# Patient Record
Sex: FEMALE | Race: WHITE | NOT HISPANIC OR LATINO | Employment: OTHER
[De-identification: names, ages, dates, MRNs, and addresses within clinical notes are randomized per-mention and may not be internally consistent; named-entity substitution may affect disease eponyms.]

---

## 2017-05-26 ENCOUNTER — HEALTH MAINTENANCE LETTER (OUTPATIENT)
Age: 47
End: 2017-05-26

## 2018-06-27 ENCOUNTER — OFFICE VISIT (OUTPATIENT)
Dept: FAMILY MEDICINE | Facility: CLINIC | Age: 48
End: 2018-06-27
Payer: COMMERCIAL

## 2018-06-27 VITALS
HEIGHT: 63 IN | WEIGHT: 154 LBS | HEART RATE: 68 BPM | TEMPERATURE: 98.6 F | DIASTOLIC BLOOD PRESSURE: 102 MMHG | BODY MASS INDEX: 27.29 KG/M2 | SYSTOLIC BLOOD PRESSURE: 158 MMHG

## 2018-06-27 DIAGNOSIS — Z12.31 VISIT FOR SCREENING MAMMOGRAM: ICD-10-CM

## 2018-06-27 DIAGNOSIS — Z00.00 ENCOUNTER FOR ROUTINE ADULT HEALTH EXAMINATION WITHOUT ABNORMAL FINDINGS: Primary | ICD-10-CM

## 2018-06-27 DIAGNOSIS — Z12.4 SCREENING FOR MALIGNANT NEOPLASM OF CERVIX: ICD-10-CM

## 2018-06-27 DIAGNOSIS — Q61.3 POLYCYSTIC KIDNEY: ICD-10-CM

## 2018-06-27 DIAGNOSIS — E03.9 ACQUIRED HYPOTHYROIDISM: ICD-10-CM

## 2018-06-27 DIAGNOSIS — N18.30 CKD (CHRONIC KIDNEY DISEASE) STAGE 3, GFR 30-59 ML/MIN (H): ICD-10-CM

## 2018-06-27 DIAGNOSIS — M54.30 SCIATICA, UNSPECIFIED LATERALITY: ICD-10-CM

## 2018-06-27 DIAGNOSIS — I10 HYPERTENSION GOAL BP (BLOOD PRESSURE) < 130/80: ICD-10-CM

## 2018-06-27 DIAGNOSIS — Z72.0 TOBACCO ABUSE: ICD-10-CM

## 2018-06-27 LAB
ALBUMIN UR-MCNC: 30 MG/DL
APPEARANCE UR: CLEAR
BACTERIA #/AREA URNS HPF: ABNORMAL /HPF
BILIRUB UR QL STRIP: NEGATIVE
COLOR UR AUTO: YELLOW
GLUCOSE UR STRIP-MCNC: NEGATIVE MG/DL
HGB BLD-MCNC: 14.4 G/DL (ref 11.7–15.7)
HGB UR QL STRIP: ABNORMAL
KETONES UR STRIP-MCNC: NEGATIVE MG/DL
LEUKOCYTE ESTERASE UR QL STRIP: NEGATIVE
NITRATE UR QL: NEGATIVE
NON-SQ EPI CELLS #/AREA URNS LPF: ABNORMAL /LPF
PH UR STRIP: 6.5 PH (ref 5–7)
RBC #/AREA URNS AUTO: ABNORMAL /HPF
SOURCE: ABNORMAL
SP GR UR STRIP: 1.01 (ref 1–1.03)
UROBILINOGEN UR STRIP-ACNC: 0.2 EU/DL (ref 0.2–1)
WBC #/AREA URNS AUTO: ABNORMAL /HPF

## 2018-06-27 PROCEDURE — 99396 PREV VISIT EST AGE 40-64: CPT | Performed by: PHYSICIAN ASSISTANT

## 2018-06-27 PROCEDURE — 85018 HEMOGLOBIN: CPT | Performed by: PHYSICIAN ASSISTANT

## 2018-06-27 PROCEDURE — 80053 COMPREHEN METABOLIC PANEL: CPT | Performed by: PHYSICIAN ASSISTANT

## 2018-06-27 PROCEDURE — 36415 COLL VENOUS BLD VENIPUNCTURE: CPT | Performed by: PHYSICIAN ASSISTANT

## 2018-06-27 PROCEDURE — 84443 ASSAY THYROID STIM HORMONE: CPT | Performed by: PHYSICIAN ASSISTANT

## 2018-06-27 PROCEDURE — 87624 HPV HI-RISK TYP POOLED RSLT: CPT | Performed by: PHYSICIAN ASSISTANT

## 2018-06-27 PROCEDURE — G0145 SCR C/V CYTO,THINLAYER,RESCR: HCPCS | Performed by: PHYSICIAN ASSISTANT

## 2018-06-27 PROCEDURE — 81001 URINALYSIS AUTO W/SCOPE: CPT | Performed by: PHYSICIAN ASSISTANT

## 2018-06-27 PROCEDURE — 99213 OFFICE O/P EST LOW 20 MIN: CPT | Mod: 25 | Performed by: PHYSICIAN ASSISTANT

## 2018-06-27 PROCEDURE — 82043 UR ALBUMIN QUANTITATIVE: CPT | Performed by: PHYSICIAN ASSISTANT

## 2018-06-27 RX ORDER — LISINOPRIL 10 MG/1
10 TABLET ORAL DAILY
Qty: 90 TABLET | Refills: 3 | Status: SHIPPED | OUTPATIENT
Start: 2018-06-27 | End: 2019-08-15

## 2018-06-27 RX ORDER — CYCLOBENZAPRINE HCL 10 MG
10 TABLET ORAL
Qty: 30 TABLET | Refills: 0 | Status: SHIPPED | OUTPATIENT
Start: 2018-06-27 | End: 2018-08-02

## 2018-06-27 NOTE — PATIENT INSTRUCTIONS
Iwona or her team will be in touch with you with results.  Return for fasting lab and BP check in 2 weeks.  Call [facility] to schedule appointment for imaging [phone].    It was so nice to see you!!!    Iwona Gao PA-C    Preventive Health Recommendations  Female Ages 40 to 49    Yearly exam:     See your health care provider every year in order to  1. Review health changes.   2. Discuss preventive care.    3. Review your medicines if your doctor prescribed any.      Get a Pap test every three years (unless you have an abnormal result and your provider advises testing more often).      If you get Pap tests with HPV test, you only need to test every 5 years, unless you have an abnormal result. You do not need a Pap test if your uterus was removed (hysterectomy) and you have not had cancer.      You should be tested each year for STDs (sexually transmitted diseases), if you're at risk.     Ask your doctor if you should have a mammogram.      Have a colonoscopy (test for colon cancer) if someone in your family has had colon cancer or polyps before age 50.       Have a cholesterol test every 5 years.       Have a diabetes test (fasting glucose) after age 45. If you are at risk for diabetes, you should have this test every 3 years.    Shots: Get a flu shot each year. Get a tetanus shot every 10 years.     Nutrition:     Eat at least 5 servings of fruits and vegetables each day.    Eat whole-grain bread, whole-wheat pasta and brown rice instead of white grains and rice.    Get adequate Calcium and Vitamin D.      Lifestyle    Exercise at least 150 minutes a week (an average of 30 minutes a day, 5 days a week). This will help you control your weight and prevent disease.    Limit alcohol to one drink per day.    No smoking.     Wear sunscreen to prevent skin cancer.    See your dentist every six months for an exam and cleaning.

## 2018-06-27 NOTE — PROGRESS NOTES
SUBJECTIVE:   CC: Shrei Joyce is an 48 year old woman who presents for preventive health visit.     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  Had quit smoking and drinking but has returned to this.  Cycles have been really regular.  No meds x 2 years as was without insurance.  Has had increase in kidney pain x couple of months     Hypertension Follow-up      Outpatient blood pressures are not being checked.    Low Salt Diet: not monitoring salt    Chronic Kidney Disease Follow-up      Current NSAID use?  No    Has polycystic kidneys and has had increase in pain for the past couple of months. Denies any dysuria, blood or abdominal pain. No fever or chills.      Today's PHQ-2 Score:   PHQ-2 ( 1999 Pfizer) 6/27/2018   Q1: Little interest or pleasure in doing things 0   Q2: Feeling down, depressed or hopeless 0   PHQ-2 Score 0   Q1: Little interest or pleasure in doing things Not at all   Q2: Feeling down, depressed or hopeless Not at all   PHQ-2 Score 0       Abuse: Current or Past(Physical, Sexual or Emotional)- No  Do you feel safe in your environment - Yes    Social History   Substance Use Topics     Smoking status: Current Every Day Smoker     Packs/day: 0.50     Years: 10.00     Types: Cigarettes     Smokeless tobacco: Never Used     Alcohol use No     Alcohol Use 6/27/2018   If you drink alcohol do you typically have greater than 3 drinks per day OR greater than 7 drinks per week? No   No  "flowsheet data found.    Reviewed orders with patient.  Reviewed health maintenance and updated orders accordingly - Yes    Mammo discussed, not appropriate for or declined by this patient.    Pertinent mammograms are reviewed under the imaging tab.  History of abnormal Pap smear: NO - age 30- 65 PAP every 3 years recommended  PAP / HPV 6/27/2018 3/21/2014 9/15/2010   PAP NIL NIL NIL     Reviewed and updated as needed this visit by clinical staff  Tobacco  Allergies  Meds  Med Hx  Surg Hx  Fam Hx  Soc Hx        Reviewed and updated as needed this visit by Provider            Review of Systems.  Constitutional, HEENT, cardiovascular, pulmonary, GI, , musculoskeletal, neuro, skin, endocrine and psych systems are negative, except as otherwise noted.     OBJECTIVE:   BP (!) 158/102 (Cuff Size: Adult Regular)  Pulse 68  Temp 98.6  F (37  C) (Oral)  Ht 5' 3\" (1.6 m)  Wt 154 lb (69.9 kg)  LMP 06/06/2018 (Exact Date)  BMI 27.28 kg/m2  Physical Exam  GENERAL: healthy, alert and no distress  EYES: Eyes grossly normal to inspection, PERRL and conjunctivae and sclerae normal  HENT: ear canals and TM's normal, nose and mouth without ulcers or lesions  NECK: no adenopathy, no asymmetry, masses, or scars and thyroid normal to palpation  RESP: lungs clear to auscultation - no rales, rhonchi or wheezes  BREAST: normal without masses, tenderness or nipple discharge and no palpable axillary masses or adenopathy  CV: regular rate and rhythm, normal S1 S2, no S3 or S4, no murmur, click or rub, no peripheral edema and peripheral pulses strong  ABDOMEN: soft, nontender, no hepatosplenomegaly, no masses and bowel sounds normal   (female): normal female external genitalia, normal urethral meatus, vaginal mucosa pink, moist, well rugated, and normal cervix/adnexa/uterus without masses or discharge  MS: no gross musculoskeletal defects noted, no edema  SKIN: no suspicious lesions or rashes  NEURO: Normal strength and tone, " "mentation intact and speech normal  PSYCH: mentation appears normal, affect normal/bright    Diagnostic Test Results:  pending    ASSESSMENT/PLAN:   1. Encounter for routine adult health examination without abnormal findings  Follow up pending above results. Encouraged healthy diet and regular exercise, monthy SBE, and yearly exams.    2. Screening for malignant neoplasm of cervix  - Pap imaged thin layer screen with HPV - recommended age 30 - 65 years (select HPV order below)  - HPV High Risk Types DNA Cervical    3. Hypertension goal BP (blood pressure) < 130/80  Not well controlled as has been out of medication x 2 years.  Is smoking but she is not ready to quit at this time.  Restart below medication  F/u in 2 weeks for BP check and labs.  - Lipid panel reflex to direct LDL Fasting; Future  - lisinopril (PRINIVIL/ZESTRIL) 10 MG tablet; Take 1 tablet (10 mg) by mouth daily  Dispense: 90 tablet; Refill: 3  - Comprehensive metabolic panel  - Basic metabolic panel; Future    4. CKD (chronic kidney disease) stage 3, GFR 30-59 ml/min  - Hemoglobin  - Albumin Random Urine Quantitative with Creat Ratio    5. Polycystic kidney  - UA with Microscopic reflex to Culture    6. Acquired hypothyroidism  - TSH WITH FREE T4 REFLEX    7. Sciatica, unspecified laterality  Refills of below medications for stable chronic conditions, rare use.  - cyclobenzaprine (FLEXERIL) 10 MG tablet; Take 1 tablet (10 mg) by mouth every evening as needed  Dispense: 30 tablet; Refill: 0    8. Tobacco abuse  Encouraged smoking cessation.    9. Visit for screening mammogram  - MA Screening Digital Bilateral; Future    COUNSELING:  Reviewed preventive health counseling, as reflected in patient instructions    BP Readings from Last 1 Encounters:   06/27/18 (!) 158/102     Estimated body mass index is 27.28 kg/(m^2) as calculated from the following:    Height as of this encounter: 5' 3\" (1.6 m).    Weight as of this encounter: 154 lb (69.9 " kg).      Weight management plan: Discussed healthy diet and exercise guidelines and patient will follow up in 3 months in clinic to re-evaluate.     reports that she has been smoking Cigarettes.  She has a 5.00 pack-year smoking history. She has never used smokeless tobacco.  Tobacco Cessation Action Plan: Information offered: Patient not interested at this time    Counseling Resources:  ATP IV Guidelines  Pooled Cohorts Equation Calculator  Breast Cancer Risk Calculator  FRAX Risk Assessment  ICSI Preventive Guidelines  Dietary Guidelines for Americans, 2010  USDA's MyPlate  ASA Prophylaxis  Lung CA Screening    Iwona Gao PA-C  [facility]  Answers for HPI/ROS submitted by the patient on 6/27/2018   PHQ-2 Score: 0

## 2018-06-27 NOTE — MR AVS SNAPSHOT
After Visit Summary   6/27/2018    Sheri Joyce    MRN: [id number]           Patient Information     Date Of Birth          1970        Visit Information        Provider Department      6/27/2018 10:20 AM Iwona Gao PA-C [facility]        Today's Diagnoses     Encounter for routine adult health examination without abnormal findings    -  1    Screening for malignant neoplasm of cervix        Hypertension goal BP (blood pressure) < 130/80        Sciatica, unspecified laterality        Visit for screening mammogram        Polycystic kidney        Acquired hypothyroidism        CKD (chronic kidney disease) stage 3, GFR 30-59 ml/min        Tobacco abuse          Care Instructions    Iwona or her team will be in touch with you with results.  Return for fasting lab and BP check in 2 weeks.  Call [facility] to schedule appointment for imaging [phone].    It was so nice to see you!!!    Iwona Gao PA-C    Preventive Health Recommendations  Female Ages 40 to 49    Yearly exam:     See your health care provider every year in order to  1. Review health changes.   2. Discuss preventive care.    3. Review your medicines if your doctor prescribed any.      Get a Pap test every three years (unless you have an abnormal result and your provider advises testing more often).      If you get Pap tests with HPV test, you only need to test every 5 years, unless you have an abnormal result. You do not need a Pap test if your uterus was removed (hysterectomy) and you have not had cancer.      You should be tested each year for STDs (sexually transmitted diseases), if you're at risk.     Ask your doctor if you should have a mammogram.      Have a colonoscopy (test for colon cancer) if someone in your family has had colon cancer or polyps before age 50.       Have a cholesterol test every 5 years.       Have a diabetes test (fasting glucose) after age 45. If  you are at risk for diabetes, you should have this test every 3 years.    Shots: Get a flu shot each year. Get a tetanus shot every 10 years.     Nutrition:     Eat at least 5 servings of fruits and vegetables each day.    Eat whole-grain bread, whole-wheat pasta and brown rice instead of white grains and rice.    Get adequate Calcium and Vitamin D.      Lifestyle    Exercise at least 150 minutes a week (an average of 30 minutes a day, 5 days a week). This will help you control your weight and prevent disease.    Limit alcohol to one drink per day.    No smoking.     Wear sunscreen to prevent skin cancer.    See your dentist every six months for an exam and cleaning.          Follow-ups after your visit        Future tests that were ordered for you today     Open Future Orders        Priority Expected Expires Ordered    Basic metabolic panel Routine 7/11/2018 7/27/2018 6/27/2018    Lipid panel reflex to direct LDL Fasting Routine 7/27/2018 6/27/2019 6/27/2018    MA Screening Digital Bilateral Routine  6/27/2019 6/27/2018            Who to contact     If you have questions or need follow up information about today's clinic visit or your schedule please contact [facility] directly at [phone].  Normal or non-critical lab and imaging results will be communicated to you by MyChart, letter or phone within 4 business days after the clinic has received the results. If you do not hear from us within 7 days, please contact the clinic through MyChart or phone. If you have a critical or abnormal lab result, we will notify you by phone as soon as possible.  Submit refill requests through [employer] or call your pharmacy and they will forward the refill request to us. Please allow 3 business days for your refill to be completed.          Additional Information About Your Visit        Care EveryWhere ID     This is your Care EveryWhere ID. This could be used by other organizations to access your [place]  "medical records  [id number]        Your Vitals Were     Pulse Temperature Height Last Period BMI (Body Mass Index)       68 98.6  F (37  C) (Oral) 5' 3\" (1.6 m) 06/06/2018 (Exact Date) 27.28 kg/m2        Blood Pressure from Last 3 Encounters:   06/27/18 (!) 158/102   02/12/16 138/86   10/23/15 142/80    Weight from Last 3 Encounters:   06/27/18 154 lb (69.9 kg)   02/12/16 145 lb 4 oz (65.9 kg)   10/23/15 152 lb (68.9 kg)              We Performed the Following     Albumin Random Urine Quantitative with Creat Ratio     Comprehensive metabolic panel     Hemoglobin     HPV High Risk Types DNA Cervical     Pap imaged thin layer screen with HPV - recommended age 30 - 65 years (select HPV order below)     TSH WITH FREE T4 REFLEX     UA with Microscopic reflex to Culture          Where to get your medicines      These medications were sent to [facility] [address] [place] [address] [address] AT [facility] & [address] , [facility] [address]     Phone:  [phone]     cyclobenzaprine 10 MG tablet    lisinopril 10 MG tablet          Primary Care Provider Office Phone # Fax #    Iwona Gao PA-C [phone]       [facility] [address]        Equal Access to Services     ROXANE GALO AH: Hadii fina baker hadasho Soomaali, waaxda luqadaha, qaybta kaalmada nirajyada, joseph mello. So [facility] [phone].    ATENCIÓN: Si habla español, tiene a vicente disposición servicios gratuitos de asistencia lingüística. Fredi al [phone].    We comply with applicable federal civil rights laws and [place] laws. We do not discriminate on the basis of race, color, national origin, age, disability, sex, sexual orientation, or gender identity.            Thank you!     Thank you for choosing [facility]  for your care. Our goal is always to provide you with excellent care. Hearing back from our patients is one way we can " continue to improve our services. Please take a few minutes to complete the written survey that you may receive in the mail after your visit with us. Thank you!             Your Updated Medication List - Protect others around you: Learn how to safely use, store and throw away your medicines at www.disposemymeds.org.          This list is accurate as of 6/27/18 11:04 AM.  Always use your most recent med list.                   Brand Name Dispense Instructions for use Diagnosis    cyclobenzaprine 10 MG tablet    FLEXERIL    30 tablet    Take 1 tablet (10 mg) by mouth every evening as needed    Sciatica, unspecified laterality       lisinopril 10 MG tablet    PRINIVIL/ZESTRIL    90 tablet    Take 1 tablet (10 mg) by mouth daily    Hypertension goal BP (blood pressure) < 130/80       LORazepam 0.5 MG tablet    ATIVAN    20 tablet    Take 1 tablet (0.5 mg) by mouth every 8 hours as needed for anxiety    Adjustment disorder with anxious mood       * varenicline 0.5 MG X 11 & 1 MG X 42 tablet    CHANTIX STARTING MONTH AP    53 tablet    Take 0.5 mg tab daily for 3 days, then 0.5 mg tab twice daily for 4 days, then 1 mg twice daily.    Tobacco use disorder       * varenicline 1 MG tablet    CHANTIX    56 tablet    Take 1 tablet (1 mg) by mouth 2 times daily    Tobacco use disorder       * Notice:  This list has 2 medication(s) that are the same as other medications prescribed for you. Read the directions carefully, and ask your doctor or other care provider to review them with you.

## 2018-06-28 LAB
ALBUMIN SERPL-MCNC: 3.7 G/DL (ref 3.4–5)
ALP SERPL-CCNC: 51 U/L (ref 40–150)
ALT SERPL W P-5'-P-CCNC: 16 U/L (ref 0–50)
ANION GAP SERPL CALCULATED.3IONS-SCNC: 9 MMOL/L (ref 3–14)
AST SERPL W P-5'-P-CCNC: 16 U/L (ref 0–45)
BILIRUB SERPL-MCNC: 0.6 MG/DL (ref 0.2–1.3)
BUN SERPL-MCNC: 14 MG/DL (ref 7–30)
CALCIUM SERPL-MCNC: 8.6 MG/DL (ref 8.5–10.1)
CHLORIDE SERPL-SCNC: 108 MMOL/L (ref 94–109)
CO2 SERPL-SCNC: 22 MMOL/L (ref 20–32)
CREAT SERPL-MCNC: 1.41 MG/DL (ref 0.52–1.04)
CREAT UR-MCNC: 18 MG/DL
GFR SERPL CREATININE-BSD FRML MDRD: 40 ML/MIN/1.7M2
GLUCOSE SERPL-MCNC: 67 MG/DL (ref 70–99)
MICROALBUMIN UR-MCNC: 147 MG/L
MICROALBUMIN/CREAT UR: 825.84 MG/G CR (ref 0–25)
POTASSIUM SERPL-SCNC: 3.6 MMOL/L (ref 3.4–5.3)
PROT SERPL-MCNC: 7.5 G/DL (ref 6.8–8.8)
SODIUM SERPL-SCNC: 139 MMOL/L (ref 133–144)
TSH SERPL DL<=0.005 MIU/L-ACNC: 1.35 MU/L (ref 0.4–4)

## 2018-06-29 LAB
COPATH REPORT: NORMAL
PAP: NORMAL

## 2018-07-03 LAB
FINAL DIAGNOSIS: NORMAL
HPV HR 12 DNA CVX QL NAA+PROBE: NEGATIVE
HPV16 DNA SPEC QL NAA+PROBE: NEGATIVE
HPV18 DNA SPEC QL NAA+PROBE: NEGATIVE
SPECIMEN DESCRIPTION: NORMAL
SPECIMEN SOURCE CVX/VAG CYTO: NORMAL

## 2018-07-30 DIAGNOSIS — M54.30 SCIATICA, UNSPECIFIED LATERALITY: ICD-10-CM

## 2018-07-30 NOTE — TELEPHONE ENCOUNTER
cyclobenzaprine (FLEXERIL) 10 MG tablet      Last Written Prescription Date:  6/27/2018  Last Fill Quantity: 30 tablet,   # refills: 0  Last Office Visit: 6/27/2018  YURI Ayers    Future Office visit:       Routing refill request to provider for review/approval because:  Drug not on the [facility], P or [facility] refill protocol or controlled substance

## 2018-07-31 NOTE — TELEPHONE ENCOUNTER
6/27 OV note says: Sciatica, unspecified laterality. Refills of below medications for stable chronic conditions, rare use.  Left a VM since that would be almost daily use.   Jailene Greenberg RN

## 2018-08-02 ENCOUNTER — MYC REFILL (OUTPATIENT)
Dept: FAMILY MEDICINE | Facility: CLINIC | Age: 48
End: 2018-08-02

## 2018-08-02 DIAGNOSIS — M54.30 SCIATICA, UNSPECIFIED LATERALITY: ICD-10-CM

## 2018-08-03 NOTE — TELEPHONE ENCOUNTER
Routing refill request to provider for review/approval because:  Drug not on the [facility] refill protocol     Souleymane Bhakta RN

## 2018-08-03 NOTE — TELEPHONE ENCOUNTER
Patient/family was instructed to return call to [facility] RN directly on the RN Call back line at [phone].     Souleymane Bhakta RN

## 2018-08-06 RX ORDER — CYCLOBENZAPRINE HCL 10 MG
10 TABLET ORAL
Qty: 30 TABLET | Refills: 3 | Status: SHIPPED | OUTPATIENT
Start: 2018-08-06 | End: 2019-08-15

## 2018-08-07 RX ORDER — CYCLOBENZAPRINE HCL 10 MG
TABLET ORAL
Qty: 30 TABLET | Refills: 0 | OUTPATIENT
Start: 2018-08-07

## 2018-10-23 ENCOUNTER — OFFICE VISIT (OUTPATIENT)
Dept: PEDIATRICS | Facility: CLINIC | Age: 48
End: 2018-10-23
Payer: COMMERCIAL

## 2018-10-23 VITALS
OXYGEN SATURATION: 98 % | HEIGHT: 63 IN | WEIGHT: 147.8 LBS | BODY MASS INDEX: 26.19 KG/M2 | HEART RATE: 88 BPM | DIASTOLIC BLOOD PRESSURE: 88 MMHG | TEMPERATURE: 99.1 F | SYSTOLIC BLOOD PRESSURE: 132 MMHG

## 2018-10-23 DIAGNOSIS — Z72.0 TOBACCO ABUSE: ICD-10-CM

## 2018-10-23 DIAGNOSIS — R35.0 URINE FREQUENCY: ICD-10-CM

## 2018-10-23 DIAGNOSIS — Q61.3 POLYCYSTIC KIDNEY: ICD-10-CM

## 2018-10-23 DIAGNOSIS — R07.0 THROAT PAIN: Primary | ICD-10-CM

## 2018-10-23 DIAGNOSIS — I10 HYPERTENSION GOAL BP (BLOOD PRESSURE) < 130/80: ICD-10-CM

## 2018-10-23 LAB
ALBUMIN SERPL-MCNC: 3.8 G/DL (ref 3.4–5)
ALBUMIN UR-MCNC: 10 MG/DL
ALP SERPL-CCNC: 52 U/L (ref 40–150)
ALT SERPL W P-5'-P-CCNC: 17 U/L (ref 0–50)
ANION GAP SERPL CALCULATED.3IONS-SCNC: 6 MMOL/L (ref 3–14)
APPEARANCE UR: CLEAR
AST SERPL W P-5'-P-CCNC: 19 U/L (ref 0–45)
BACTERIA #/AREA URNS HPF: ABNORMAL /HPF
BILIRUB SERPL-MCNC: 0.3 MG/DL (ref 0.2–1.3)
BILIRUB UR QL STRIP: NEGATIVE
BUN SERPL-MCNC: 19 MG/DL (ref 7–30)
CALCIUM SERPL-MCNC: 8.7 MG/DL (ref 8.5–10.1)
CHLORIDE SERPL-SCNC: 105 MMOL/L (ref 94–109)
CO2 SERPL-SCNC: 24 MMOL/L (ref 20–32)
COLOR UR AUTO: ABNORMAL
CREAT SERPL-MCNC: 1.67 MG/DL (ref 0.52–1.04)
DEPRECATED S PYO AG THROAT QL EIA: NORMAL
GFR SERPL CREATININE-BSD FRML MDRD: 33 ML/MIN/1.7M2
GLUCOSE SERPL-MCNC: 87 MG/DL (ref 70–99)
GLUCOSE UR STRIP-MCNC: NEGATIVE MG/DL
HGB UR QL STRIP: NEGATIVE
KETONES UR STRIP-MCNC: NEGATIVE MG/DL
LEUKOCYTE ESTERASE UR QL STRIP: NEGATIVE
NITRATE UR QL: NEGATIVE
NON-SQ EPI CELLS #/AREA URNS LPF: ABNORMAL /LPF
PH UR STRIP: 5.5 PH (ref 5–7)
POTASSIUM SERPL-SCNC: 4.1 MMOL/L (ref 3.4–5.3)
PROT SERPL-MCNC: 8.3 G/DL (ref 6.8–8.8)
RBC #/AREA URNS AUTO: ABNORMAL /HPF
SODIUM SERPL-SCNC: 135 MMOL/L (ref 133–144)
SOURCE: ABNORMAL
SP GR UR STRIP: 1.01 (ref 1–1.03)
SPECIMEN SOURCE: NORMAL
UROBILINOGEN UR STRIP-MCNC: NORMAL MG/DL (ref 0–2)
WBC #/AREA URNS AUTO: ABNORMAL /HPF

## 2018-10-23 PROCEDURE — 81001 URINALYSIS AUTO W/SCOPE: CPT | Performed by: INTERNAL MEDICINE

## 2018-10-23 PROCEDURE — 87081 CULTURE SCREEN ONLY: CPT | Performed by: INTERNAL MEDICINE

## 2018-10-23 PROCEDURE — 80053 COMPREHEN METABOLIC PANEL: CPT | Performed by: INTERNAL MEDICINE

## 2018-10-23 PROCEDURE — 87880 STREP A ASSAY W/OPTIC: CPT | Performed by: INTERNAL MEDICINE

## 2018-10-23 PROCEDURE — 99214 OFFICE O/P EST MOD 30 MIN: CPT | Performed by: INTERNAL MEDICINE

## 2018-10-23 PROCEDURE — 36415 COLL VENOUS BLD VENIPUNCTURE: CPT | Performed by: INTERNAL MEDICINE

## 2018-10-23 NOTE — MR AVS SNAPSHOT
After Visit Summary   10/23/2018    Sheri Joyce    MRN: [id number]           Patient Information     Date Of Birth          1970        Visit Information        Provider Department      10/23/2018 9:50 AM Prabhjot Montoya MD [facility]        Today's Diagnoses     Throat pain    -  1    Polycystic kidney        Hypertension goal BP (blood pressure) < 130/80        Tobacco abuse           Follow-ups after your visit        Follow-up notes from your care team     Return if symptoms worsen or fail to improve.      Who to contact     If you have questions or need follow up information about today's clinic visit or your schedule please contact [facility] directly at [phone].  Normal or non-critical lab and imaging results will be communicated to you by [employer], letter or phone within 4 business days after the clinic has received the results. If you do not hear from us within 7 days, please contact the clinic through Trufflshart or phone. If you have a critical or abnormal lab result, we will notify you by phone as soon as possible.  Submit refill requests through [employer] or call your pharmacy and they will forward the refill request to us. Please allow 3 business days for your refill to be completed.          Additional Information About Your Visit        MyChart Information     [employer] gives you secure access to your electronic health record. If you see a primary care provider, you can also send messages to your care team and make appointments. If you have questions, please call your primary care clinic.  If you do not have a primary care provider, please call [phone] and they will assist you.      [employer] is an electronic gateway that provides easy, online access to your medical records. With [employer], you can request a clinic appointment, read your test results, renew a prescription or communicate with your care team.     To access your existing  "account, please contact your [facility] or call [phone] for assistance.        Care EveryWhere ID     This is your Care EveryWhere ID. This could be used by other organizations to access your [place] medical records  [id number]        Your Vitals Were     Pulse Temperature Height Pulse Oximetry BMI (Body Mass Index)       88 99.1  F (37.3  C) (Temporal) 5' 3\" (1.6 m) 98% 26.18 kg/m2        Blood Pressure from Last 3 Encounters:   10/23/18 132/88   06/27/18 (!) 158/102   02/12/16 138/86    Weight from Last 3 Encounters:   10/23/18 147 lb 12.8 oz (67 kg)   06/27/18 154 lb (69.9 kg)   02/12/16 145 lb 4 oz (65.9 kg)              We Performed the Following     Beta strep group A culture     Comprehensive metabolic panel     Strep, Rapid Screen        Primary Care Provider Office Phone # Fax #    Iwona Daniela Gao PA-C [phone]       [address] [place] [id number]        Equal Access to Services     ANGELA GALO : Hadii aad ku hadasho Soomaali, waaxda luqadaha, qaybta kaalmada steph, joseph casey . So [facility] [phone].    ATENCIÓN: Si habla español, tiene a vicente disposición servicios gratuitos de asistencia lingüística. Fredi al [phone].    We comply with applicable federal civil rights laws and [place] laws. We do not discriminate on the basis of race, color, national origin, age, disability, sex, sexual orientation, or gender identity.            Thank you!     Thank you for choosing [facility]  for your care. Our goal is always to provide you with excellent care. Hearing back from our patients is one way we can continue to improve our services. Please take a few minutes to complete the written survey that you may receive in the mail after your visit with us. Thank you!             Your Updated Medication List - Protect others around you: Learn how to safely use, store and throw away your " medicines at www.disposemymeds.org.          This list is accurate as of 10/23/18 10:53 AM.  Always use your most recent med list.                   Brand Name Dispense Instructions for use Diagnosis    cyclobenzaprine 10 MG tablet    FLEXERIL    30 tablet    Take 1 tablet (10 mg) by mouth every evening as needed    Sciatica, unspecified laterality       lisinopril 10 MG tablet    PRINIVIL/ZESTRIL    90 tablet    Take 1 tablet (10 mg) by mouth daily    Hypertension goal BP (blood pressure) < 130/80

## 2018-10-23 NOTE — PROGRESS NOTES
SUBJECTIVE:   Sheri Joyce is a 48 year old female who presents to clinic today for the following health issues:      Acute Illness   Acute illness concerns: Fevers, chills and back pain  Onset: 10/22/18    Fever: YES- temps not taken    Chills/Sweats: YES- chills    Headache (location?): no     Sinus Pressure:no    Conjunctivitis:  No/itchy, allergies?    Ear Pain: no    Rhinorrhea: no     Congestion: no     Sore Throat: YES     Cough: YES-non-productive    Wheeze: no     Decreased Appetite: YES    Nausea: No    Vomiting: no     Diarrhea:  no     Dysuria/Freq.: no     Fatigue/Achiness: YES- both    Sick/Strep Exposure: YES- Son had strep last week     Therapies Tried and outcome: Nothing    HPI 48-year-old comes in complaining of a sore throat slight cough and a low-grade temperature the past few days.  Her son has strep throat infection and she wants to be checked out for strep.  She is a chronic smoker but denies having shortness of breath.  She also wants to have lab particularly liver and kidney test.  She has polycystic kidney disease and in June her creatinine was higher than average.  She plans to follow-up with her primary provider.  She also has hypertension and taking lisinopril 10 mg a day.  She gets side effects from lisinopril particularly cough.  Indicates she has been tried on numerous other antihypertensive medication but they have not worked for her.  She denies head congestion postnasal drip.      Problem list and histories reviewed & adjusted, as indicated.  Additional history: as documented    Patient Active Problem List   Diagnosis     Acquired hypothyroidism     Polycystic kidney     Family history of malignant neoplasm of breast     Contraception     CARDIOVASCULAR SCREENING; LDL GOAL LESS THAN 100     Frequent UTI     Hypertension goal BP (blood pressure) < 130/80     Tobacco abuse     CKD (chronic kidney disease) stage 3, GFR 30-59 ml/min (H)     Past Surgical History:   Procedure  "Laterality Date     LAPAROSCOPIC DECORTICATION CYST RENAL  2006     MYRINGOTOMY, INSERT TUBE(S), ADENOIDECTOMY, COMBINED       PE TUBES  age 13     SURGICAL HISTORY OF -   11/05    kidney surgery for cyst removal       Social History   Substance Use Topics     Smoking status: Current Every Day Smoker     Packs/day: 0.50     Years: 10.00     Types: Cigarettes     Smokeless tobacco: Never Used     Alcohol use No     Family History   Problem Relation Age of Onset     Cancer Mother      lung     Diabetes No family hx of      C.A.D. No family hx of      Hypertension No family hx of      Cerebrovascular Disease No family hx of      Breast Cancer No family hx of      Cancer - colorectal No family hx of      Prostate Cancer No family hx of          Current Outpatient Prescriptions   Medication Sig Dispense Refill     cyclobenzaprine (FLEXERIL) 10 MG tablet Take 1 tablet (10 mg) by mouth every evening as needed 30 tablet 3     lisinopril (PRINIVIL/ZESTRIL) 10 MG tablet Take 1 tablet (10 mg) by mouth daily 90 tablet 3     Allergies   Allergen Reactions     Bactrim [Sulfamethoxazole W/Trimethoprim] Itching     Pcn [Penicillin G]        Reviewed and updated as needed this visit by clinical staff  Tobacco  Allergies  Meds  Med Hx  Surg Hx  Fam Hx  Soc Hx      Reviewed and updated as needed this visit by Provider         ROS:  Constitutional, HEENT, cardiovascular, pulmonary, GI, , musculoskeletal, neuro, skin, endocrine and psych systems are negative, except as otherwise noted.    OBJECTIVE:     /88 (BP Location: Right arm, Patient Position: Sitting, Cuff Size: Adult Regular)  Pulse 88  Temp 99.1  F (37.3  C) (Temporal)  Ht 5' 3\" (1.6 m)  Wt 147 lb 12.8 oz (67 kg)  SpO2 98%  BMI 26.18 kg/m2  Body mass index is 26.18 kg/(m^2).  GENERAL: healthy, alert and no distress  HENT: ear canals and TM's normal, nose and mouth without ulcers or lesions  NECK: no adenopathy, no asymmetry, masses, or scars and thyroid " normal to palpation  RESP: lungs clear to auscultation - no rales, rhonchi or wheezes  CV: regular rate and rhythm, normal S1 S2, no S3 or S4, no murmur, click or rub, no peripheral edema and peripheral pulses strong    Diagnostic Test Results:  Results for orders placed or performed in visit on 10/23/18 (from the past 24 hour(s))   Strep, Rapid Screen   Result Value Ref Range    Specimen Description Throat     Rapid Strep A Screen       NEGATIVE: No Group A streptococcal antigen detected by immunoassay, await culture report.       ASSESSMENT/PLAN:     1.  Viral upper respiratory infection.  Symptomatic treatment recommended.  2.  Polycystic kidney disease with renal insufficiency.  Per her request I will check CMP and get back to her with the results.  She will follow-up with her PCP.  3.  Essential hypertension on lisinopril 10 mg a day.  She has side effect of cough.  I suggest she discuss with her PCP and consider trying ARB if they have not been tried in the past.  4.  Chronic tobacco use.  Patient has tried Chantix in the past without success.    Prabhjot Montoya MD  [facility]

## 2018-10-23 NOTE — LETTER
October 24, 2018      Sheri Cohen Larsf  [address]  [place] [facility] [address]        Dear ,    We are writing to inform you of your test results.    The urine analysis is normal. No evidence of infection.  The kidney function has gotten worst with creatinine rising to 1.67 from previously measured 1.4. Please follow-up with your primary provider.    Resulted Orders   Strep, Rapid Screen   Result Value Ref Range    Specimen Description Throat     Rapid Strep A Screen       NEGATIVE: No Group A streptococcal antigen detected by immunoassay, await culture report.   Comprehensive metabolic panel   Result Value Ref Range    Sodium 135 133 - 144 mmol/L    Potassium 4.1 3.4 - 5.3 mmol/L    Chloride 105 94 - 109 mmol/L    Carbon Dioxide 24 20 - 32 mmol/L    Anion Gap 6 3 - 14 mmol/L    Glucose 87 70 - 99 mg/dL      Comment:      Non Fasting    Urea Nitrogen 19 7 - 30 mg/dL    Creatinine 1.67 (H) 0.52 - 1.04 mg/dL    GFR Estimate 33 (L) >60 mL/min/1.7m2      Comment:      Non  GFR Calc    GFR Estimate If Black 39 (L) >60 mL/min/1.7m2      Comment:       GFR Calc    Calcium 8.7 8.5 - 10.1 mg/dL    Bilirubin Total 0.3 0.2 - 1.3 mg/dL    Albumin 3.8 3.4 - 5.0 g/dL    Protein Total 8.3 6.8 - 8.8 g/dL    Alkaline Phosphatase 52 40 - 150 U/L    ALT 17 0 - 50 U/L    AST 19 0 - 45 U/L   UA with Microscopic reflex to Culture ([place]; [facility])   Result Value Ref Range    Color Urine Light Yellow     Appearance Urine Clear     Glucose Urine Negative NEG^Negative mg/dL    Bilirubin Urine Negative NEG^Negative    Ketones Urine Negative NEG^Negative mg/dL    Specific Gravity Urine 1.006 1.003 - 1.035    Blood Urine Negative NEG^Negative    pH Urine 5.5 5.0 - 7.0 pH    Protein Albumin Urine 10 (A) NEG^Negative mg/dL    Urobilinogen mg/dL Normal 0.0 - 2.0 mg/dL    Nitrite Urine Negative NEG^Negative    Leukocyte Esterase Urine Negative NEG^Negative    Source Midstream  Urine     WBC Urine 0 - 5 OTO5^0 - 5 /HPF    RBC Urine O - 2 OTO2^O - 2 /HPF    Bacteria Urine Few (A) NEG^Negative /HPF    Squamous Epithelial /LPF Urine Few FEW^Few /LPF       If you have any questions or concerns, please call the clinic at the number listed above.       Sincerely,        Prabhjot Montoya MD

## 2018-10-24 ENCOUNTER — MYC MEDICAL ADVICE (OUTPATIENT)
Dept: PEDIATRICS | Facility: CLINIC | Age: 48
End: 2018-10-24

## 2018-10-24 LAB
BACTERIA SPEC CULT: NORMAL
SPECIMEN SOURCE: NORMAL

## 2018-10-24 NOTE — TELEPHONE ENCOUNTER
[employer] message sent to patient.    Bonny Bustillo RN,   [facility], located [facility]

## 2019-08-15 ENCOUNTER — OFFICE VISIT (OUTPATIENT)
Dept: PEDIATRICS | Facility: CLINIC | Age: 49
End: 2019-08-15
Payer: COMMERCIAL

## 2019-08-15 ENCOUNTER — ANCILLARY PROCEDURE (OUTPATIENT)
Dept: MAMMOGRAPHY | Facility: CLINIC | Age: 49
End: 2019-08-15
Attending: NURSE PRACTITIONER
Payer: COMMERCIAL

## 2019-08-15 VITALS
HEIGHT: 63 IN | DIASTOLIC BLOOD PRESSURE: 80 MMHG | SYSTOLIC BLOOD PRESSURE: 141 MMHG | HEART RATE: 70 BPM | TEMPERATURE: 97.9 F | WEIGHT: 156.8 LBS | BODY MASS INDEX: 27.78 KG/M2 | OXYGEN SATURATION: 98 %

## 2019-08-15 DIAGNOSIS — Z00.00 ROUTINE GENERAL MEDICAL EXAMINATION AT A HEALTH CARE FACILITY: ICD-10-CM

## 2019-08-15 DIAGNOSIS — Z12.39 ENCOUNTER FOR SCREENING BREAST EXAMINATION: ICD-10-CM

## 2019-08-15 DIAGNOSIS — Z12.11 SCREEN FOR COLON CANCER: ICD-10-CM

## 2019-08-15 DIAGNOSIS — F17.210 CIGARETTE NICOTINE DEPENDENCE WITHOUT COMPLICATION: ICD-10-CM

## 2019-08-15 DIAGNOSIS — Z13.29 SCREENING FOR THYROID DISORDER: ICD-10-CM

## 2019-08-15 DIAGNOSIS — Z13.1 SCREENING FOR DIABETES MELLITUS (DM): ICD-10-CM

## 2019-08-15 DIAGNOSIS — Z13.6 CARDIOVASCULAR SCREENING; LDL GOAL LESS THAN 130: ICD-10-CM

## 2019-08-15 DIAGNOSIS — Z00.00 ROUTINE GENERAL MEDICAL EXAMINATION AT A HEALTH CARE FACILITY: Primary | ICD-10-CM

## 2019-08-15 DIAGNOSIS — M54.30 SCIATICA, UNSPECIFIED LATERALITY: ICD-10-CM

## 2019-08-15 DIAGNOSIS — Q61.3 POLYCYSTIC KIDNEY, UNSPECIFIED TYPE: ICD-10-CM

## 2019-08-15 DIAGNOSIS — N18.30 CKD (CHRONIC KIDNEY DISEASE) STAGE 3, GFR 30-59 ML/MIN (H): ICD-10-CM

## 2019-08-15 DIAGNOSIS — I10 HYPERTENSION GOAL BP (BLOOD PRESSURE) < 130/80: ICD-10-CM

## 2019-08-15 DIAGNOSIS — Z13.0 SCREENING FOR DISORDER OF BLOOD AND BLOOD-FORMING ORGANS: ICD-10-CM

## 2019-08-15 LAB
ALBUMIN SERPL-MCNC: 3.5 G/DL (ref 3.4–5)
ALP SERPL-CCNC: 57 U/L (ref 40–150)
ALT SERPL W P-5'-P-CCNC: 16 U/L (ref 0–50)
ANION GAP SERPL CALCULATED.3IONS-SCNC: 6 MMOL/L (ref 3–14)
AST SERPL W P-5'-P-CCNC: 12 U/L (ref 0–45)
BILIRUB SERPL-MCNC: 0.2 MG/DL (ref 0.2–1.3)
BUN SERPL-MCNC: 26 MG/DL (ref 7–30)
CALCIUM SERPL-MCNC: 8.5 MG/DL (ref 8.5–10.1)
CHLORIDE SERPL-SCNC: 110 MMOL/L (ref 94–109)
CHOLEST SERPL-MCNC: 186 MG/DL
CO2 SERPL-SCNC: 24 MMOL/L (ref 20–32)
CREAT SERPL-MCNC: 1.7 MG/DL (ref 0.52–1.04)
CREAT UR-MCNC: 59 MG/DL
ERYTHROCYTE [DISTWIDTH] IN BLOOD BY AUTOMATED COUNT: 13.1 % (ref 10–15)
GFR SERPL CREATININE-BSD FRML MDRD: 35 ML/MIN/{1.73_M2}
GLUCOSE SERPL-MCNC: 98 MG/DL (ref 70–99)
HCT VFR BLD AUTO: 45.6 % (ref 35–47)
HDLC SERPL-MCNC: 66 MG/DL
HGB BLD-MCNC: 14.9 G/DL (ref 11.7–15.7)
LDLC SERPL CALC-MCNC: 101 MG/DL
MCH RBC QN AUTO: 30.4 PG (ref 26.5–33)
MCHC RBC AUTO-ENTMCNC: 32.7 G/DL (ref 31.5–36.5)
MCV RBC AUTO: 93 FL (ref 78–100)
MICROALBUMIN UR-MCNC: 760 MG/L
MICROALBUMIN/CREAT UR: 1281.62 MG/G CR (ref 0–25)
NONHDLC SERPL-MCNC: 120 MG/DL
PLATELET # BLD AUTO: 224 10E9/L (ref 150–450)
POTASSIUM SERPL-SCNC: 4.4 MMOL/L (ref 3.4–5.3)
PROT SERPL-MCNC: 7.7 G/DL (ref 6.8–8.8)
RBC # BLD AUTO: 4.9 10E12/L (ref 3.8–5.2)
SODIUM SERPL-SCNC: 140 MMOL/L (ref 133–144)
TRIGL SERPL-MCNC: 97 MG/DL
TSH SERPL DL<=0.005 MIU/L-ACNC: 2.61 MU/L (ref 0.4–4)
WBC # BLD AUTO: 9.5 10E9/L (ref 4–11)

## 2019-08-15 PROCEDURE — 36415 COLL VENOUS BLD VENIPUNCTURE: CPT | Performed by: NURSE PRACTITIONER

## 2019-08-15 PROCEDURE — 82043 UR ALBUMIN QUANTITATIVE: CPT | Performed by: NURSE PRACTITIONER

## 2019-08-15 PROCEDURE — 99213 OFFICE O/P EST LOW 20 MIN: CPT | Mod: 25 | Performed by: NURSE PRACTITIONER

## 2019-08-15 PROCEDURE — 80061 LIPID PANEL: CPT | Performed by: NURSE PRACTITIONER

## 2019-08-15 PROCEDURE — 85027 COMPLETE CBC AUTOMATED: CPT | Performed by: NURSE PRACTITIONER

## 2019-08-15 PROCEDURE — 84443 ASSAY THYROID STIM HORMONE: CPT | Performed by: NURSE PRACTITIONER

## 2019-08-15 PROCEDURE — 99396 PREV VISIT EST AGE 40-64: CPT | Performed by: NURSE PRACTITIONER

## 2019-08-15 PROCEDURE — 77067 SCR MAMMO BI INCL CAD: CPT | Performed by: RADIOLOGY

## 2019-08-15 PROCEDURE — 80053 COMPREHEN METABOLIC PANEL: CPT | Performed by: NURSE PRACTITIONER

## 2019-08-15 RX ORDER — LISINOPRIL 10 MG/1
10 TABLET ORAL DAILY
Qty: 90 TABLET | Refills: 3 | Status: SHIPPED | OUTPATIENT
Start: 2019-08-15 | End: 2020-04-23

## 2019-08-15 RX ORDER — CYCLOBENZAPRINE HCL 10 MG
10 TABLET ORAL
Qty: 30 TABLET | Refills: 3 | Status: SHIPPED | OUTPATIENT
Start: 2019-08-15 | End: 2024-02-27

## 2019-08-15 RX ORDER — LISINOPRIL 10 MG/1
10 TABLET ORAL DAILY
Qty: 90 TABLET | Refills: 3 | Status: CANCELLED | OUTPATIENT
Start: 2019-08-15

## 2019-08-15 RX ORDER — VARENICLINE TARTRATE 1 MG/1
1 TABLET, FILM COATED ORAL 2 TIMES DAILY
Qty: 60 TABLET | Refills: 2 | Status: SHIPPED | OUTPATIENT
Start: 2019-09-15 | End: 2022-10-12

## 2019-08-15 ASSESSMENT — MIFFLIN-ST. JEOR: SCORE: 1304.33

## 2019-08-15 NOTE — NURSING NOTE
"Chief Complaint   Patient presents with     Physical     AFE-fasting today       Initial BP (!) 141/80 (BP Location: Right arm, Patient Position: Sitting, Cuff Size: Adult Regular)   Pulse 70   Temp 97.9  F (36.6  C) (Temporal)   Ht 1.607 m (5' 3.25\")   Wt 71.1 kg (156 lb 12.8 oz)   LMP 08/09/2019   SpO2 98%   Breastfeeding? No   BMI 27.56 kg/m   Estimated body mass index is 27.56 kg/m  as calculated from the following:    Height as of this encounter: 1.607 m (5' 3.25\").    Weight as of this encounter: 71.1 kg (156 lb 12.8 oz).  Medication Reconciliation: complete      JOSE Sullivan      "

## 2019-08-15 NOTE — PROGRESS NOTES
SUBJECTIVE:   CC: Sheri Joyce is an 49 year old woman who presents for preventive health visit.     Healthy Habits:    Do you get at least three servings of calcium containing foods daily (dairy, green leafy vegetables, etc.)? yes    Amount of exercise or daily activities, outside of work: walking daily    Problems taking medications regularly Yes did not have insurance for 1.5 years and unable to take medication    Medication side effects: No    Have you had an eye exam in the past two years? yes    Do you see a dentist twice per year? yes    Do you have sleep apnea, excessive snoring or daytime drowsiness?no    Hypertension Follow-up      Do you check your blood pressure regularly outside of the clinic? No     Are you following a low salt diet? Yes    Are your blood pressures ever more than 140 on the top number (systolic) OR more   than 90 on the bottom number (diastolic), for example 140/90? Yes-being out of medication    Today's PHQ-2 Score:   PHQ-2 ( 1999 [employer]) 8/15/2019 6/27/2018   Q1: Little interest or pleasure in doing things 0 0   Q2: Feeling down, depressed or hopeless 0 0   PHQ-2 Score 0 0   Q1: Little interest or pleasure in doing things - Not at all   Q2: Feeling down, depressed or hopeless - Not at all   PHQ-2 Score - 0       Abuse: Current or Past(Physical, Sexual or Emotional)- No  Do you feel safe in your environment? Yes    Social History     Tobacco Use     Smoking status: Current Every Day Smoker     Packs/day: 0.50     Years: 10.00     Pack years: 5.00     Types: Cigarettes     Smokeless tobacco: Never Used   Substance Use Topics     Alcohol use: No     If you drink alcohol do you typically have >3 drinks per day or >7 drinks per week? Yes - AUDIT SCORE:     No flowsheet data found.                     Reviewed orders with patient.  Reviewed health maintenance and updated orders accordingly - Yes  Labs reviewed in EPIC  BP Readings from Last 3 Encounters:   08/15/19 (!) 141/80    10/23/18 132/88   06/27/18 (!) 158/102    Wt Readings from Last 3 Encounters:   08/15/19 71.1 kg (156 lb 12.8 oz)   10/23/18 67 kg (147 lb 12.8 oz)   06/27/18 69.9 kg (154 lb)                  Patient Active Problem List   Diagnosis     Acquired hypothyroidism     Polycystic kidney     Family history of malignant neoplasm of breast     Contraception     CARDIOVASCULAR SCREENING; LDL GOAL LESS THAN 100     Frequent UTI     Hypertension goal BP (blood pressure) < 130/80     Tobacco abuse     CKD (chronic kidney disease) stage 3, GFR 30-59 ml/min (H)     Past Surgical History:   Procedure Laterality Date     LAPAROSCOPIC DECORTICATION CYST RENAL  2006     MYRINGOTOMY, INSERT TUBE(S), ADENOIDECTOMY, COMBINED       PE TUBES  age 13     SURGICAL HISTORY OF -   11/05    kidney surgery for cyst removal       Social History     Tobacco Use     Smoking status: Current Every Day Smoker     Packs/day: 0.50     Years: 10.00     Pack years: 5.00     Types: Cigarettes     Smokeless tobacco: Never Used   Substance Use Topics     Alcohol use: No     Family History   Problem Relation Age of Onset     Cancer Mother         lung     Diabetes No family hx of      C.A.D. No family hx of      Hypertension No family hx of      Cerebrovascular Disease No family hx of      Breast Cancer No family hx of      Cancer - colorectal No family hx of      Prostate Cancer No family hx of          Current Outpatient Medications   Medication Sig Dispense Refill     cyclobenzaprine (FLEXERIL) 10 MG tablet Take 1 tablet (10 mg) by mouth every evening as needed 30 tablet 3     lisinopril (PRINIVIL/ZESTRIL) 10 MG tablet Take 1 tablet (10 mg) by mouth daily 90 tablet 3     Allergies   Allergen Reactions     Bactrim [Sulfamethoxazole W/Trimethoprim] Itching     Pcn [Penicillin G]        Mammogram Screening: Patient over age 50, mutual decision to screen reflected in health maintenance.    Pertinent mammograms are reviewed under the imaging tab.  History of  abnormal Pap smear: NO - age 30- 65 PAP every 3 years recommended  PAP / HPV Latest Ref Rng & Units 6/27/2018 3/21/2014 9/15/2010   PAP - NIL NIL NIL   HPV 16 DNA NEG:Negative Negative - -   HPV 18 DNA NEG:Negative Negative - -   OTHER HR HPV NEG:Negative Negative - -     Reviewed and updated as needed this visit by clinical staff  Tobacco  Allergies  Meds  Med Hx  Surg Hx  Fam Hx  Soc Hx        Reviewed and updated as needed this visit by Provider        Past Medical History:   Diagnosis Date     Chronic kidney disease      Contraception 3/19/2009     Hypertension      Liver disease      Polycystic kidney, unspecified type      Thyroid disease      Varicosities       Past Surgical History:   Procedure Laterality Date     LAPAROSCOPIC DECORTICATION CYST RENAL  2006     MYRINGOTOMY, INSERT TUBE(S), ADENOIDECTOMY, COMBINED       PE TUBES  age 13     SURGICAL HISTORY OF -   11/05    kidney surgery for cyst removal       ROS:  CONSTITUTIONAL:NEGATIVE for fever, chills, change in weight  INTEGUMENTARY/SKIN: NEGATIVE for worrisome rashes, moles or lesions  EYES: NEGATIVE for vision changes or irritation  ENT: NEGATIVE for ear, mouth and throat problems  RESP:NEGATIVE for significant cough or SOB  BREAST: POSITIVE for itchiness on her right breast  and NEGATIVE for erythema, galactorrhea, skin changes , swelling and warmth  CV: NEGATIVE for chest pain, palpitations or peripheral edema Has kidney disease but has not seen nephrology in several years. Has been off her BP medications for about a year   GI: NEGATIVE for nausea, abdominal pain, heartburn, or change in bowel habits   female: normal menses, no unusual urinary symptoms and no unusual vaginal symptoms  MUSCULOSKELETAL:NEGATIVE for significant arthralgias or myalgia  NEURO: NEGATIVE for weakness, dizziness or paresthesias  ENDOCRINE: NEGATIVE for temperature intolerance, skin/hair changes  HEME/ALLERGY/IMMUNE: POSITIVE  for allergies and NEGATIVE for bleeding  "disorder  PSYCHIATRIC: POSITIVE forHx anxiety and not on any medication.  and NEGATIVE forthoughts of hurting someone else and thoughts of self harm   States is self medicating due to recent loss of friend and life partner. No thoughts of self harm. About a couple drinks at night. States is drinking beer like crazy lately       OBJECTIVE:   BP (!) 141/80 (BP Location: Right arm, Patient Position: Sitting, Cuff Size: Adult Regular)   Pulse 70   Temp 97.9  F (36.6  C) (Temporal)   Ht 1.607 m (5' 3.25\")   Wt 71.1 kg (156 lb 12.8 oz)   LMP 08/09/2019   SpO2 98%   Breastfeeding? No   BMI 27.56 kg/m     Wt Readings from Last 4 Encounters:   08/15/19 71.1 kg (156 lb 12.8 oz)   10/23/18 67 kg (147 lb 12.8 oz)   06/27/18 69.9 kg (154 lb)   02/12/16 65.9 kg (145 lb 4 oz)       EXAM:  GENERAL: healthy, alert and no distress  EYES: Eyes grossly normal to inspection and conjunctivae and sclerae normal  HENT: ear canals and TM's normal, nose and mouth without ulcers or lesions  NECK: no adenopathy, no asymmetry, masses, or scars and thyroid normal to palpation  RESP: lungs clear to auscultation - no rales, rhonchi or wheezes  BREAST: normal without masses, tenderness or nipple discharge and no palpable axillary masses or adenopathy  CV: regular rates and rhythm, no murmur, click or rub, peripheral pulses strong and no peripheral edema  ABDOMEN: mild tenderness in the entire abdomen area, no guarding or rigidity, no herniae noted, no masses noted   (female): normal female external genitalia, normal urethral meatus, vaginal mucosa pink, moist, well rugated, and normal cervix/adnexa/uterus without masses or discharge  MS: no gross musculoskeletal defects noted, no edema  SKIN: no suspicious lesions or rashes  NEURO: Normal strength and tone, mentation intact and speech normal  PSYCH: mentation appears normal, affect normal/bright  LYMPH: no cervical, supraclavicular, axillary, or inguinal adenopathy    Diagnostic Test " Results:  Results for orders placed or performed in visit on 08/15/19   Lipid panel reflex to direct LDL Fasting   Result Value Ref Range    Cholesterol 186 <200 mg/dL    Triglycerides 97 <150 mg/dL    HDL Cholesterol 66 >49 mg/dL    LDL Cholesterol Calculated 101 (H) <100 mg/dL    Non HDL Cholesterol 120 <130 mg/dL   TSH with free T4 reflex   Result Value Ref Range    TSH 2.61 0.40 - 4.00 mU/L   Albumin Random Urine Quantitative with Creat Ratio   Result Value Ref Range    Creatinine Urine 59 mg/dL    Albumin Urine mg/L 760 mg/L    Albumin Urine mg/g Cr 1,281.62 (H) 0 - 25 mg/g Cr   Comprehensive metabolic panel   Result Value Ref Range    Sodium 140 133 - 144 mmol/L    Potassium 4.4 3.4 - 5.3 mmol/L    Chloride 110 (H) 94 - 109 mmol/L    Carbon Dioxide 24 20 - 32 mmol/L    Anion Gap 6 3 - 14 mmol/L    Glucose 98 70 - 99 mg/dL    Urea Nitrogen 26 7 - 30 mg/dL    Creatinine 1.70 (H) 0.52 - 1.04 mg/dL    GFR Estimate 35 (L) >60 mL/min/[1.73_m2]    GFR Estimate If Black 40 (L) >60 mL/min/[1.73_m2]    Calcium 8.5 8.5 - 10.1 mg/dL    Bilirubin Total 0.2 0.2 - 1.3 mg/dL    Albumin 3.5 3.4 - 5.0 g/dL    Protein Total 7.7 6.8 - 8.8 g/dL    Alkaline Phosphatase 57 40 - 150 U/L    ALT 16 0 - 50 U/L    AST 12 0 - 45 U/L   CBC with platelets   Result Value Ref Range    WBC 9.5 4.0 - 11.0 10e9/L    RBC Count 4.90 3.8 - 5.2 10e12/L    Hemoglobin 14.9 11.7 - 15.7 g/dL    Hematocrit 45.6 35.0 - 47.0 %    MCV 93 78 - 100 fl    MCH 30.4 26.5 - 33.0 pg    MCHC 32.7 31.5 - 36.5 g/dL    RDW 13.1 10.0 - 15.0 %    Platelet Count 224 150 - 450 10e9/L       ASSESSMENT/PLAN:   Sheri was seen today for physical.    Diagnoses and all orders for this visit:    Routine general medical examination at a health care facility  -     Lipid panel reflex to direct LDL Fasting  -     JUST IN CASE  -     TSH with free T4 reflex  -     Albumin Random Urine Quantitative with Creat Ratio  -     Comprehensive metabolic panel  -     *MA Screening Digital  Bilateral; Future  -     CBC with platelets    Hypertension goal BP (blood pressure) < 130/80  -     Albumin Random Urine Quantitative with Creat Ratio  -     Comprehensive metabolic panel  -     lisinopril (PRINIVIL/ZESTRIL) 10 MG tablet; Take 1 tablet (10 mg) by mouth daily  HTN Plan:  1)  Medication: begin: lisinopril   2)  Dietary sodium restriction  3)  Regular aerobic exercise  Need to make appointment with nephrology   Patient Education: Reviewed risks of hypertension and principles of   treatment.      Sciatica, unspecified laterality  -     cyclobenzaprine (FLEXERIL) 10 MG tablet; Take 1 tablet (10 mg) by mouth every evening as needed  Continue current medication regimen unchanged.    CARDIOVASCULAR SCREENING; LDL GOAL LESS THAN 130  -     Lipid panel reflex to direct LDL Fasting    Screening for diabetes mellitus (DM)  -     JUST IN CASE  -     Comprehensive metabolic panel    Screening for thyroid disorder  -     TSH with free T4 reflex    Encounter for screening breast examination  -     *MA Screening Digital Bilateral; Future    Screen for colon cancer  -     GASTROENTEROLOGY ADULT REF PROCEDURE ONLY    Polycystic kidney, unspecified type  -     CBC with platelets    Screening for disorder of blood and blood-forming organs  -     CBC with platelets    CKD (chronic kidney disease) stage 3, GFR 30-59 ml/min (H)  -     NEPHROLOGY ADULT REFERRAL    Cigarette nicotine dependence without complication  -     varenicline (CHANTIX AP) 0.5 MG X 11 & 1 MG X 42 tablet; Take 0.5 mg tab daily for 3 days, THEN 0.5 mg tab twice daily for 4 days, THEN 1 mg twice daily.  -     varenicline (CHANTIX) 1 MG tablet; Take 1 tablet (1 mg) by mouth 2 times daily    PLAN:   Patient needs to follow up in if no improvement,or sooner if worsening of symptoms or other symptoms develop.  FURTHER TESTING:       - mammogram  CONSULTATION/REFERRAL to nephrology and colonoscopy   Will follow up and/or notify patient of  results via My  "Chart to determine further need for followup  Follow up office visit in one year for annual health maintenance exam, sooner PRN.      COUNSELING:   Reviewed preventive health counseling, as reflected in patient instructions  Special attention given to:        Regular exercise       Healthy diet/nutrition       Osteoporosis Prevention/Bone Health       Consider Hep C screening for patients born between 1945 and 1965       HIV screeninx in teen years, 1x in adult years, and at intervals if high risk       The 10-year ASCVD risk score (Omero SOLORZANO Jr., et al., 2013) is: 4.2%    Values used to calculate the score:      Age: 49 years      Sex: Female      Is Non- : No      Diabetic: No      Tobacco smoker: Yes      Systolic Blood Pressure: 141 mmHg      Is BP treated: Yes      HDL Cholesterol: 66 mg/dL      Total Cholesterol: 186 mg/dL    Estimated body mass index is 27.56 kg/m  as calculated from the following:    Height as of this encounter: 1.607 m (5' 3.25\").    Weight as of this encounter: 71.1 kg (156 lb 12.8 oz).    Weight management plan: Discussed healthy diet and exercise guidelines     reports that she has been smoking cigarettes.  She has a 5.00 pack-year smoking history. She has never used smokeless tobacco.  Tobacco Cessation Action Plan: Pharmacotherapies : Chantix    Counseling Resources:  ATP IV Guidelines  Pooled Cohorts Equation Calculator  Breast Cancer Risk Calculator  FRAX Risk Assessment  ICSI Preventive Guidelines  Dietary Guidelines for Americans, 2010  USDA's MyPlate  ASA Prophylaxis  Lung CA Screening    Tianna Vieyra, RYDER CNP  [facility]  "

## 2019-08-15 NOTE — PATIENT INSTRUCTIONS
PLAN:   1.   Symptomatic therapy suggested: restart lisinopril.  2.  Orders Placed This Encounter   Medications     cyclobenzaprine (FLEXERIL) 10 MG tablet     Sig: Take 1 tablet (10 mg) by mouth every evening as needed     Dispense:  30 tablet     Refill:  3     varenicline (CHANTIX AP) 0.5 MG X 11 & 1 MG X 42 tablet     Sig: Take 0.5 mg tab daily for 3 days, THEN 0.5 mg tab twice daily for 4 days, THEN 1 mg twice daily.     Dispense:  53 tablet     Refill:  0     varenicline (CHANTIX) 1 MG tablet     Sig: Take 1 tablet (1 mg) by mouth 2 times daily     Dispense:  60 tablet     Refill:  2     lisinopril (PRINIVIL/ZESTRIL) 10 MG tablet     Sig: Take 1 tablet (10 mg) by mouth daily     Dispense:  90 tablet     Refill:  3     Orders Placed This Encounter   Procedures     *MA Screening Digital Bilateral     Lipid panel reflex to direct LDL Fasting     JUST IN CASE     TSH with free T4 reflex     Albumin Random Urine Quantitative with Creat Ratio     Comprehensive metabolic panel     CBC with platelets     GASTROENTEROLOGY ADULT REF PROCEDURE ONLY     NEPHROLOGY ADULT REFERRAL       3. Patient needs to follow up in if no improvement,or sooner if worsening of symptoms or other symptoms develop.  FURTHER TESTING:       - mammogram  CONSULTATION/REFERRAL to nephrology and colonoscopy   Will follow up and/or notify patient of  results via My Chart to determine further need for followup  Follow up office visit in one year for annual health maintenance exam, sooner PRN.    Preventive Health Recommendations  Female Ages 50 - 64    Yearly exam: See your health care provider every year in order to  o Review health changes.   o Discuss preventive care.    o Review your medicines if your doctor has prescribed any.      Get a Pap test every three years (unless you have an abnormal result and your provider advises testing more often).    If you get Pap tests with HPV test, you only need to test every 5 years, unless you have an  abnormal result.     You do not need a Pap test if your uterus was removed (hysterectomy) and you have not had cancer.    You should be tested each year for STDs (sexually transmitted diseases) if you're at risk.     Have a mammogram every 1 to 2 years.    Have a colonoscopy at age 50, or have a yearly FIT test (stool test). These exams screen for colon cancer.      Have a cholesterol test every 5 years, or more often if advised.    Have a diabetes test (fasting glucose) every three years. If you are at risk for diabetes, you should have this test more often.     If you are at risk for osteoporosis (brittle bone disease), think about having a bone density scan (DEXA).    Shots: Get a flu shot each year. Get a tetanus shot every 10 years.    Nutrition:     Eat at least 5 servings of fruits and vegetables each day.    Eat whole-grain bread, whole-wheat pasta and brown rice instead of white grains and rice.    Get adequate Calcium and Vitamin D.     Lifestyle    Exercise at least 150 minutes a week (30 minutes a day, 5 days a week). This will help you control your weight and prevent disease.    Limit alcohol to one drink per day.    No smoking.     Wear sunscreen to prevent skin cancer.     See your dentist every six months for an exam and cleaning.    See your eye doctor every 1 to 2 years.

## 2019-08-16 NOTE — RESULT ENCOUNTER NOTE
Afia Joyce,    Attached are your test results.  -Normal red blood cell (hgb) levels, normal white blood cell count and normal platelet levels.  -Cholesterol levels (LDL,HDL, Triglycerides) are normal.  ADVISE: rechecking in 1 year.   -Liver and gallbladder tests (ALT,AST, Alk phos,bilirubin) are normal.  -Kidney function (GFR) is decreased.  ADVISE: need to make follow up with nephrology and start back on lisinopril   -Sodium is normal.  -Potassium is normal.  -Calcium is normal.  -Glucose is normal.  -TSH (thyroid stimulating hormone) level is normal which indicates normal thyroid function.  -Microalbumin (urine protein) level is elevated. This is suggestive of early damage to your kidneys from high blood pressure.  ADVISE: avoiding anti-inflamatory agents such as ibuprofen (Advil, Motrin) or naproxen (Aleve) as much as possible, keeping your blood pressure in a normal range, and starting treatment with an ACE inhibitor medication called lisinopril    Please contact us if you have any questions.    Tianna Vieyra, CNP

## 2019-09-28 ENCOUNTER — HEALTH MAINTENANCE LETTER (OUTPATIENT)
Age: 49
End: 2019-09-28

## 2019-10-21 ENCOUNTER — PRE VISIT (OUTPATIENT)
Dept: NEPHROLOGY | Facility: CLINIC | Age: 49
End: 2019-10-21

## 2019-10-21 DIAGNOSIS — Q61.3 POLYCYSTIC KIDNEY: ICD-10-CM

## 2019-10-21 DIAGNOSIS — N18.30 CKD (CHRONIC KIDNEY DISEASE) STAGE 3, GFR 30-59 ML/MIN (H): Primary | ICD-10-CM

## 2019-10-21 NOTE — TELEPHONE ENCOUNTER
Left message for patient to return call to complete Pre-visit. Patient needs to be notified of lab work scheduled prior and would also like to get repeat renal US done if patient agreeable.     Nephrology Note: Nursing Pre Visit Encounter    PRE VISIT INFORMATION:                                                    Sheri Joyce is scheduled for future visit at [facility] specialty clinic: [facility]    Patient is scheduled to see Dr. Davis on 10/23/19  Reason for visit: CKD (chronic kidney disease) stage 3, GFR 30-59 ml/min (H) [N18.3]   Hx of transplant: NO  Currently on dialysis: NO  Referring provider Tessa Vieyra  Has patient seen previous specialist? Yes.  Name of provider Rosendo Bradshaw MD, Clinic/Facility [facility].       REVIEW:                                                      Medical Records: Available in chart.  Patient was previously seen at a [facility] or [facility] facility.          Medication reconciliation complete:   Are you taking any NSAID medications?   Renal Imaging?    Allergies Reviewed?       PLAN/FOLLOW-UP NEEDED:                                                        Patient Reminders Given:  Please, make sure you bring an updated list of your medications and/or labeled medication bottles.  Do not fast for labs, unless instructed.   If you are having a procedure, please, present 15 minutes early.  If you need to cancel or reschedule, please call [phone] or [phone].    Valorie Louis RN

## 2019-10-31 ENCOUNTER — ANCILLARY PROCEDURE (OUTPATIENT)
Dept: CT IMAGING | Facility: CLINIC | Age: 49
End: 2019-10-31
Attending: NURSE PRACTITIONER
Payer: COMMERCIAL

## 2019-10-31 ENCOUNTER — OFFICE VISIT (OUTPATIENT)
Dept: PEDIATRICS | Facility: CLINIC | Age: 49
End: 2019-10-31
Payer: COMMERCIAL

## 2019-10-31 VITALS
TEMPERATURE: 98.1 F | BODY MASS INDEX: 26.68 KG/M2 | OXYGEN SATURATION: 100 % | DIASTOLIC BLOOD PRESSURE: 90 MMHG | HEART RATE: 73 BPM | WEIGHT: 151.8 LBS | SYSTOLIC BLOOD PRESSURE: 150 MMHG

## 2019-10-31 DIAGNOSIS — Q61.3 POLYCYSTIC KIDNEY: ICD-10-CM

## 2019-10-31 DIAGNOSIS — N18.30 CKD (CHRONIC KIDNEY DISEASE) STAGE 3, GFR 30-59 ML/MIN (H): ICD-10-CM

## 2019-10-31 DIAGNOSIS — R10.84 ABDOMINAL PAIN, GENERALIZED: Primary | ICD-10-CM

## 2019-10-31 DIAGNOSIS — R10.84 ABDOMINAL PAIN, GENERALIZED: ICD-10-CM

## 2019-10-31 LAB
ALBUMIN SERPL-MCNC: 3.7 G/DL (ref 3.4–5)
ALBUMIN UR-MCNC: 10 MG/DL
ALP SERPL-CCNC: 49 U/L (ref 40–150)
ALT SERPL W P-5'-P-CCNC: 13 U/L (ref 0–50)
ANION GAP SERPL CALCULATED.3IONS-SCNC: 2 MMOL/L (ref 3–14)
APPEARANCE UR: CLEAR
AST SERPL W P-5'-P-CCNC: 10 U/L (ref 0–45)
BILIRUB SERPL-MCNC: 0.2 MG/DL (ref 0.2–1.3)
BILIRUB UR QL STRIP: NEGATIVE
BUN SERPL-MCNC: 28 MG/DL (ref 7–30)
CALCIUM SERPL-MCNC: 8.7 MG/DL (ref 8.5–10.1)
CHLORIDE SERPL-SCNC: 111 MMOL/L (ref 94–109)
CO2 SERPL-SCNC: 25 MMOL/L (ref 20–32)
COLOR UR AUTO: ABNORMAL
CREAT SERPL-MCNC: 1.83 MG/DL (ref 0.52–1.04)
CREAT UR-MCNC: 43 MG/DL
ERYTHROCYTE [DISTWIDTH] IN BLOOD BY AUTOMATED COUNT: 13.2 % (ref 10–15)
GFR SERPL CREATININE-BSD FRML MDRD: 32 ML/MIN/{1.73_M2}
GLUCOSE SERPL-MCNC: 89 MG/DL (ref 70–99)
GLUCOSE UR STRIP-MCNC: NEGATIVE MG/DL
HCT VFR BLD AUTO: 44.6 % (ref 35–47)
HGB BLD-MCNC: 14.7 G/DL (ref 11.7–15.7)
HGB UR QL STRIP: NEGATIVE
KETONES UR STRIP-MCNC: NEGATIVE MG/DL
LEUKOCYTE ESTERASE UR QL STRIP: NEGATIVE
MCH RBC QN AUTO: 30.9 PG (ref 26.5–33)
MCHC RBC AUTO-ENTMCNC: 33 G/DL (ref 31.5–36.5)
MCV RBC AUTO: 94 FL (ref 78–100)
MICROALBUMIN UR-MCNC: 98 MG/L
MICROALBUMIN/CREAT UR: 228.97 MG/G CR (ref 0–25)
MUCOUS THREADS #/AREA URNS LPF: PRESENT /LPF
NITRATE UR QL: NEGATIVE
PH UR STRIP: 6 PH (ref 5–7)
PLATELET # BLD AUTO: 207 10E9/L (ref 150–450)
POTASSIUM SERPL-SCNC: 4.5 MMOL/L (ref 3.4–5.3)
PROT SERPL-MCNC: 8 G/DL (ref 6.8–8.8)
PROT UR-MCNC: 0.23 G/L
PROT/CREAT 24H UR: 0.54 G/G CR (ref 0–0.2)
PTH-INTACT SERPL-MCNC: 72 PG/ML (ref 18–80)
RBC # BLD AUTO: 4.76 10E12/L (ref 3.8–5.2)
RBC #/AREA URNS AUTO: ABNORMAL /HPF
SODIUM SERPL-SCNC: 138 MMOL/L (ref 133–144)
SOURCE: ABNORMAL
SP GR UR STRIP: 1.01 (ref 1–1.03)
UROBILINOGEN UR STRIP-MCNC: NORMAL MG/DL (ref 0–2)
WBC # BLD AUTO: 9.2 10E9/L (ref 4–11)
WBC #/AREA URNS AUTO: ABNORMAL /HPF

## 2019-10-31 PROCEDURE — 36415 COLL VENOUS BLD VENIPUNCTURE: CPT | Performed by: NURSE PRACTITIONER

## 2019-10-31 PROCEDURE — 83970 ASSAY OF PARATHORMONE: CPT | Performed by: INTERNAL MEDICINE

## 2019-10-31 PROCEDURE — 84156 ASSAY OF PROTEIN URINE: CPT | Performed by: INTERNAL MEDICINE

## 2019-10-31 PROCEDURE — 80053 COMPREHEN METABOLIC PANEL: CPT | Performed by: NURSE PRACTITIONER

## 2019-10-31 PROCEDURE — 99214 OFFICE O/P EST MOD 30 MIN: CPT | Performed by: NURSE PRACTITIONER

## 2019-10-31 PROCEDURE — 82306 VITAMIN D 25 HYDROXY: CPT | Performed by: INTERNAL MEDICINE

## 2019-10-31 PROCEDURE — 74176 CT ABD & PELVIS W/O CONTRAST: CPT | Performed by: RADIOLOGY

## 2019-10-31 PROCEDURE — 81001 URINALYSIS AUTO W/SCOPE: CPT | Performed by: NURSE PRACTITIONER

## 2019-10-31 PROCEDURE — 82043 UR ALBUMIN QUANTITATIVE: CPT | Performed by: INTERNAL MEDICINE

## 2019-10-31 PROCEDURE — 85027 COMPLETE CBC AUTOMATED: CPT | Performed by: NURSE PRACTITIONER

## 2019-10-31 NOTE — NURSING NOTE
"Chief Complaint   Patient presents with     Cyst     kidney cyst burst yesterday. having a lot of pain and will need work note       Initial BP (!) 150/90 (BP Location: Right arm, Patient Position: Sitting, Cuff Size: Adult Regular)   Pulse 73   Temp 98.1  F (36.7  C) (Temporal)   Wt 68.9 kg (151 lb 12.8 oz)   SpO2 100%   Breastfeeding? No   BMI 26.68 kg/m   Estimated body mass index is 26.68 kg/m  as calculated from the following:    Height as of 8/15/19: 1.607 m (5' 3.25\").    Weight as of this encounter: 68.9 kg (151 lb 12.8 oz).  Medication Reconciliation: complete      JOSE Sullivan      "

## 2019-10-31 NOTE — PATIENT INSTRUCTIONS
PLAN:   1.   Symptomatic therapy suggested: rest, increase fluids and call prn if symptoms persist or worsen.  2.  Orders Placed This Encounter   Procedures     CT Abdomen Pelvis w/o Contrast     CBC with platelets     Comprehensive metabolic panel     UA with Microscopic reflex to Culture     3. Patient needs to follow up in if no improvement,or sooner if worsening of symptoms or other symptoms develop.  FURTHER TESTING:       - CT abdomen and pelvis   Will follow up and/or notify patient of  results via My Chart to determine further need for followup      
week(s)

## 2019-10-31 NOTE — PROGRESS NOTES
Subjective     Sheri Joyce is a 49 year old female who presents to clinic today for the following health issues:    HPI     Kidney cyst burst yesterday. Having a lot of pain in the abdominal and some soreness in the back,will need work note.missed work yesterday and off today. Currently rates her pain 8/10. Denies any fevers or urinary symptoms. Not using anything for pain.    Has abdominal pain which she states is similar to previous episodes of cyst rupture   Started yesterday. Missed work yesterday as was sent home due to the pain   Urine is clear.       Patient Active Problem List   Diagnosis     Acquired hypothyroidism     Polycystic kidney     Family history of malignant neoplasm of breast     Contraception     CARDIOVASCULAR SCREENING; LDL GOAL LESS THAN 100     Frequent UTI     Hypertension goal BP (blood pressure) < 130/80     Tobacco abuse     CKD (chronic kidney disease) stage 3, GFR 30-59 ml/min (H)     Polycystic kidney, unspecified type     Chronic kidney disease     Past Surgical History:   Procedure Laterality Date     LAPAROSCOPIC DECORTICATION CYST RENAL  2006     MYRINGOTOMY, INSERT TUBE(S), ADENOIDECTOMY, COMBINED       PE TUBES  age 13     SURGICAL HISTORY OF -   11/05    kidney surgery for cyst removal       Social History     Tobacco Use     Smoking status: Current Every Day Smoker     Packs/day: 0.50     Years: 10.00     Pack years: 5.00     Types: Cigarettes     Smokeless tobacco: Never Used   Substance Use Topics     Alcohol use: No     Family History   Problem Relation Age of Onset     Cancer Mother         lung     Diabetes No family hx of      C.A.D. No family hx of      Hypertension No family hx of      Cerebrovascular Disease No family hx of      Breast Cancer No family hx of      Cancer - colorectal No family hx of      Prostate Cancer No family hx of          Current Outpatient Medications   Medication Sig Dispense Refill     cyclobenzaprine (FLEXERIL) 10 MG tablet Take 1  tablet (10 mg) by mouth every evening as needed 30 tablet 3     lisinopril (PRINIVIL/ZESTRIL) 10 MG tablet Take 1 tablet (10 mg) by mouth daily 90 tablet 3     varenicline (CHANTIX AP) 0.5 MG X 11 & 1 MG X 42 tablet Take 0.5 mg tab daily for 3 days, THEN 0.5 mg tab twice daily for 4 days, THEN 1 mg twice daily. 53 tablet 0     varenicline (CHANTIX) 1 MG tablet Take 1 tablet (1 mg) by mouth 2 times daily 60 tablet 2     Allergies   Allergen Reactions     Bactrim [Sulfamethoxazole W/Trimethoprim] Itching     Pcn [Penicillin G]      BP Readings from Last 3 Encounters:   10/31/19 (!) 150/90   08/15/19 (!) 141/80   10/23/18 132/88    Wt Readings from Last 3 Encounters:   10/31/19 68.9 kg (151 lb 12.8 oz)   08/15/19 71.1 kg (156 lb 12.8 oz)   10/23/18 67 kg (147 lb 12.8 oz)            Reviewed and updated as needed this visit by Provider         Review of Systems   ROS COMP: CONSTITUTIONAL:POSITIVE  for chills all the time and NEGATIVE  for anorexia  ENT/MOUTH: NEGATIVE for ear, mouth and throat problems  RESP:NEGATIVE for significant cough or SOB  CV: NEGATIVE for chest pain, palpitations or peripheral edema  Blood pressure Has been elevated she states due to the pain   GI: POSITIVE for abdominal pain generalized and NEGATIVE for jaundice, melena, vomiting and weight loss  : negative for, dysuria and hematuria  MUSCULOSKELETAL: NEGATIVE for significant arthralgias or myalgia  ENDOCRINE: NEGATIVE for temperature intolerance, skin/hair changes  HEME/ALLERGY/IMMUNE: NEGATIVE for bleeding problems      Objective    BP (!) 150/90 (BP Location: Right arm, Patient Position: Sitting, Cuff Size: Adult Regular)   Pulse 73   Temp 98.1  F (36.7  C) (Temporal)   Wt 68.9 kg (151 lb 12.8 oz)   SpO2 100%   Breastfeeding? No   BMI 26.68 kg/m    Body mass index is 26.68 kg/m .   Wt Readings from Last 4 Encounters:   10/31/19 68.9 kg (151 lb 12.8 oz)   08/15/19 71.1 kg (156 lb 12.8 oz)   10/23/18 67 kg (147 lb 12.8 oz)   06/27/18  69.9 kg (154 lb)       Physical Exam   GENERAL APPEARANCE: alert, active and no distress  RESP: lungs clear to auscultation - no rales, rhonchi or wheezes  CV: regular rates and rhythm  ABDOMEN: mild and moderate tenderness in the entire abdomen area, no rebound tenderness, no guarding or rigidity, Bilateral  CVA tenderness, no herniae noted, no masses noted  MS: extremities normal- no gross deformities noted  SKIN: no suspicious lesions or rashes  NEURO: Normal strength and tone, mentation intact and speech normal  PSYCH: mentation appears normal and affect normal/bright    Diagnostic Test Results:  Results for orders placed or performed in visit on 10/31/19   Protein  random urine with Creat Ratio     Status: Abnormal   Result Value Ref Range    Protein Random Urine 0.23 g/L    Protein Total Urine g/gr Creatinine 0.54 (H) 0 - 0.2 g/g Cr   Vitamin D Deficiency     Status: None   Result Value Ref Range    Vitamin D Deficiency screening 21 20 - 75 ug/L   Parathyroid Hormone Intact     Status: None   Result Value Ref Range    Parathyroid Hormone Intact 72 18 - 80 pg/mL   Albumin Random Urine Quantitative with Creat Ratio     Status: Abnormal   Result Value Ref Range    Creatinine Urine 43 mg/dL    Albumin Urine mg/L 98 mg/L    Albumin Urine mg/g Cr 228.97 (H) 0 - 25 mg/g Cr   Results for orders placed or performed in visit on 10/31/19   CBC with platelets     Status: None   Result Value Ref Range    WBC 9.2 4.0 - 11.0 10e9/L    RBC Count 4.76 3.8 - 5.2 10e12/L    Hemoglobin 14.7 11.7 - 15.7 g/dL    Hematocrit 44.6 35.0 - 47.0 %    MCV 94 78 - 100 fl    MCH 30.9 26.5 - 33.0 pg    MCHC 33.0 31.5 - 36.5 g/dL    RDW 13.2 10.0 - 15.0 %    Platelet Count 207 150 - 450 10e9/L   Comprehensive metabolic panel     Status: Abnormal   Result Value Ref Range    Sodium 138 133 - 144 mmol/L    Potassium 4.5 3.4 - 5.3 mmol/L    Chloride 111 (H) 94 - 109 mmol/L    Carbon Dioxide 25 20 - 32 mmol/L    Anion Gap 2 (L) 3 - 14 mmol/L     Glucose 89 70 - 99 mg/dL    Urea Nitrogen 28 7 - 30 mg/dL    Creatinine 1.83 (H) 0.52 - 1.04 mg/dL    GFR Estimate 32 (L) >60 mL/min/[1.73_m2]    GFR Estimate If Black 37 (L) >60 mL/min/[1.73_m2]    Calcium 8.7 8.5 - 10.1 mg/dL    Bilirubin Total 0.2 0.2 - 1.3 mg/dL    Albumin 3.7 3.4 - 5.0 g/dL    Protein Total 8.0 6.8 - 8.8 g/dL    Alkaline Phosphatase 49 40 - 150 U/L    ALT 13 0 - 50 U/L    AST 10 0 - 45 U/L   UA with Microscopic reflex to Culture     Status: Abnormal   Result Value Ref Range    Color Urine Light Yellow     Appearance Urine Clear     Glucose Urine Negative NEG^Negative mg/dL    Bilirubin Urine Negative NEG^Negative    Ketones Urine Negative NEG^Negative mg/dL    Specific Gravity Urine 1.008 1.003 - 1.035    Blood Urine Negative NEG^Negative    pH Urine 6.0 5.0 - 7.0 pH    Protein Albumin Urine 10 (A) NEG^Negative mg/dL    Urobilinogen mg/dL Normal 0.0 - 2.0 mg/dL    Nitrite Urine Negative NEG^Negative    Leukocyte Esterase Urine Negative NEG^Negative    Source Midstream Urine     WBC Urine 0 - 5 OTO5^0 - 5 /HPF    RBC Urine O - 2 OTO2^O - 2 /HPF    Mucous Urine Present (A) NEG^Negative /LPF     Recent Results (from the past 744 hour(s))   CT Abdomen Pelvis w/o Contrast    Narrative    EXAMINATION: CT ABDOMEN PELVIS W/O CONTRAST, 10/31/2019 4:12 PM    TECHNIQUE:  Helical CT images from the lung bases through the  symphysis pubis were obtained without IV contrast.     COMPARISON: CT 10/5/2011    HISTORY: Polycystic kidney; Abdominal pain, generalized    FINDINGS:    Abdomen and pelvis: Innumerable hypoattenuating cysts of varying  shapes and sizes are seen diffusely throughout the hepatic parenchyma.  Additionally, there is near complete replacement of the renal  parenchyma bilaterally with innumerable renal cysts of varying sizes,  consistent with patient's diagnosis of polycystic kidney disease. A  few of the cysts demonstrate peripheral calcification. No  hydronephrosis, nephrolithiasis or  hydroureter. The urinary bladder is  distended without focal wall thickening.    The gallbladder is decompressed. No intra or extrahepatic biliary  ductal dilation. The spleen, pancreas and adrenal glands are within  normal limits.    The large and small bowel are normal in caliber. No bowel wall  thickening or inflammation. No free fluid or air in the abdomen or  pelvis. No enlarged lymph nodes in the abdomen or pelvis. Vaginal  tampon.    The abdominal aorta and iliac vessels are normal in caliber. No  abdominal aortic aneurysm.    Lung bases: No pulmonary consolidation. No suspicious mass. No pleural  or pericardial effusions. The heart is normal in size.    Bones and soft tissues: No acute or suspicious appearing osseous or  soft tissue lesions. Mild degenerative changes of the spine most  pronounced at L5-S1 with opposing endplate sclerosis.      Impression    IMPRESSION:   1. Sequela of polycystic kidney disease with innumerable cysts  replacing the hepatic and renal parenchyma.  2. No acute findings explain patient's abdominal pain.     I have personally reviewed the examination and initial interpretation  and I agree with the findings.    ANTONIA HEATH MD             Assessment & Emily Wade was seen today for cyst.    Diagnoses and all orders for this visit:    Abdominal pain, generalized  -     CBC with platelets  -     Comprehensive metabolic panel  -     CT Abdomen Pelvis w/o Contrast; Future  -     UA with Microscopic reflex to Culture  Will follow up and/or notify patient of  results via My Chart to determine further need for followup    CKD (chronic kidney disease) stage 3, GFR 30-59 ml/min (H)  FOLLOW UP WITH SPECIALIST :Nephrology    Polycystic kidney  -     CBC with platelets  -     Comprehensive metabolic panel  -     CT Abdomen Pelvis w/o Contrast; Future  -     UA with Microscopic reflex to Culture  Will follow up and/or notify patient of  results via My Chart to determine further need  for followup  FOLLOW UP WITH SPECIALIST :Nephrology      See Patient Instructions  Patient Instructions     PLAN:   1.   Symptomatic therapy suggested: rest, increase fluids and call prn if symptoms persist or worsen.  2.  Orders Placed This Encounter   Procedures     CT Abdomen Pelvis w/o Contrast     CBC with platelets     Comprehensive metabolic panel     UA with Microscopic reflex to Culture     3. Patient needs to follow up in if no improvement,or sooner if worsening of symptoms or other symptoms develop.  FURTHER TESTING:       - CT abdomen and pelvis   Will follow up and/or notify patient of  results via My Chart to determine further need for followup      Tobacco Cessation:   reports that she has been smoking cigarettes. She has a 5.00 pack-year smoking history. She has never used smokeless tobacco.  Tobacco Cessation Action Plan: Information offered: Patient not interested at this time        No follow-ups on file.    RYDER Green CNP  [facility]

## 2019-10-31 NOTE — LETTER
October 31, 2019      RE: Sheri Joyce  [address]  [address]  [facility] [address]       To whom it may concern:    Sheri Joyce was seen in our clinic today. She  may return to work with the following: No working or lifting restrictions on or about 11/4/19.    Sincerely,      Tianna Vieyra RN, CNP

## 2019-11-01 LAB — DEPRECATED CALCIDIOL+CALCIFEROL SERPL-MC: 21 UG/L (ref 20–75)

## 2019-11-01 NOTE — RESULT ENCOUNTER NOTE
Afia Joyce,    Attached are your test results.  Nothing new on CT but numerous cysts   Please keep appointment with Nephrologist as planned   Patient needs to follow up in if no improvement,or sooner if worsening of symptoms or other symptoms develop.   Please contact us if you have any questions.    Tianna Vieyra, CNP

## 2019-11-02 NOTE — RESULT ENCOUNTER NOTE
Dr Maurice Chappell Is out of the office and we are reviewing your results for you.  Please follow up if further concerns or questions     Attached are your test results.  -Normal red blood cell (hgb) levels, normal white blood cell count and normal platelet levels.  -Liver and gallbladder tests (ALT,AST, Alk phos,bilirubin) are normal.  -Kidney function (GFR) is decreased.  ADVISE: however appears stable   -Sodium is normal.  -Potassium is normal.  -Calcium is normal.  -Glucose is normal.  -Urine is normal.   Please contact us if you have any questions.    Tianna Vieyra, CNP

## 2020-01-08 ENCOUNTER — OFFICE VISIT (OUTPATIENT)
Dept: NEPHROLOGY | Facility: CLINIC | Age: 50
End: 2020-01-08
Attending: NURSE PRACTITIONER
Payer: COMMERCIAL

## 2020-01-08 VITALS
WEIGHT: 148 LBS | BODY MASS INDEX: 26.01 KG/M2 | DIASTOLIC BLOOD PRESSURE: 90 MMHG | OXYGEN SATURATION: 100 % | TEMPERATURE: 97.6 F | HEART RATE: 54 BPM | SYSTOLIC BLOOD PRESSURE: 154 MMHG

## 2020-01-08 DIAGNOSIS — Q61.2 ADPKD (AUTOSOMAL DOMINANT POLYCYSTIC KIDNEY DISEASE): ICD-10-CM

## 2020-01-08 DIAGNOSIS — I10 HYPERTENSION GOAL BP (BLOOD PRESSURE) < 130/80: ICD-10-CM

## 2020-01-08 DIAGNOSIS — N18.30 CKD (CHRONIC KIDNEY DISEASE) STAGE 3, GFR 30-59 ML/MIN (H): Primary | ICD-10-CM

## 2020-01-08 DIAGNOSIS — N18.30 CKD (CHRONIC KIDNEY DISEASE) STAGE 3, GFR 30-59 ML/MIN (H): ICD-10-CM

## 2020-01-08 LAB
ANION GAP SERPL CALCULATED.3IONS-SCNC: 2 MMOL/L (ref 3–14)
BUN SERPL-MCNC: 20 MG/DL (ref 7–30)
CALCIUM SERPL-MCNC: 9.2 MG/DL (ref 8.5–10.1)
CHLORIDE SERPL-SCNC: 108 MMOL/L (ref 94–109)
CO2 SERPL-SCNC: 27 MMOL/L (ref 20–32)
CREAT SERPL-MCNC: 1.69 MG/DL (ref 0.52–1.04)
GFR SERPL CREATININE-BSD FRML MDRD: 35 ML/MIN/{1.73_M2}
GLUCOSE SERPL-MCNC: 106 MG/DL (ref 70–99)
POTASSIUM SERPL-SCNC: 4.1 MMOL/L (ref 3.4–5.3)
SODIUM SERPL-SCNC: 137 MMOL/L (ref 133–144)

## 2020-01-08 PROCEDURE — 99205 OFFICE O/P NEW HI 60 MIN: CPT | Performed by: INTERNAL MEDICINE

## 2020-01-08 PROCEDURE — 36415 COLL VENOUS BLD VENIPUNCTURE: CPT | Performed by: INTERNAL MEDICINE

## 2020-01-08 PROCEDURE — 80048 BASIC METABOLIC PNL TOTAL CA: CPT | Performed by: INTERNAL MEDICINE

## 2020-01-08 ASSESSMENT — PAIN SCALES - GENERAL: PAINLEVEL: MODERATE PAIN (5)

## 2020-01-08 NOTE — LETTER
January 8, 2020      Re: Sheri Joyce  [address]  [address]  [facility] [address]              To whom it may concern,    Ms. Sheri Joyce was seen in my clinic today for progressing kidney disease. After assessment, she is approved to return to work without restrictions at this time. Please feel free to contact my clinic with any questions.      Sincerely,      Jayne Davis MD  Division of Renal Diseases and Hypertension  [facility]

## 2020-01-08 NOTE — PATIENT INSTRUCTIONS
--Increase the dose of Lisinopril to 20 mg daily from 10 mg daily for 1 week.  --In the second week, increase the dose further to 40 mg daily  --Recheck labs in 2 weeks

## 2020-02-03 NOTE — PROGRESS NOTES
January 8, 2020    I was asked to see this patient by Tianna Vieyra CNP    CC: ADPKD     HPI: Sheri Joyce is a 49 year old female who presents for evaluation of ADPKD  She first found out about her kidney disease 25 years ago when she had an US done in the ER after presenting for abdominal pain. Her first pregnancy was before this and uneventful however her second pregnancy was complicated with hypertension and she was told to be on bed rest. She was asked to be on antihypertensives however she did not have insurance and had not been taking any antihypertensives. She started taking Lisinopril 6 months ago. She does not check her blood pressures at home. She has had cyst rupture causing abdominal pain last October. She reports that her daughter also has cysts in her kidneys. She denies any family history of kidney diseases particularly polycystic kidney disease.   Patient denies any LE edema, exertional dyspnea/orthopnea/PND, dizziness, lightheadedness, nausea, vomiting or diarrhea.     - History of Hematuria: no  - Swelling: no  - Hx of UTIs: no  - Hx of stones: no  - Rashes/Joint pain: no  - Family hx of kidney disease: no  - NSAID use: no    Assessment/Plan:     # ADPKD/CKD stage 3: has a baseline creatinine of 1.7-1.8 mg/dl. UA with protein, no hematuria. Urine P/Cr ratio of 0.54 g/g. She has had complications such as recurrent UTI's in the past, and cyst rupture more recently. She has had deroofing procedure done in 2006. She has had MRI brain in 2005 which was negative for aneurysms.   --She has had slow progression of her kidney disease overtime however her recent increase in creatinine is due to restarting Lisinopril.   --Will further increase her Lisinopril from 10 mg to 20 mg daily for 1 week   --then further increase it to 40 mg daily.   --will recheck labs in 2 weeks  --will measure her kidneys to see if she is a good candidate for Tolvaptan.     # HTN: BP above the goal of 140/90 mm of Hg. She  "appears euvolemic on exam. Increase lisinopril as mentioned above.     She does not have other metabolic complications of CKD    Jayne Davis MD     Allergies   Allergen Reactions     Bactrim [Sulfamethoxazole W/Trimethoprim] Itching     Pcn [Penicillin G]        cyclobenzaprine (FLEXERIL) 10 MG tablet, Take 1 tablet (10 mg) by mouth every evening as needed  lisinopril (PRINIVIL/ZESTRIL) 10 MG tablet, Take 1 tablet (10 mg) by mouth daily  varenicline (CHANTIX AP) 0.5 MG X 11 & 1 MG X 42 tablet, Take 0.5 mg tab daily for 3 days, THEN 0.5 mg tab twice daily for 4 days, THEN 1 mg twice daily. (Patient not taking: Reported on 1/8/2020)  varenicline (CHANTIX) 1 MG tablet, Take 1 tablet (1 mg) by mouth 2 times daily (Patient not taking: Reported on 1/8/2020)    No current facility-administered medications on file prior to visit.       Past Medical History:   Diagnosis Date     Chronic kidney disease      Contraception 3/19/2009     Hypertension      Liver disease      Polycystic kidney, unspecified type      Thyroid disease      Varicosities        Past Surgical History:   Procedure Laterality Date     LAPAROSCOPIC DECORTICATION CYST RENAL  2006     MYRINGOTOMY, INSERT TUBE(S), ADENOIDECTOMY, COMBINED       PE TUBES  age 13     SURGICAL HISTORY OF -   11/05    kidney surgery for cyst removal       Social History     Tobacco Use     Smoking status: Current Every Day Smoker     Packs/day: 0.50     Years: 10.00     Pack years: 5.00     Types: Cigarettes     Smokeless tobacco: Never Used   Substance Use Topics     Alcohol use: No     Drug use: No     Comment: sober x \"years\"       Family History   Problem Relation Age of Onset     Cancer Mother         lung     Diabetes No family hx of      C.A.D. No family hx of      Hypertension No family hx of      Cerebrovascular Disease No family hx of      Breast Cancer No family hx of      Cancer - colorectal No family hx of      Prostate Cancer No family hx of        ROS: A 12 " system review of systems was negative other than noted here or above.     Exam:  BP (!) 154/90 (BP Location: Right arm, Patient Position: Sitting)   Pulse 54   Temp 97.6  F (36.4  C) (Oral)   Wt 67.1 kg (148 lb)   SpO2 100%   BMI 26.01 kg/m      GENERAL APPEARANCE: alert and no distress  EYES: PERRL, no scleral icterus  HENT: mouth without ulcers or lesions  NECK: supple, no adenopathy  RESP: lungs clear to auscultation   CV: regular rhythm, normal rate, no rub  Extremities: no clubbing, cyanosis, or edema  SKIN: no rash  NEURO: mentation intact and speech normal  PSYCH: affect normal/bright    Results:    Orders Only on 01/08/2020   Component Date Value Ref Range Status     Sodium 01/08/2020 137  133 - 144 mmol/L Final     Potassium 01/08/2020 4.1  3.4 - 5.3 mmol/L Final     Chloride 01/08/2020 108  94 - 109 mmol/L Final     Carbon Dioxide 01/08/2020 27  20 - 32 mmol/L Final     Anion Gap 01/08/2020 2* 3 - 14 mmol/L Final     Glucose 01/08/2020 106* 70 - 99 mg/dL Final     Urea Nitrogen 01/08/2020 20  7 - 30 mg/dL Final     Creatinine 01/08/2020 1.69* 0.52 - 1.04 mg/dL Final     GFR Estimate 01/08/2020 35* >60 mL/min/[1.73_m2] Final    Comment: Non  GFR Calc  Starting 12/18/2018, serum creatinine based estimated GFR (eGFR) will be   calculated using the Chronic Kidney Disease Epidemiology Collaboration   (CKD-EPI) equation.       GFR Estimate If Black 01/08/2020 40* >60 mL/min/[1.73_m2] Final    Comment:  GFR Calc  Starting 12/18/2018, serum creatinine based estimated GFR (eGFR) will be   calculated using the Chronic Kidney Disease Epidemiology Collaboration   (CKD-EPI) equation.       Calcium 01/08/2020 9.2  8.5 - 10.1 mg/dL Final

## 2020-04-09 ENCOUNTER — TELEPHONE (OUTPATIENT)
Dept: OTOLARYNGOLOGY | Facility: CLINIC | Age: 50
End: 2020-04-09

## 2020-04-14 NOTE — TELEPHONE ENCOUNTER
2nd attempt to contact pt.  No answer.  Message left on voice mail that it s important you call back before your appointment scheduled with Dr. Davis  [phone].      (clinic appt needs to be change to Video Visit.)    Amara Ivory LPN

## 2020-04-23 ENCOUNTER — TELEPHONE (OUTPATIENT)
Dept: PEDIATRICS | Facility: CLINIC | Age: 50
End: 2020-04-23

## 2020-04-23 DIAGNOSIS — I10 HYPERTENSION GOAL BP (BLOOD PRESSURE) < 130/80: ICD-10-CM

## 2020-04-23 RX ORDER — LISINOPRIL 40 MG/1
40 TABLET ORAL DAILY
Qty: 90 TABLET | Refills: 1 | Status: SHIPPED | OUTPATIENT
Start: 2020-04-23 | End: 2023-01-23

## 2020-04-23 NOTE — TELEPHONE ENCOUNTER
Per [facility] pharmacy patient is requesting a new script for:    Lisinopril 40 mg tablets?    Per patient chart, patient is currently on lisinopril 10 mg???    Shweta Vaca [facility]    Message routed to Tessa Vieyra

## 2020-04-24 NOTE — TELEPHONE ENCOUNTER
This dose was changed by Dr Davis   It looks like he has been trying to increase it to help her BP and her kidneys   Will send in the new dosage for her

## 2020-04-24 NOTE — TELEPHONE ENCOUNTER
Attempted to call pharm x2, disconnected both times. Will attempt at a later time. Marci Jeronimo LPN

## 2020-08-04 ENCOUNTER — OFFICE VISIT (OUTPATIENT)
Dept: PEDIATRICS | Facility: CLINIC | Age: 50
End: 2020-08-04
Payer: COMMERCIAL

## 2020-08-04 VITALS
OXYGEN SATURATION: 97 % | HEIGHT: 63 IN | HEART RATE: 64 BPM | SYSTOLIC BLOOD PRESSURE: 150 MMHG | DIASTOLIC BLOOD PRESSURE: 85 MMHG | WEIGHT: 165.7 LBS | TEMPERATURE: 97.9 F | BODY MASS INDEX: 29.36 KG/M2

## 2020-08-04 DIAGNOSIS — R39.9 UTI SYMPTOMS: Primary | ICD-10-CM

## 2020-08-04 DIAGNOSIS — Z12.31 ENCOUNTER FOR SCREENING MAMMOGRAM FOR BREAST CANCER: ICD-10-CM

## 2020-08-04 LAB
ALBUMIN UR-MCNC: 30 MG/DL
APPEARANCE UR: CLEAR
BILIRUB UR QL STRIP: NEGATIVE
COLOR UR AUTO: ABNORMAL
GLUCOSE UR STRIP-MCNC: NEGATIVE MG/DL
HGB UR QL STRIP: NEGATIVE
KETONES UR STRIP-MCNC: NEGATIVE MG/DL
LEUKOCYTE ESTERASE UR QL STRIP: NEGATIVE
NITRATE UR QL: NEGATIVE
NON-SQ EPI CELLS #/AREA URNS LPF: ABNORMAL /LPF
PH UR STRIP: 6.5 PH (ref 5–7)
RBC #/AREA URNS AUTO: ABNORMAL /HPF
SOURCE: ABNORMAL
SP GR UR STRIP: 1 (ref 1–1.03)
UROBILINOGEN UR STRIP-MCNC: NORMAL MG/DL (ref 0–2)
WBC #/AREA URNS AUTO: ABNORMAL /HPF

## 2020-08-04 PROCEDURE — 87086 URINE CULTURE/COLONY COUNT: CPT | Performed by: NURSE PRACTITIONER

## 2020-08-04 PROCEDURE — 81001 URINALYSIS AUTO W/SCOPE: CPT | Performed by: NURSE PRACTITIONER

## 2020-08-04 PROCEDURE — 99214 OFFICE O/P EST MOD 30 MIN: CPT | Performed by: NURSE PRACTITIONER

## 2020-08-04 ASSESSMENT — MIFFLIN-ST. JEOR: SCORE: 1335.74

## 2020-08-04 NOTE — PROGRESS NOTES
Subjective     Sheri Joyce is a 50 year old female who presents to clinic today for the following health issues:    HPI   Patient reports having flank pain which began about 2 weeks ago. She has a history of polycystic kidney disease and regularly has some level of pain but the pain was increased at the time and was associated with nausea (she states this can happen when she has cysts rupture). The patient notes that she also gets recurrent UTI's with her polycystic kidney disease. She took tylenol which was helpful for the pain. She also developed dysuria and has had intermittent chills. The pain and nausea resolved about 5 days ago but the dysuria and chills have persisted. She would like to know if she has a UTI. She is drinking lots of fluids to stay hydrated. She reports no risk of STI exposure.     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 leave due to her polycystic kidneys   Will miss once a month at most and will be out 2 days at time   Lifetime illness     URINARY TRACT SYMPTOMS  Onset: 2 weeks    Description:   Painful urination (Dysuria): YES- bladder and kidney pain           Frequency: YES  Blood in urine (Hematuria): no   Delay in urine (Hesitency): YES- on and off    Intensity: mild, moderate    Progression of Symptoms:  waxing and waning    Accompanying Signs & Symptoms:  Fever/chills: YES- chills  Flank pain YES  Nausea and vomiting: YES- nausea  Any vaginal symptoms: none  Abdominal/Pelvic Pain: YES    History:   History of frequent UTI's: YES  History of kidney stones: no   Sexually Active: YES  Possibility of pregnancy: No    Precipitating factors:   none    Therapies Tried and outcome: Cranberry juice prn (contraindicated in Coumadin patients), tylenol  and Increase fluid intake      Patient Active Problem List   Diagnosis     Acquired hypothyroidism     Polycystic kidney     Family history of malignant neoplasm of breast     Contraception     CARDIOVASCULAR SCREENING; LDL GOAL LESS  THAN 100     Frequent UTI     Hypertension goal BP (blood pressure) < 130/80     Tobacco abuse     CKD (chronic kidney disease) stage 3, GFR 30-59 ml/min (H)     Polycystic kidney, unspecified type     Chronic kidney disease     Past Surgical History:   Procedure Laterality Date     LAPAROSCOPIC DECORTICATION CYST RENAL  2006     MYRINGOTOMY, INSERT TUBE(S), ADENOIDECTOMY, COMBINED       PE TUBES  age 13     SURGICAL HISTORY OF -   11/05    kidney surgery for cyst removal       Social History     Tobacco Use     Smoking status: Current Every Day Smoker     Packs/day: 0.50     Years: 10.00     Pack years: 5.00     Types: Cigarettes     Smokeless tobacco: Never Used   Substance Use Topics     Alcohol use: No     Family History   Problem Relation Age of Onset     Cancer Mother         lung     Diabetes No family hx of      C.A.D. No family hx of      Hypertension No family hx of      Cerebrovascular Disease No family hx of      Breast Cancer No family hx of      Cancer - colorectal No family hx of      Prostate Cancer No family hx of          Current Outpatient Medications   Medication Sig Dispense Refill     cyclobenzaprine (FLEXERIL) 10 MG tablet Take 1 tablet (10 mg) by mouth every evening as needed 30 tablet 3     lisinopril (ZESTRIL) 40 MG tablet Take 1 tablet (40 mg) by mouth daily 90 tablet 1     varenicline (CHANTIX) 1 MG tablet Take 1 tablet (1 mg) by mouth 2 times daily 60 tablet 2     Allergies   Allergen Reactions     Bactrim [Sulfamethoxazole W/Trimethoprim] Itching     Pcn [Penicillin G]      BP Readings from Last 3 Encounters:   08/04/20 (!) 150/85   01/08/20 (!) 154/90   10/31/19 (!) 150/90    Wt Readings from Last 3 Encounters:   08/04/20 75.2 kg (165 lb 11.2 oz)   01/08/20 67.1 kg (148 lb)   10/31/19 68.9 kg (151 lb 12.8 oz)           Reviewed and updated as needed this visit by Provider  Problems         Review of Systems   CONSTITUTIONAL:POSITIVE  for chills and NEGATIVE  for fever and weight  "loss  INTEGUMENTARY/SKIN: NEGATIVE for and rash   ENT/MOUTH: NEGATIVE for sore throat  RESP:NEGATIVE for significant cough or SOB  CV: NEGATIVE for chest pain, palpitations  GI: NEGATIVE for constipation, diarrhea, poor appetite and weight loss  : POSITIVE for irregular periods; dysuria, hesitancy NEGATIVE for dyspaurenia  MUSCULOSKELETAL: POSITIVE  for extremity/joint aches with activity in ankles; on feet a lot and NEGATIVE for myalgia  ENDOCRINE: NEGATIVE for temperature intolerance, skin/hair changes      Objective    BP (!) 150/85 (BP Location: Right arm, Patient Position: Sitting, Cuff Size: Adult Regular)   Pulse 64   Temp 97.9  F (36.6  C) (Temporal)   Ht 1.6 m (5' 3\")   Wt 75.2 kg (165 lb 11.2 oz)   LMP  (LMP Unknown)   SpO2 97%   Breastfeeding No   BMI 29.35 kg/m    Body mass index is 29.35 kg/m .  Physical Exam   GENERAL APPEARANCE: healthy, alert and no distress  RESP: lungs clear to auscultation - no rales, rhonchi or wheezes  CV: regular rates and rhythm, normal S1 S2, no S3 or S4 and no murmur, click or rub; no peripheral edema  ABDOMEN: bowel sounds normal; CVA tenderness bilaterally; mild tenderness to deep palpation of right and left upper quadrants  PSYCH: mentation appears normal and affect normal/bright      Diagnostic Test Results:  Labs reviewed in Epic  Results for orders placed or performed in visit on 08/04/20 (from the past 24 hour(s))   UA with Microscopic reflex to Culture    Specimen: Midstream Urine   Result Value Ref Range    Color Urine Light Yellow     Appearance Urine Clear     Glucose Urine Negative NEG^Negative mg/dL    Bilirubin Urine Negative NEG^Negative    Ketones Urine Negative NEG^Negative mg/dL    Specific Gravity Urine 1.005 1.003 - 1.035    Blood Urine Negative NEG^Negative    pH Urine 6.5 5.0 - 7.0 pH    Protein Albumin Urine 30 (A) NEG^Negative mg/dL    Urobilinogen mg/dL Normal 0.0 - 2.0 mg/dL    Nitrite Urine Negative NEG^Negative    Leukocyte Esterase " Urine Negative NEG^Negative    Source Midstream Urine     WBC Urine 0 - 5 OTO5^0 - 5 /HPF    RBC Urine O - 2 OTO2^O - 2 /HPF    Squamous Epithelial /LPF Urine Moderate (A) FEW^Few /LPF           Assessment & Plan     Sheri was seen today for uti.    Diagnoses and all orders for this visit:    UTI symptoms  -     UA with Microscopic reflex to Culture; Future  -     UA with Microscopic reflex to Culture  -     Urine Culture Aerobic Bacterial    Encounter for screening mammogram for breast cancer  -     MA Screening Digital Bilateral; Future      See Patient Instructions  Patient Instructions     PLAN:   1.   Symptomatic therapy suggested: increase fluids and call prn if symptoms persist or worsen.  2.  Orders Placed This Encounter   Procedures     MA Screening Digital Bilateral     UA with Microscopic reflex to Culture     3. Patient needs to follow up in if no improvement,or sooner if worsening of symptoms or other symptoms develop.  FOLLOW UP WITH SPECIALIST :Nephrology  Mammogram   I will place order. Please call [phone] to schedule.  Will follow up and/or notify patient of  results via My Chart to determine further need for followup  Schedule physical exam     No follow-ups on file.    Tianna Vieyra, APRN CNP  [facility]    Amber Scheierl, SELIN Student

## 2020-08-04 NOTE — NURSING NOTE
"Chief Complaint   Patient presents with     UTI     UTI symptoms x 2 weeks       Initial BP (!) 150/85 (BP Location: Right arm, Patient Position: Sitting, Cuff Size: Adult Regular)   Pulse 64   Temp 97.9  F (36.6  C) (Temporal)   Ht 1.6 m (5' 3\")   Wt 75.2 kg (165 lb 11.2 oz)   LMP  (LMP Unknown)   SpO2 97%   Breastfeeding No   BMI 29.35 kg/m   Estimated body mass index is 29.35 kg/m  as calculated from the following:    Height as of this encounter: 1.6 m (5' 3\").    Weight as of this encounter: 75.2 kg (165 lb 11.2 oz).  Medication Reconciliation: complete      JOSE Sullivan      "

## 2020-08-04 NOTE — PATIENT INSTRUCTIONS
PLAN:   1.   Symptomatic therapy suggested: increase fluids and call prn if symptoms persist or worsen.  2.  Orders Placed This Encounter   Procedures     MA Screening Digital Bilateral     UA with Microscopic reflex to Culture     3. Patient needs to follow up in if no improvement,or sooner if worsening of symptoms or other symptoms develop.  FOLLOW UP WITH SPECIALIST :Nephrology  Mammogram   I will place order. Please call [phone] to schedule.  Will follow up and/or notify patient of  results via My Chart to determine further need for followup  Schedule physical exam

## 2020-08-05 LAB
BACTERIA SPEC CULT: NORMAL
SPECIMEN SOURCE: NORMAL

## 2020-08-05 NOTE — RESULT ENCOUNTER NOTE
Afia Joyce,    Attached are your test results.  -Urine culture show  normal bacteria that are in the genital area.  There is no need for antibiotics at this point.  If new, worsening or persistent symptoms occur, then you should call or return for a recheck.   Please contact us if you have any questions.    Tianna Vieyra, CNP

## 2020-09-14 ENCOUNTER — TELEPHONE (OUTPATIENT)
Dept: PEDIATRICS | Facility: CLINIC | Age: 50
End: 2020-09-14

## 2020-09-14 DIAGNOSIS — Z20.822 EXPOSURE TO COVID-19 VIRUS: Primary | ICD-10-CM

## 2020-09-14 NOTE — TELEPHONE ENCOUNTER
[facility] Call Center    Phone Message    May a detailed message be left on voicemail: yes     Reason for Call: The patient stated she was exposed to Covid via her daughter.  She stated she has a cough due to allergies.  Her employer is requiring a note from her Provider to return to work and they will not accept a Covid test alone.  The patient is scheduled for a video visit tomorrow with Tessa.  Please advise.  Thank you.     Action Taken: Message routed to:  Primary Care p [phone]    Travel Screening: Not Applicable

## 2020-09-14 NOTE — TELEPHONE ENCOUNTER
Patient contacted, she is scheduled to be swabbed tomorrow at 2:45 PM.  Advised to cancel virtual visit with provider for tomorrow, but to schedule once resulted to determine return to work plan.  Patient's daughter was tested on Thurs, notified of positive result on Saturday.  Patient is quarantining at home, remaining in her room and away from others as much as possible.  Advised even if test is negative it may be recommended to remain home for 2 weeks from exposure.      Alicia Billingsley RN

## 2020-09-14 NOTE — TELEPHONE ENCOUNTER
Ordered covid swab and will do visit tomorrow but we can may be get her swab done in the meantime

## 2020-09-15 DIAGNOSIS — Z20.822 EXPOSURE TO COVID-19 VIRUS: ICD-10-CM

## 2020-09-15 PROCEDURE — U0003 INFECTIOUS AGENT DETECTION BY NUCLEIC ACID (DNA OR RNA); SEVERE ACUTE RESPIRATORY SYNDROME CORONAVIRUS 2 (SARS-COV-2) (CORONAVIRUS DISEASE [COVID-19]), AMPLIFIED PROBE TECHNIQUE, MAKING USE OF HIGH THROUGHPUT TECHNOLOGIES AS DESCRIBED BY CMS-2020-01-R: HCPCS | Performed by: NURSE PRACTITIONER

## 2020-09-16 LAB
SARS-COV-2 RNA SPEC QL NAA+PROBE: NOT DETECTED
SPECIMEN SOURCE: NORMAL

## 2020-09-17 NOTE — RESULT ENCOUNTER NOTE
Afia Joyce,    Attached are your test results.  Covid Swab is negative    Please contact us if you have any questions.    Tianna Vieyra, CNP

## 2021-01-10 ENCOUNTER — HEALTH MAINTENANCE LETTER (OUTPATIENT)
Age: 51
End: 2021-01-10

## 2021-03-13 ENCOUNTER — HEALTH MAINTENANCE LETTER (OUTPATIENT)
Age: 51
End: 2021-03-13

## 2021-03-15 ENCOUNTER — TELEPHONE (OUTPATIENT)
Dept: FAMILY MEDICINE | Facility: CLINIC | Age: 51
End: 2021-03-15

## 2021-03-15 NOTE — TELEPHONE ENCOUNTER
Patient Quality Outreach      Summary:    Patient has the following on her problem list/HM:     Hypertension   Last three blood pressure readings:  BP Readings from Last 3 Encounters:   08/04/20 (!) 150/85   01/08/20 (!) 154/90   10/31/19 (!) 150/90     Blood pressure: Failed    HTN Guidelines:  ? 139/89     Patient is due/failing the following:   BP check    Type of outreach:    Sent [employer] message.    Questions for provider review:    None                                                                                                                                     Aby Cabrera

## 2021-10-23 ENCOUNTER — HEALTH MAINTENANCE LETTER (OUTPATIENT)
Age: 51
End: 2021-10-23

## 2022-02-12 ENCOUNTER — HEALTH MAINTENANCE LETTER (OUTPATIENT)
Age: 52
End: 2022-02-12

## 2022-04-09 ENCOUNTER — HEALTH MAINTENANCE LETTER (OUTPATIENT)
Age: 52
End: 2022-04-09

## 2022-07-11 ENCOUNTER — E-VISIT (OUTPATIENT)
Dept: FAMILY MEDICINE | Facility: CLINIC | Age: 52
End: 2022-07-11
Payer: COMMERCIAL

## 2022-07-11 DIAGNOSIS — Q61.3 POLYCYSTIC KIDNEY: Primary | ICD-10-CM

## 2022-07-11 PROCEDURE — 99421 OL DIG E/M SVC 5-10 MIN: CPT | Performed by: NURSE PRACTITIONER

## 2022-07-11 NOTE — LETTER
[address] [address]  Phone: [phone]    July 11, 2022        Sheri Joyce  [address]  [place] [address]          To whom it may concern:    RE: Sheri Joyce    Patient was seen and treated today at our clinic. Was of work related to her Polycystic Kidney disease .  Patient may return to work July 14  with the following:  No restrictions    Please contact me for questions or concerns.      Sincerely,        RYDER Green CNP

## 2022-07-11 NOTE — LETTER
[address] [address]  Phone: [phone]    July 11, 2022        Sheri Joyce  [address]  [place] [address]          To whom it may concern:    RE: Sheri Joyce    Patient was seen and treated today at our clinic.  Patient may return to work  with the following:  No restrictions    Please contact me for questions or concerns.      Sincerely,        RYDER Green CNP

## 2022-08-19 ENCOUNTER — DOCUMENTATION ONLY (OUTPATIENT)
Dept: LAB | Facility: CLINIC | Age: 52
End: 2022-08-19

## 2022-08-19 ENCOUNTER — LAB (OUTPATIENT)
Dept: LAB | Facility: CLINIC | Age: 52
End: 2022-08-19

## 2022-08-19 DIAGNOSIS — N18.30 CKD (CHRONIC KIDNEY DISEASE) STAGE 3, GFR 30-59 ML/MIN (H): Primary | ICD-10-CM

## 2022-08-19 LAB
HOLD SPECIMEN: NORMAL

## 2022-08-19 NOTE — PROGRESS NOTES
Sheri Joyce has an upcoming lab appointment:    Future Appointments   Date Time Provider Department Center   2022  1:00 PM Jayne Davis MD [facility]     Patient came in today, all of her orders  as of 2020. Please place new orders for the following  tests: UA, Parathyroid, Renal Panel and CBC. Extra tubes and urine were collected at today's visit.     There is no order available. Please review and place either future orders or HMPO (Review of Health Maintenance Protocol Orders), as appropriate.    Health Maintenance Due   Topic     ANNUAL REVIEW OF HM ORDERS      HEPATITIS C SCREENING      BMP      LIPID      TSH W/FREE T4 REFLEX      MICROALBUMIN      HEMOGLOBIN      Saadia Stanton

## 2022-08-22 NOTE — TELEPHONE ENCOUNTER
Left voicemail for patient offering a telephone or video visit for next week's 4/15 appointment with Dr. Davis. Advised to try and get labs done prior to appointment so results can be reviewed on 4/15 with Dr. Davis. Provided phone number to call us back so we can help facilitate the appointment change.    Brook Mays LPN     Writer replied. Dr. Wren received your message and states, some will experience this with taking naltrexone at night, trial AM dosing, and follow up for persistent symptoms.

## 2022-08-24 ENCOUNTER — VIRTUAL VISIT (OUTPATIENT)
Dept: NEPHROLOGY | Facility: CLINIC | Age: 52
End: 2022-08-24
Payer: COMMERCIAL

## 2022-08-24 VITALS — HEIGHT: 64 IN | BODY MASS INDEX: 28.44 KG/M2

## 2022-08-24 DIAGNOSIS — Q61.3 POLYCYSTIC KIDNEY: Primary | ICD-10-CM

## 2022-08-24 PROCEDURE — 99214 OFFICE O/P EST MOD 30 MIN: CPT | Mod: 95 | Performed by: INTERNAL MEDICINE

## 2022-08-24 ASSESSMENT — PAIN SCALES - GENERAL: PAINLEVEL: MILD PAIN (3)

## 2022-08-24 NOTE — PROGRESS NOTES
"How would you like to obtain your AVS? MyChart  If the video visit is dropped, the invitation should be resent by: Text to cell phone: [phone]  Will anyone else be joining your video visit? No   No BP taken        Video-Visit Details    Video Start Time: 1.02 pm    Type of service:  Video Visit    Video End Time: 1.12 pm    Originating Location (pt. Location): Home    Distant Location (provider location):  [facility]     Platform used for Video Visit: [facility]           Nephrology Clinic follow up  August 24, 2022      Sheri Joyce MRN:[id number] YOB: 1970  Date of Admission:(Not on file)  Primary care provider: Tianna Vieyra  Requesting physician: No att. providers found      REASON FOR CONSULT: ADPKD    HISTORY OF PRESENT ILLNESS:      PAST MEDICAL HISTORY:  Reviewed with patient on 08/24/2022   As per HPI    MEDICATIONS:  Reviewed with the patient in detail    ALLERGIES:    Reviewed with the patient in detail    REVIEW OF SYSTEMS:  A comprehensive of systems was negative except as noted above.    SOCIAL HISTORY:   Reviewed with patient, no smoking and no alcohol use     FAMILY MEDICAL HISTORY:   Reviewed, no family history of need for dialysis, transplant or CKD    PHYSICAL EXAM:   Vital signs:Ht 1.626 m (5' 4\")   BMI 28.44 kg/m          Sheri is a 52 year old who is being evaluated via a billable video visit.        CC: ADPKD     HPI: Sheri Joyce is a 49 year old female who presents for evaluation of ADPKD  She first found out about her kidney disease 25 years ago when she had an US done in the ER after presenting for abdominal pain. Her first pregnancy was before this and uneventful however her second pregnancy was complicated with hypertension and she was told to be on bed rest. She was asked to be on antihypertensives however she did not have insurance and had not been taking any antihypertensives. She started taking Lisinopril 6 months ago. " She does not check her blood pressures at home. She has had cyst rupture causing abdominal pain last October.   She reports that her daughter also has cysts in her kidneys. She denies any family history of kidney diseases particularly polycystic kidney disease.   Patient denies any LE edema, exertional dyspnea/orthopnea/PND, dizziness, lightheadedness, nausea, vomiting or diarrhea.     - History of Hematuria: no  - Swelling: no  - Hx of UTIs: no  - Hx of stones: no  - Rashes/Joint pain: no  - Family hx of kidney disease: no  - NSAID use: no      Interval history: No new symptoms that she has noticed. She is doing well overall and tries to drink a lot of water to keep herself hydrated. She did go to the lab and leave a sample however her labs have not been processed unfortunately.     Assessment/Plan:     # ADPKD  # CKD stage 3  # Hypertension    She has a baseline creatinine of 1.7-1.8 mg/dl. UA with protein, no hematuria. Urine P/Cr ratio of 0.54 g/g. She has had complications such as recurrent UTI's in the past, and cyst rupture. She has had deroofing procedure done in 2006. She has had MRI brain in 2005 which was negative for aneurysms.   She has had slow progression of her kidney disease over years and had a rise in creatinine at ine point with addition of Lisinopril. I eventually increased her lisinopril to 40 mg daily which she is tolerating well.   She does not have any recently documented blood pressures.   The last time, we had discussed tolvaptan but she was lost to follow up during the pandemic   She does not have other metabolic complications of CKD    I will order another CT scan with measurements to evaluate her kidney volume so that her candidacy for tolvaptan can be assessed. Also, will get labs done soon.     Jayne Davis MD

## 2022-08-26 ENCOUNTER — TELEPHONE (OUTPATIENT)
Dept: NEPHROLOGY | Facility: CLINIC | Age: 52
End: 2022-08-26

## 2022-08-26 NOTE — TELEPHONE ENCOUNTER
Attempted to contact pt.  No answer.  Message left to return call.    Calling to assist pt with scheduling Appt's recommended by Dr. Davis:    ~CT abdomen  ~InClinic Appointment with Dr. Davis on HOLD for pt 9/14/22 at 12:30pm.  ~Lab Appt prior.      (pt had left lab sample for Dr. Davis.  There was no Future or Standing Orders at the time. This LPN spoke with Lab Dept.  Pt will need to be drawn again).    Amara Ivory LPN

## 2022-08-29 NOTE — TELEPHONE ENCOUNTER
2nd attempt to contact pt. No answer. Message left to return call.    See message below to assist pt with scheduling appts recommended by Dr. Davis.    Amara Ivory LPN

## 2022-08-29 NOTE — TELEPHONE ENCOUNTER
Pt returned call.  Pt scheduled the CT of abd for 9/08/22.  Requested to repeat labs prior.  Lab appt scheduled for 9/08/22 at 8:30am.    Pt agreed to [facility]inic appt with Dr. Davis 9/14/22 at  12:30pm.  appt scheduled.  Encouraged to call or MyChart if any further questions or concerns.    Amara Ivory LPN

## 2022-09-08 ENCOUNTER — ANCILLARY PROCEDURE (OUTPATIENT)
Dept: CT IMAGING | Facility: CLINIC | Age: 52
End: 2022-09-08
Attending: INTERNAL MEDICINE
Payer: COMMERCIAL

## 2022-09-08 ENCOUNTER — LAB (OUTPATIENT)
Dept: LAB | Facility: CLINIC | Age: 52
End: 2022-09-08

## 2022-09-08 DIAGNOSIS — N18.30 CKD (CHRONIC KIDNEY DISEASE) STAGE 3, GFR 30-59 ML/MIN (H): ICD-10-CM

## 2022-09-08 DIAGNOSIS — Q61.3 POLYCYSTIC KIDNEY: ICD-10-CM

## 2022-09-08 LAB
ALBUMIN MFR UR ELPH: 44.3 MG/DL
ALBUMIN SERPL-MCNC: 3.6 G/DL (ref 3.4–5)
ANION GAP SERPL CALCULATED.3IONS-SCNC: 6 MMOL/L (ref 3–14)
BUN SERPL-MCNC: 33 MG/DL (ref 7–30)
CALCIUM SERPL-MCNC: 8.9 MG/DL (ref 8.5–10.1)
CHLORIDE BLD-SCNC: 108 MMOL/L (ref 94–109)
CO2 SERPL-SCNC: 22 MMOL/L (ref 20–32)
CREAT SERPL-MCNC: 3.35 MG/DL (ref 0.52–1.04)
CREAT UR-MCNC: 27 MG/DL
GFR SERPL CREATININE-BSD FRML MDRD: 16 ML/MIN/1.73M2
GLUCOSE BLD-MCNC: 108 MG/DL (ref 70–99)
HGB BLD-MCNC: 12.7 G/DL (ref 11.7–15.7)
PHOSPHATE SERPL-MCNC: 4.6 MG/DL (ref 2.5–4.5)
POTASSIUM BLD-SCNC: 4.8 MMOL/L (ref 3.4–5.3)
PROT/CREAT 24H UR: 1.64 MG/MG CR (ref 0–0.2)
PTH-INTACT SERPL-MCNC: 104 PG/ML (ref 15–65)
SODIUM SERPL-SCNC: 136 MMOL/L (ref 133–144)

## 2022-09-08 PROCEDURE — 76377 3D RENDER W/INTRP POSTPROCES: CPT | Mod: GC | Performed by: RADIOLOGY

## 2022-09-08 PROCEDURE — 36415 COLL VENOUS BLD VENIPUNCTURE: CPT

## 2022-09-08 PROCEDURE — 84156 ASSAY OF PROTEIN URINE: CPT

## 2022-09-08 PROCEDURE — 71250 CT THORAX DX C-: CPT | Mod: GC | Performed by: RADIOLOGY

## 2022-09-08 PROCEDURE — 74176 CT ABD & PELVIS W/O CONTRAST: CPT | Mod: GC | Performed by: RADIOLOGY

## 2022-09-08 PROCEDURE — 85018 HEMOGLOBIN: CPT

## 2022-09-08 PROCEDURE — 80069 RENAL FUNCTION PANEL: CPT

## 2022-09-08 PROCEDURE — 83970 ASSAY OF PARATHORMONE: CPT

## 2022-10-04 ENCOUNTER — TELEPHONE (OUTPATIENT)
Dept: NEPHROLOGY | Facility: CLINIC | Age: 52
End: 2022-10-04

## 2022-10-05 ENCOUNTER — MYC MEDICAL ADVICE (OUTPATIENT)
Dept: NEPHROLOGY | Facility: CLINIC | Age: 52
End: 2022-10-05

## 2022-10-05 DIAGNOSIS — N18.4 CKD (CHRONIC KIDNEY DISEASE) STAGE 4, GFR 15-29 ML/MIN (H): Primary | ICD-10-CM

## 2022-10-06 NOTE — TELEPHONE ENCOUNTER
Dr. Davis reviewed Sept labs. Pt had not had labs since 2020 and feels that this could be progression of ckd. Attempted to reach patient by telephone to discuss if any recent illnesses or medications. Left message for patient to discuss. Writer scheduled pt for sooner visit for next week. Patient should repeat lab work at that time. My chart message sent.     LORA Eugene

## 2022-10-09 ENCOUNTER — HEALTH MAINTENANCE LETTER (OUTPATIENT)
Age: 52
End: 2022-10-09

## 2022-10-12 ENCOUNTER — PATIENT OUTREACH (OUTPATIENT)
Dept: NEPHROLOGY | Facility: CLINIC | Age: 52
End: 2022-10-12

## 2022-10-12 ENCOUNTER — OFFICE VISIT (OUTPATIENT)
Dept: NEPHROLOGY | Facility: CLINIC | Age: 52
End: 2022-10-12
Payer: COMMERCIAL

## 2022-10-12 ENCOUNTER — LAB (OUTPATIENT)
Dept: LAB | Facility: CLINIC | Age: 52
End: 2022-10-12
Payer: COMMERCIAL

## 2022-10-12 VITALS
DIASTOLIC BLOOD PRESSURE: 106 MMHG | SYSTOLIC BLOOD PRESSURE: 171 MMHG | WEIGHT: 133.3 LBS | OXYGEN SATURATION: 98 % | HEART RATE: 72 BPM | BODY MASS INDEX: 22.88 KG/M2

## 2022-10-12 DIAGNOSIS — F17.210 CIGARETTE NICOTINE DEPENDENCE WITHOUT COMPLICATION: ICD-10-CM

## 2022-10-12 DIAGNOSIS — I12.9 RENAL HYPERTENSION: Primary | ICD-10-CM

## 2022-10-12 DIAGNOSIS — Q61.3 POLYCYSTIC KIDNEY: ICD-10-CM

## 2022-10-12 DIAGNOSIS — N18.4 CKD (CHRONIC KIDNEY DISEASE) STAGE 4, GFR 15-29 ML/MIN (H): ICD-10-CM

## 2022-10-12 LAB
ALBUMIN MFR UR ELPH: 47.8 MG/DL
ALBUMIN SERPL-MCNC: 3.6 G/DL (ref 3.4–5)
ALBUMIN UR-MCNC: 50 MG/DL
ANION GAP SERPL CALCULATED.3IONS-SCNC: 6 MMOL/L (ref 3–14)
APPEARANCE UR: CLEAR
BILIRUB UR QL STRIP: NEGATIVE
BUN SERPL-MCNC: 35 MG/DL (ref 7–30)
CALCIUM SERPL-MCNC: 8.8 MG/DL (ref 8.5–10.1)
CHLORIDE BLD-SCNC: 112 MMOL/L (ref 94–109)
CO2 SERPL-SCNC: 21 MMOL/L (ref 20–32)
COLOR UR AUTO: COLORLESS
CREAT SERPL-MCNC: 3.91 MG/DL (ref 0.52–1.04)
CREAT UR-MCNC: 22.4 MG/DL
ERYTHROCYTE [DISTWIDTH] IN BLOOD BY AUTOMATED COUNT: 13 % (ref 10–15)
FERRITIN SERPL-MCNC: 129 NG/ML (ref 8–252)
GFR SERPL CREATININE-BSD FRML MDRD: 13 ML/MIN/1.73M2
GLUCOSE BLD-MCNC: 125 MG/DL (ref 70–99)
GLUCOSE UR STRIP-MCNC: 30 MG/DL
HCT VFR BLD AUTO: 36.8 % (ref 35–47)
HGB BLD-MCNC: 12.3 G/DL (ref 11.7–15.7)
HGB UR QL STRIP: ABNORMAL
IRON SATN MFR SERPL: 27 % (ref 15–46)
IRON SERPL-MCNC: 59 UG/DL (ref 35–180)
KETONES UR STRIP-MCNC: NEGATIVE MG/DL
LEUKOCYTE ESTERASE UR QL STRIP: NEGATIVE
MCH RBC QN AUTO: 30.5 PG (ref 26.5–33)
MCHC RBC AUTO-ENTMCNC: 33.4 G/DL (ref 31.5–36.5)
MCV RBC AUTO: 91 FL (ref 78–100)
NITRATE UR QL: NEGATIVE
PH UR STRIP: 5.5 [PH] (ref 5–7)
PHOSPHATE SERPL-MCNC: 3.5 MG/DL (ref 2.5–4.5)
PLATELET # BLD AUTO: 228 10E3/UL (ref 150–450)
POTASSIUM BLD-SCNC: 4 MMOL/L (ref 3.4–5.3)
PROT/CREAT 24H UR: 2.13 MG/MG CR (ref 0–0.2)
PTH-INTACT SERPL-MCNC: 71 PG/ML (ref 15–65)
RBC # BLD AUTO: 4.03 10E6/UL (ref 3.8–5.2)
RBC #/AREA URNS AUTO: ABNORMAL /HPF
SKIP: ABNORMAL
SODIUM SERPL-SCNC: 139 MMOL/L (ref 133–144)
SP GR UR STRIP: 1 (ref 1–1.03)
SQUAMOUS #/AREA URNS AUTO: ABNORMAL /LPF
TIBC SERPL-MCNC: 215 UG/DL (ref 240–430)
UROBILINOGEN UR STRIP-MCNC: NORMAL MG/DL
WBC # BLD AUTO: 7.8 10E3/UL (ref 4–11)
WBC #/AREA URNS AUTO: ABNORMAL /HPF

## 2022-10-12 PROCEDURE — 83540 ASSAY OF IRON: CPT

## 2022-10-12 PROCEDURE — 82306 VITAMIN D 25 HYDROXY: CPT

## 2022-10-12 PROCEDURE — 84156 ASSAY OF PROTEIN URINE: CPT

## 2022-10-12 PROCEDURE — 82728 ASSAY OF FERRITIN: CPT

## 2022-10-12 PROCEDURE — 81001 URINALYSIS AUTO W/SCOPE: CPT

## 2022-10-12 PROCEDURE — 85027 COMPLETE CBC AUTOMATED: CPT

## 2022-10-12 PROCEDURE — 83550 IRON BINDING TEST: CPT

## 2022-10-12 PROCEDURE — 80069 RENAL FUNCTION PANEL: CPT

## 2022-10-12 PROCEDURE — 99214 OFFICE O/P EST MOD 30 MIN: CPT | Performed by: INTERNAL MEDICINE

## 2022-10-12 PROCEDURE — 36415 COLL VENOUS BLD VENIPUNCTURE: CPT

## 2022-10-12 PROCEDURE — 83970 ASSAY OF PARATHORMONE: CPT

## 2022-10-12 RX ORDER — AMLODIPINE BESYLATE 10 MG/1
10 TABLET ORAL DAILY
Qty: 90 TABLET | Refills: 3 | Status: SHIPPED | OUTPATIENT
Start: 2022-10-12 | End: 2023-11-07

## 2022-10-12 RX ORDER — VARENICLINE TARTRATE 1 MG/1
1 TABLET, FILM COATED ORAL 2 TIMES DAILY
Qty: 60 TABLET | Refills: 2 | Status: SHIPPED | OUTPATIENT
Start: 2022-10-12 | End: 2023-04-04

## 2022-10-12 ASSESSMENT — PAIN SCALES - GENERAL: PAINLEVEL: MILD PAIN (3)

## 2022-10-12 NOTE — NURSING NOTE
Huddled with Dr. Davis re: POC.     RN to coordinate the following:    - Tour/Meet &Greet with Jamila Norton PD nurse (Faxed face sheet and note to Jamila Norton attn: Alicia Chowdhury 10/12/22    - PD cath consult- message sent to Dialysis Access nursing team to help coordinate. (Staff message sent to Dialysis Access Coordinators)    - Kidney transplant referral (ordered)    -CKD journey ordered for neph tracking (updated)    - recheck lab in 2 weeks then decide what to do from there      Contacted patient regarding the above information. She asked a letter to support her time away from work. She stated that she will await calls from referrals and return those calls promptly.    Valorie Louis, RN, BSN  Nephrology Care Coordinator  [facility]

## 2022-10-12 NOTE — PROGRESS NOTES
Nephrology Clinic follow up  August 24, 2022      Sheri Joyce MRN:[id number] YOB: 1970  Date of Admission:(Not on file)  Primary care provider: Tianna Vieyra  Requesting physician: No att. providers found      REASON FOR CONSULT: ADPKD    HISTORY OF PRESENT ILLNESS:    HPI: Sheri Joyce is a 49 year old female who presents for evaluation of ADPKD  She first found out about her kidney disease 25 years ago when she had an US done in the ER after presenting for abdominal pain. Her first pregnancy was before this and uneventful however her second pregnancy was complicated with hypertension and she was told to be on bed rest. She was asked to be on antihypertensives however she did not have insurance and had not been taking any antihypertensives. She started taking Lisinopril 6 months ago. She does not check her blood pressures at home. She has had cyst rupture causing abdominal pain last October.   She reports that her daughter also has cysts in her kidneys. She denies any family history of kidney diseases particularly polycystic kidney disease.   Patient denies any LE edema, exertional dyspnea/orthopnea/PND, dizziness, lightheadedness, nausea, vomiting or diarrhea.     PAST MEDICAL HISTORY:  Reviewed with patient on 10/12/2022   As per HPI    MEDICATIONS:  Reviewed with the patient in detail    ALLERGIES:    Reviewed with the patient in detail    REVIEW OF SYSTEMS:  A comprehensive of systems was negative except as noted above.    SOCIAL HISTORY:   Reviewed with patient, no smoking and no alcohol use     FAMILY MEDICAL HISTORY:   Reviewed, no family history of need for dialysis, transplant or CKD    PHYSICAL EXAM:   Vital signs:BP (!) 171/106 (BP Location: Left arm, Patient Position: Sitting, Cuff Size: Adult Regular)   Pulse 72   Wt 60.5 kg (133 lb 4.8 oz)   SpO2 98%   BMI 22.88 kg/m       Gen: Appears well  Neck: No JVD  Lungs: CTA  CVS: S1S2 normal, no murmurs  heard  LE: no edema  Skin: no rashes  CNS: Grossly normal       Interval history: She has been feeling poorly for sometime now. She says she is really tired and that she does not really have an appetite. She denies any LE edema or shortness of breath. She has stopped taking her lisinopril for a long time.     Assessment/Plan:     # ADPKD  # CKD stage 3  # Hypertension    She has a significantly steep decline in her gfr over the last 2 years. Her eGFR is now 13.  UA with protein, no hematuria. Urine P/Cr ratio of 0.54 g/g. She has had complications such as recurrent UTI's in the past, and cyst rupture. She has had deroofing procedure done in 2006. She has had MRI brain in 2005 which was negative for aneurysms.   She was started on Lisinopril a couple of years ago but has stopped taking it for some time. She also has been significantly hypertensive. The last time, we had discussed tolvaptan but she was lost to follow up during the pandemic. She then missed another clinic apt, and we eventually obtained a CT abdomen to assess her tolvaptan candidacy. Her martha adjusted calculated total kidney volume is 1086 ml/m and she falls in class 1C. She would have been a good candidate for Tolvaptan however with her eGFR now being 13, would not have any real benefit. I do not see other reversible factors for her declining gfr.     As mentioned above, her eGFR declined from 36-->13 since 2020. I discussed that she will likely need to go on dialysis while we work her up for transplant. 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 She is interested in exploring PD.     --I will arrange for her to see a surgeon to place a dialysis catheter and arrange for PD education at the [facility]   --will arrange evaluation by transplant team   --will start amlodipine 10 mg daily for your blood pressures  --next blood work will be in 2 weeks and will determine what the future apt plan is       Jayne  Susan KING

## 2022-10-12 NOTE — PATIENT INSTRUCTIONS
--your kidney function is down down to 13 % and you would need blood work every 2 weeks  --I will arrange for you to see a surgeon to place a dialysis catheter and arrange for PD education at the [facility]   --will arrange for you to be evaluated by transplant team   --start taking amlodipine 10 mg daily for your blood pressures  --next blood work will be in 2 weeks and we can keep talking about future appointments

## 2022-10-12 NOTE — NURSING NOTE
Sheri Joyce's goals for this visit include: NONE  She requests these members of her care team be copied on today's visit information: YES    PCP: Tianna Vieyra    Referring Provider:  No referring provider defined for this encounter.    BP (!) 171/106 (BP Location: Left arm, Patient Position: Sitting, Cuff Size: Adult Regular)   Pulse 72   Wt 60.5 kg (133 lb 4.8 oz)   SpO2 98%   BMI 22.88 kg/m      Do you need any medication refills at today's visit? NONE    Felix Carpenter, EMT

## 2022-10-13 ENCOUNTER — REFERRAL (OUTPATIENT)
Dept: TRANSPLANT | Facility: CLINIC | Age: 52
End: 2022-10-13

## 2022-10-13 ENCOUNTER — TELEPHONE (OUTPATIENT)
Dept: SURGERY | Facility: CLINIC | Age: 52
End: 2022-10-13

## 2022-10-13 DIAGNOSIS — Z87.891 HISTORY OF TOBACCO USE: ICD-10-CM

## 2022-10-13 DIAGNOSIS — I10 ESSENTIAL HYPERTENSION: ICD-10-CM

## 2022-10-13 DIAGNOSIS — N18.30 CHRONIC RENAL FAILURE, STAGE 3 (MODERATE), UNSPECIFIED WHETHER STAGE 3A OR 3B CKD (H): ICD-10-CM

## 2022-10-13 DIAGNOSIS — Q61.3 POLYCYSTIC KIDNEY DISEASE: ICD-10-CM

## 2022-10-13 DIAGNOSIS — Z01.818 PRE-TRANSPLANT EVALUATION FOR KIDNEY TRANSPLANT: ICD-10-CM

## 2022-10-13 DIAGNOSIS — Z76.82 ORGAN TRANSPLANT CANDIDATE: ICD-10-CM

## 2022-10-13 LAB — DEPRECATED CALCIDIOL+CALCIFEROL SERPL-MC: 16 UG/L (ref 20–75)

## 2022-10-13 NOTE — LETTER
October 26, 2022      Sheri Joyce  [address]  [place] [address]      Dear Sheri,    Thank you for your interest in the Transplant Center at [facility]. We look forward to being a part of your care team and assisting you through the transplant process.    As we discussed, your transplant coordinator is Regine Roe (Kidney).  You may call your coordinator at any time with questions or concerns.  Your first scheduled call will be on November 1, 2022.  If this needs to change, call [phone].    Please complete the following.    1. Fill out and return the enclosed forms    Authorization for Electronic Communication    Authorization to Discuss Protected Health Information    Authorization for Release of Protected Health Information    2. Sign up for:    Simply [employer]t, access to your electronic medical record (see enclosed pamphlet)    Cass ArttransplantTagArray, a transplant education website    You can use these tools to learn more about your transplant, communicate with your care team, and track your medical details      Sincerely,      Solid Organ Transplant  [facility]    cc: Referring Physician PCP

## 2022-10-13 NOTE — TELEPHONE ENCOUNTER
[facility] Call Center    Phone Message    May a detailed message be left on voicemail: no     Reason for Call: Question regarding specialist protocol  Please follow protocols- only utilize this documentation for questions or concerns that are not clear in the protocol.  Contact clinic directly to clarify question(s) via phone or MiSiedo message.   Was Clinic Available: no  Question regarding protocol:  Please review and inform on how to schedule, dx not listed in the protos. Note states:[place]-CONSULT FOR PD CATHETER PLACEMENT FOR PERITONEAL DIALYSIS. MESSAGE SENT TO DIALYSIS ACCESS TEAM TO HELP SCHEDULE. Pt has not been contacted to schedule.  Is there a referral for the requested specialist/specialty? yes  Name of referring provider: Jayne Davis MD  Location of referring provider:  [facility]      Action Taken: Message routed to:  [facility] ([facility] General Surg    Travel Screening: Not Applicable

## 2022-10-13 NOTE — LETTER
[facility]  [address] [place] [address]                November 1, 2022    Ozzie Wade,     It was a pleasure to speak with you over the phone today. As we discussed, I am sending an email to review the pre transplant information we covered. I will also send this same content to you in a letter via your My Chart for your convenience.     A  from our Office will send you a schedule for your pre kidney transplant evaluation day on November 15, 2022 soon via your My Chart.  Once again this is a virtual evaluation day so you stay at your home for this. A nurse s aide from the clinic will call you on November 15, 2022 between 7:00 am and 8:00 am to confirm a good telephone/video connection. Once confirmed, she will inform the providers you are ready to be seen. You will then proceed with having a video appointment with a transplant surgeon and a transplant nephrologist. You will in addition have a phone call from a  and a nutritionist. These are the 4 standard appointments everyone needs to attend when in pre kidney transplant evaluation.  Upon completion of your appointments I will compile the outcomes and have your results reviewed at the Transplant Team Selection Committee on Wednesday, November 23, 2022.  I will call you within a few days after this meeting to inform you of the outcomes and to assist in making arrangements for completion of your evaluation. I will also send you a summary letter after our telephone conversation.     You will receive an email from Brianna in our Transplant Office a few days prior to your appointments. This email contains a Receipt of Information Consent and patient education materials. Please follow the directions and electronically sign the consent as well as try to read as much of the materials as possible prior to your appointments.      Please complete your pre kidney transplant education on My Transplant Place. This is an online website that  our Transplant Program uses to house a lot of patient education.  To find My Transplant Place you can just google search for it or click on this link : [email or web address].  Your first step is to register as a new user, then pick your settings: adult/kidney/English. Next notice the education is divided into 3 sections: pre transplant/transplant/post transplant. In the pre transplant section please view pre kidney eval parts 1 and 2.  Please complete these videos prior to your appointments as this will give you good background then to speak to the providers.     Additional transplant resources are as follows:   www.UNOS.org  UNOS, or United Network of Organ Sharing, is the national organization in our Country that maintains all of the organ wait lists as well as is responsible for the rules and regulations about organ allocation. I would recommend looking at the Transplant Living section as this area is created just for patients.   www.SRTR.org  SRTR, or the Scientific Registry for Transplant Recipients is a national data base that all Transplant Centers report their success and failure rates to for all organ transplant types performed at their Center. The results are public knowledge so it is interesting to see what goes on in transplant.     It is a good idea to start a notebook when you begin reviewing your transplant education to keep track of things you want to remember and to write down questions that you have. Do have your notebook available at the time of your evaluation so you can get your questions answered then and also to write down things you want to remember from the provider appointments.     Once you have attended your kidney transplant evaluation appointments, you can have live donors register online with our Program to initiate their evaluations at [email or web address]. The donor will receive a detailed email response back with information and next steps specific to their  situation. Donors can also call our Office and ask to speak with a live donor coordinator in the event of questions at [phone].     Please let me know of any questions or concerns!   Regine Roe, RN, BSN  Pre Kidney Pancreas Transplant Coordinator   [facility]  Solid Organ Transplant [address]  [address] [address]  [email or web address]   Office Number: [phone] Direct Number: [phone]   Fax Number: [phone]  Employed by [facility]     CC's: Tianna Vieyra CNP, Dr. Jayne Davis

## 2022-10-18 ENCOUNTER — PATIENT OUTREACH (OUTPATIENT)
Dept: NEPHROLOGY | Facility: CLINIC | Age: 52
End: 2022-10-18

## 2022-10-18 ENCOUNTER — TELEPHONE (OUTPATIENT)
Dept: TRANSPLANT | Facility: CLINIC | Age: 52
End: 2022-10-18

## 2022-10-18 ENCOUNTER — DOCUMENTATION ONLY (OUTPATIENT)
Dept: TRANSPLANT | Facility: CLINIC | Age: 52
End: 2022-10-18

## 2022-10-18 VITALS — WEIGHT: 133 LBS | HEIGHT: 64 IN | BODY MASS INDEX: 22.71 KG/M2

## 2022-10-18 NOTE — TELEPHONE ENCOUNTER
Dialysis Access Referral     Pt referred for Dialysis Access Surgical Consult.    Referring provider: Dr. Davis    Patient is ready for access surgery.    Patient prefers PD.    Patient IS a PD candidate.    Patient was referred for Transplant.     Called patient to schedule with Dr. Hagen . Scheduled 11/8/22.    Vein mapping was not ordered, will not need scheduling.     Neph Tracking has been updated.    Flakita Espinoza RN   Dialysis Access Care Coordinator  Phone: [phone]  Please use P_Dialysis_Access_Nurse pool for Epic InBasket communications to ensure timely response

## 2022-10-18 NOTE — TELEPHONE ENCOUNTER
Patient Call: General  Route to LPN    Reason for call: regards to message left yesterday.     Call back needed? Yes    Return Call Needed  Same as documented in contacts section  When to return call?: Same day: Route High Priority

## 2022-10-18 NOTE — TELEPHONE ENCOUNTER
PCP: Dr Tianna Vieyra MD  Referring Provider: Dr Jayne Davis MD  Referring Diagnosis: Polycystic Kidney Disease    GFR/Date: 13 (10/12/2022)    Is patient under the age of 65? Yes  Is patient diabetic? No  Is patient on insulin? No  Was patient offered a pancreas transplant referral? No    Is patient in a group home/assisted living? No  Does patient have a guardian? No    Referral intake process completed.  Patient is aware that after financial approval is received, medical records will be requested.   Patient confirmed for a callback from transplant coordinator on 11/1/2022. (within 2 weeks)  Tentative evaluation date 11/15/2022  (within 4 weeks) if appointment is virtual, does patient have capabilities of setting this up? Yes    Confirmed coordinator will discuss evaluation process in more detail at the time of their call.   Patient is aware of the need to arrange age appropriate cancer screening, vaccinations, and dental care.  Reminded patient to complete questionnaire, complete medical records release, and review packet prior to evaluation visit .    Assessed patient for special needs (ie-wheelchair, assistance, guardian, and ):  Yes  Patient has low hearing in both ears.  No hearing aids.    Patient instructed to call [phone] with questions.     Patient gave verbal consent during intake call to obtain medical records and documents outside of [facility]:  Yes

## 2022-10-25 ENCOUNTER — LAB (OUTPATIENT)
Dept: LAB | Facility: CLINIC | Age: 52
End: 2022-10-25
Payer: COMMERCIAL

## 2022-10-25 DIAGNOSIS — I12.9 RENAL HYPERTENSION: ICD-10-CM

## 2022-10-25 LAB
ALBUMIN SERPL-MCNC: 4 G/DL (ref 3.4–5)
ANION GAP SERPL CALCULATED.3IONS-SCNC: 5 MMOL/L (ref 3–14)
BUN SERPL-MCNC: 39 MG/DL (ref 7–30)
CALCIUM SERPL-MCNC: 9.3 MG/DL (ref 8.5–10.1)
CHLORIDE BLD-SCNC: 103 MMOL/L (ref 94–109)
CO2 SERPL-SCNC: 25 MMOL/L (ref 20–32)
CREAT SERPL-MCNC: 3.49 MG/DL (ref 0.52–1.04)
GFR SERPL CREATININE-BSD FRML MDRD: 15 ML/MIN/1.73M2
GLUCOSE BLD-MCNC: 102 MG/DL (ref 70–99)
PHOSPHATE SERPL-MCNC: 3.9 MG/DL (ref 2.5–4.5)
POTASSIUM BLD-SCNC: 3.5 MMOL/L (ref 3.4–5.3)
SODIUM SERPL-SCNC: 133 MMOL/L (ref 133–144)

## 2022-10-25 PROCEDURE — 36415 COLL VENOUS BLD VENIPUNCTURE: CPT

## 2022-10-25 PROCEDURE — 80069 RENAL FUNCTION PANEL: CPT

## 2022-11-01 ENCOUNTER — E-VISIT (OUTPATIENT)
Dept: FAMILY MEDICINE | Facility: CLINIC | Age: 52
End: 2022-11-01
Payer: COMMERCIAL

## 2022-11-01 DIAGNOSIS — Z12.31 ENCOUNTER FOR SCREENING MAMMOGRAM FOR BREAST CANCER: Primary | ICD-10-CM

## 2022-11-01 DIAGNOSIS — Z12.11 SCREEN FOR COLON CANCER: ICD-10-CM

## 2022-11-01 PROCEDURE — 99421 OL DIG E/M SVC 5-10 MIN: CPT | Performed by: NURSE PRACTITIONER

## 2022-11-01 NOTE — TELEPHONE ENCOUNTER
Reviewed chart for purpose of pre kidney transplant evaluation planning. Per Dr. Jayne Davis's note 10/12/2022 patient has CKD stage 3 due to ADPKD, was diagnosed with kidney disease about 25 years ago when she had an abdominal US done for presentation of abdominal pain. No mention of brain aneurysm, MR angio of head done 07/06/2005. Chest/ abd/ pelvic CT done 09/08/2022 confirmed diagnosis of polycystic kidney disease and liver cystic disease. Other medical conditions include hypertension. No mention of chronic heart or lung issues. Mammogram 08/15/2019 negative. PAP 06/27/2018 negative. No record of colonoscopy. Current smoker, occasional alcohol use, daily marijuana smoker for improving appetite. BMI about 23. All okay to proceed with pre kidney transplant evaluation.     Called patient today and reached JONATHAN HOLLINS asking her to return my call.     Contacted patient and introduced myself as their Transplant Coordinator, also introduced the role of the Transplant Coordinator in the transplant process.  Explained the purpose of this call including reviewing next steps and answering questions.  Pt confirmed she is interested in proceeding with kidney transplant evaluation. 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 Patient states she is healthy overall except for her kidney disease. Pt aware mammogram is due and will schedule. Pt stating she has never had a colonoscopy, will schedule this. Pt currently smoking, trying to quit. Occasional alcohol use. Smokes marijuana every day as it helps with a lot of things, sleep/ cystic kidney pain, appetite, etc.   Confirmed Referring Provider, Dialysis Center, and Primary Care Physician. Notified patient of the importance of continued communication with referring providers and primary care physicians.    Reviewed components of transplant  evaluation process including necessary appointments, tests, and procedures.    Answered questions for patient regarding evaluation, provided my name and contact information and requested they call with any additional questions.    Determined that patient would like additional information regarding transplant by:     Drop Down choices: Mail, Email, Iván, Phone Call   Encourage Iván   Pt confirmed she will attend her virtual PKE on 11/15/2022 saved date. I explained a  will send her schedule via My Chart. I explained she will receive an email from Brianna a few days prior to her eval which contains a Receipt of Info and patient educational materials - instructed to read/sign consent and read educational materials. Instructed pt on use of My Transplant Place and to view pre kidney eval parts 1 and 2. Instructed on use of www.unos.org and srtr.org. Patient expressed excellent understanding of all and was in good agreement with the plan.     Smart set orders into Epic for pre kidney evaluation.     Pre transplant patient education email/ letter sent.

## 2022-11-02 ENCOUNTER — TELEPHONE (OUTPATIENT)
Dept: FAMILY MEDICINE | Facility: CLINIC | Age: 52
End: 2022-11-02

## 2022-11-02 NOTE — TELEPHONE ENCOUNTER
Call attempted regaurding note below   If patient calls back please get her scheduled for a physical     Aleisha QUESADA Visit Facilitator   [facility]

## 2022-11-02 NOTE — TELEPHONE ENCOUNTER
pt calling because she needs to be seen before 11/15/22 for transplant. please follow up to make apt. shes also okay with anydays and time or location to be seen.

## 2022-11-02 NOTE — TELEPHONE ENCOUNTER
Provider E-Visit time total (minutes): 7    Can we help we help get her scheduled for a physical   OK to use a same day, NP or hospital follow up slot

## 2022-11-03 ENCOUNTER — TELEPHONE (OUTPATIENT)
Dept: GASTROENTEROLOGY | Facility: CLINIC | Age: 52
End: 2022-11-03

## 2022-11-03 NOTE — TELEPHONE ENCOUNTER
Please give appointment to this patient to see me in person for a physical exam.  The appointment has to be either this Friday or next week

## 2022-11-03 NOTE — TELEPHONE ENCOUNTER
I will not be back in office until the 10th   I would get her in with another provider   Possibly Dr Montoya as he is an internist but she needs soonest available

## 2022-11-03 NOTE — TELEPHONE ENCOUNTER
Screening Questions  BLUE  KIND OF PREP RED  LOCATION [review exclusion criteria] GREEN  SEDATION TYPE        Y Are you active on mychart?      Tianna Vieyra, RYDER CNP   Ordering/Referring Provider?        MEDICA What type of coverage do you have?      N Have you had a positive covid test in the last 90 days?     22.8 1. BMI  [BMI 40+ - review exclusion criteria]    Y  2. Are you able to give consent for your medical care? [IF NO,RN REVIEW]        N  3. Are you taking any prescription pain medications on a routine schedule?      NA  3a. EXTENDED PREP What kind of prescription?     N 4. Do you have any chemical dependencies such as alcohol, street drugs, or methadone?    N 5. Do you have any history of post-traumatic stress syndrome, severe anxiety or history of psychosis?      **If yes 3- 5 , please schedule with MAC sedation.**          IF YES TO ANY 6 - 10 - HOSPITAL SETTING ONLY.     N 6.   Do you need assistance transferring?     N 7.   Have you had a heart or lung transplant?    N 8.   Are you currently on dialysis?   N 9.   Do you use daily home oxygen?   N 10. Do you take nitroglycerin?   10a. NA If yes, how often?     11. [FEMALES]  N Are you currently pregnant?    11a. NA If yes, how many weeks? [ Greater than 12 weeks, OR NEEDED]    N 12. Do you have Pulmonary Hypertension? *NEED PAC APPT AT [facility]*     N 13. [review exclusion criteria]  Do you have any implantable devices in your body (pacemaker, defib, LVAD)?    N 14. In the past 6 months, have you had any heart related issues including cardiomyopathy or heart attack?     14a. NA If yes, did it require cardiac stenting if so when?     N 15. Have you had a stroke or Transient ischemic attack (TIA - aka  mini stroke ) within 6 months?      N 16. Do you have mod to severe Obstructive Sleep Apnea?  [Hospital only - Ok at [place]]    N 17. Do you have SEVERE AND UNCONTROLLED asthma? *NEED PAC APPT AT [facility]*     N 18. Are you currently taking any  "blood thinners?     18a. If yes, inform patient to \"follow up w/ ordering provider for bridging instructions.\"    N 19. Do you take the medication Phentermine?    19a. If yes, \"Hold for 7 days before procedure.  Please consult your prescribing provider if you have questions about holding this medication.\"     Y  20. Do you have chronic kidney disease?      N  21. Do you have a diagnosis of diabetes?     N  22. On a regular basis do you go 3-5 days between bowel movements?      23. Preferred LOCAL Pharmacy for Pre Prescription    [ LIST ONLY ONE PHARMACY]       [id number] - [place], [place] - [address] [facility]    - CLOSING REMINDERS -    Informed patient they will need an adult    Cannot take any type of public or medical transportation alone    Conscious Sedation- Needs  for 6 hours after the procedure       MAC/General-Needs  for 24 hours after procedure    Pre-Procedure Covid test to be completed [[facility] PCR Testing Required]    Confirmed Nurse will call to complete assessment             Additional comments:  Will have transplant coordinator schedule appointment.           "

## 2022-11-08 ENCOUNTER — OFFICE VISIT (OUTPATIENT)
Dept: TRANSPLANT | Facility: CLINIC | Age: 52
End: 2022-11-08
Attending: SURGERY
Payer: COMMERCIAL

## 2022-11-08 VITALS
OXYGEN SATURATION: 98 % | DIASTOLIC BLOOD PRESSURE: 87 MMHG | SYSTOLIC BLOOD PRESSURE: 144 MMHG | BODY MASS INDEX: 22.67 KG/M2 | HEART RATE: 74 BPM | WEIGHT: 132.1 LBS

## 2022-11-08 DIAGNOSIS — N18.30 CHRONIC RENAL FAILURE, STAGE 3 (MODERATE), UNSPECIFIED WHETHER STAGE 3A OR 3B CKD (H): Primary | ICD-10-CM

## 2022-11-08 PROCEDURE — 99204 OFFICE O/P NEW MOD 45 MIN: CPT | Performed by: SURGERY

## 2022-11-08 RX ORDER — SENNOSIDES 8.6 MG
1 TABLET ORAL DAILY PRN
Status: ON HOLD | COMMUNITY
End: 2024-03-21

## 2022-11-08 NOTE — LETTER
11/8/2022         RE: Sheri Joyce  [address]  [place] [address]        Dear Colleague,    Thank you for referring your patient, Sheri Joyce, to the [facility]. Please see a copy of my visit note below.    Dialysis Access Service  Consult Note    Referred by Dr. Davis for placement of peritoneal dialysis access.    HPI: Ms. Joyce is being seen today for placement of peritoneal dialysis access due to Chronic renal failure from polycystic kidney disease (PKD).  She is left handed. Ms. Joyce is not dialyzing at (Not currently on dialysis).        Risk factors for complications of PD catheter access are:         Yes No  Hx of abdominal surgery  [x]   []    Comment: unroofing of cyst on kidney       Hx of  hernia repair/hydrocele []         [x]  Comment:  History of previous PD catheter []     [x]  Comment:     Respiratory problems   []     [x]  Comment:   Obesity (BMI >30)  []     [x]  Comment:  Diabetes    []        [x]  Comment:  Anticoagulation:   []         [x]   Agent:                                      Current immunosuppression []     [x]  Comment:         Past Medical History:   Diagnosis Date     Chronic kidney disease      Contraception 03/19/2009     Hypertension      Liver disease      Polycystic kidney, unspecified type      Thyroid disease      Varicosities        Past Surgical History:   Procedure Laterality Date     LAPAROSCOPIC DECORTICATION CYST RENAL  01/01/2006     MYRINGOTOMY, INSERT TUBE(S), ADENOIDECTOMY, COMBINED       PE TUBES  age 13     SURGICAL HISTORY OF -   11/01/2005    kidney surgery for cyst removal       Family History   Problem Relation Age of Onset     Cancer Mother         lung     Diabetes No family hx of      C.A.D. No family hx of      Hypertension No family hx of      Cerebrovascular Disease No family hx of      Breast Cancer No family hx of      Cancer - colorectal No family hx of      Prostate Cancer No family hx of         Social History     Tobacco Use     Smoking status: Former     Packs/day: 0.50     Years: 10.00     Pack years: 5.00     Types: Cigarettes     Quit date: 10/17/2022     Years since quittin.0     Smokeless tobacco: Never   Substance Use Topics     Alcohol use: Yes     Comment: Very occasionally         ROS: 10 point ROS neg other than the symptoms noted above in the HPI.                 Current Outpatient Medications:      amLODIPine (NORVASC) 10 MG tablet, Take 1 tablet (10 mg) by mouth daily, Disp: 90 tablet, Rfl: 3     sennosides (SENOKOT) 8.6 MG tablet, Take 1 tablet by mouth daily as needed for constipation, Disp: , Rfl:      varenicline (CHANTIX) 1 MG tablet, Take 1 tablet (1 mg) by mouth 2 times daily, Disp: 60 tablet, Rfl: 2     cyclobenzaprine (FLEXERIL) 10 MG tablet, Take 1 tablet (10 mg) by mouth every evening as needed (Patient not taking: Reported on 10/12/2022), Disp: 30 tablet, Rfl: 3     lisinopril (ZESTRIL) 40 MG tablet, Take 1 tablet (40 mg) by mouth daily (Patient not taking: Reported on 10/12/2022), Disp: 90 tablet, Rfl: 1                  PHYSICAL EXAM:  Pulse:  [74] 74  BP: (144)/(87) 144/87  SpO2:  [98 %] 98 %  Constitutional: Alert and oriented in no acute distress  HEENT: sclera anicteric, MMM, conjunctiva pink  Chest: HD catheter absent.  CV:  NSR  : No CVA tenderness  Abdomen: old incision ascites absent. No hernias  Beltline location in relation to umbilicus: below  Extremities:  No edema  Skin:  No rashes or jaundice  Neuro: normal gait  Psych: normal mood and affect    Lab on 10/25/2022   Component Date Value Ref Range Status     Sodium 10/25/2022 133  133 - 144 mmol/L Final     Potassium 10/25/2022 3.5  3.4 - 5.3 mmol/L Final     Chloride 10/25/2022 103  94 - 109 mmol/L Final     Carbon Dioxide (CO2) 10/25/2022 25  20 - 32 mmol/L Final     Anion Gap 10/25/2022 5  3 - 14 mmol/L Final     Urea Nitrogen 10/25/2022 39 (H)  7 - 30 mg/dL Final     Creatinine 10/25/2022 3.49 (H)   0.52 - 1.04 mg/dL Final     Calcium 10/25/2022 9.3  8.5 - 10.1 mg/dL Final     Glucose 10/25/2022 102 (H)  70 - 99 mg/dL Final     Albumin 10/25/2022 4.0  3.4 - 5.0 g/dL Final     Phosphorus 10/25/2022 3.9  2.5 - 4.5 mg/dL Final     GFR Estimate 10/25/2022 15 (L)  >60 mL/min/1.73m2 Final    Effective December 21, 2021 eGFRcr in adults is calculated using the 2021 CKD-EPI creatinine equation which includes age and gender (Danny et al., NEJ, DOI: 10.1056/BLWPqu6906739)            Assessment & Plan: Ms. Joyce is a excellent candidate for peritoneal  dialysis access.  I would recommend laparoscopic PD catheter placement with right sided exit, created under General. May want extended catheter.     The surgical risks and benefits were reviewed and questions were answered. We discussed the day of surgery plan, anesthesia, postop care, risk of infection, bleeding, thrombosis, possible need for intraoperative omentectomy or newly detected hernia repair or obliteration of patent processus vaginalis (if male), drain pain or catheter dysfunction, and possible future need for surgical revision or removal. This was contrasted with morbidity and mortality risk of long-term catheter based hemodialysis access. The patient does wish to proceed with surgery for permanent access creation at this time. The patient was counselled to contact our nurse coordinator, Glo Phan RN at [phone] with any questions or concerns.  Thank you for the opportunity to participate in Ms. Joyce's care.        Juanita Hagen MD FACS  Assistant Professor of Surgery  Director, [facility].

## 2022-11-08 NOTE — PROGRESS NOTES
Dialysis Access Service  Consult Note    Referred by Dr. Davis for placement of peritoneal dialysis access.    HPI: Ms. Joyce is being seen today for placement of peritoneal dialysis access due to Chronic renal failure from polycystic kidney disease (PKD).  She is left handed. Ms. Joyce is not dialyzing at (Not currently on dialysis).        Risk factors for complications of PD catheter access are:         Yes No  Hx of abdominal surgery  [x]   []    Comment: unroofing of cyst on kidney       Hx of  hernia repair/hydrocele []         [x]  Comment:  History of previous PD catheter []     [x]  Comment:     Respiratory problems   []     [x]  Comment:   Obesity (BMI >30)  []     [x]  Comment:  Diabetes    []        [x]  Comment:  Anticoagulation:   []         [x]   Agent:                                      Current immunosuppression []     [x]  Comment:         Past Medical History:   Diagnosis Date     Chronic kidney disease      Contraception 2009     Hypertension      Liver disease      Polycystic kidney, unspecified type      Thyroid disease      Varicosities        Past Surgical History:   Procedure Laterality Date     LAPAROSCOPIC DECORTICATION CYST RENAL  2006     MYRINGOTOMY, INSERT TUBE(S), ADENOIDECTOMY, COMBINED       PE TUBES  age 13     SURGICAL HISTORY OF -   2005    kidney surgery for cyst removal       Family History   Problem Relation Age of Onset     Cancer Mother         lung     Diabetes No family hx of      C.A.D. No family hx of      Hypertension No family hx of      Cerebrovascular Disease No family hx of      Breast Cancer No family hx of      Cancer - colorectal No family hx of      Prostate Cancer No family hx of        Social History     Tobacco Use     Smoking status: Former     Packs/day: 0.50     Years: 10.00     Pack years: 5.00     Types: Cigarettes     Quit date: 10/17/2022     Years since quittin.0     Smokeless tobacco: Never   Substance Use Topics     Alcohol  use: Yes     Comment: Very occasionally         ROS: 10 point ROS neg other than the symptoms noted above in the HPI.                 Current Outpatient Medications:      amLODIPine (NORVASC) 10 MG tablet, Take 1 tablet (10 mg) by mouth daily, Disp: 90 tablet, Rfl: 3     sennosides (SENOKOT) 8.6 MG tablet, Take 1 tablet by mouth daily as needed for constipation, Disp: , Rfl:      varenicline (CHANTIX) 1 MG tablet, Take 1 tablet (1 mg) by mouth 2 times daily, Disp: 60 tablet, Rfl: 2     cyclobenzaprine (FLEXERIL) 10 MG tablet, Take 1 tablet (10 mg) by mouth every evening as needed (Patient not taking: Reported on 10/12/2022), Disp: 30 tablet, Rfl: 3     lisinopril (ZESTRIL) 40 MG tablet, Take 1 tablet (40 mg) by mouth daily (Patient not taking: Reported on 10/12/2022), Disp: 90 tablet, Rfl: 1                  PHYSICAL EXAM:  Pulse:  [74] 74  BP: (144)/(87) 144/87  SpO2:  [98 %] 98 %  Constitutional: Alert and oriented in no acute distress  HEENT: sclera anicteric, MMM, conjunctiva pink  Chest: HD catheter absent.  CV:  NSR  : No CVA tenderness  Abdomen: old incision ascites absent. No hernias  Beltline location in relation to umbilicus: below  Extremities:  No edema  Skin:  No rashes or jaundice  Neuro: normal gait  Psych: normal mood and affect    Lab on 10/25/2022   Component Date Value Ref Range Status     Sodium 10/25/2022 133  133 - 144 mmol/L Final     Potassium 10/25/2022 3.5  3.4 - 5.3 mmol/L Final     Chloride 10/25/2022 103  94 - 109 mmol/L Final     Carbon Dioxide (CO2) 10/25/2022 25  20 - 32 mmol/L Final     Anion Gap 10/25/2022 5  3 - 14 mmol/L Final     Urea Nitrogen 10/25/2022 39 (H)  7 - 30 mg/dL Final     Creatinine 10/25/2022 3.49 (H)  0.52 - 1.04 mg/dL Final     Calcium 10/25/2022 9.3  8.5 - 10.1 mg/dL Final     Glucose 10/25/2022 102 (H)  70 - 99 mg/dL Final     Albumin 10/25/2022 4.0  3.4 - 5.0 g/dL Final     Phosphorus 10/25/2022 3.9  2.5 - 4.5 mg/dL Final     GFR Estimate 10/25/2022 15 (L)   >60 mL/min/1.73m2 Final    Effective December 21, 2021 eGFRcr in adults is calculated using the 2021 CKD-EPI creatinine equation which includes age and gender (Danny et al., NEJ, DOI: 10.1056/AOOCey5915541)            Assessment & Plan: Ms. Joyce is a excellent candidate for peritoneal  dialysis access.  I would recommend laparoscopic PD catheter placement with right sided exit, created under General. May want extended catheter.     The surgical risks and benefits were reviewed and questions were answered. We discussed the day of surgery plan, anesthesia, postop care, risk of infection, bleeding, thrombosis, possible need for intraoperative omentectomy or newly detected hernia repair or obliteration of patent processus vaginalis (if male), drain pain or catheter dysfunction, and possible future need for surgical revision or removal. This was contrasted with morbidity and mortality risk of long-term catheter based hemodialysis access. The patient does wish to proceed with surgery for permanent access creation at this time. The patient was counselled to contact our nurse coordinator, Glo Phan RN at [phone] with any questions or concerns.  Thank you for the opportunity to participate in Ms. Joyce's care.        Juanita Hagen MD FACS  Assistant Professor of Surgery  Director, [facility].

## 2022-11-08 NOTE — NURSING NOTE
Patient presents to clinic for PD catheter consult. She is CKD 5 followed by Dr. Davis. Patient has toured a PD unit and considers herself well-informed regarding dialysis. She is not interested in hemodialysis but had helped her friend do home hemodialysis in the past.    Reviewed the basic process of PD. Patient understands the difference between manual and cycler PD. Patient has space for necessary supplies.    Discussed placement of PD catheter and potential exit sites. Discussed timeline of surgery, when the PD catheter can be used, PD training, and follow up appointment.    Reviewed risks and benefits of PD. Discussed potential complications including occlusion, infection, and displacement. Explained the need for a bowel regimen to prevent constipation that could cause displacement of PD catheter.     Plan for PD catheter placement under general anesthesia. Timing of surgery to be determined by nephrology team.    Flakita Espinoza RN  Dialysis Access Care Coordinator - Nephrology  Phone: [phone]  Pool: Dialysis Access Nurse

## 2022-11-14 NOTE — PROGRESS NOTES
Pt evaluated via billable telephone visit d/t COVID-19 restrictions. Time spent: 15 min  Provider location: onsite ([facility] [place])     [facility] Solid Organ Transplant  Outpatient MNT: Kidney Transplant Evaluation    Current BMI: 22.7 (HT 64 in,  lbs/60 kg)- data from 11/8  BMI is within recommendation of <35 for kidney transplant     Time Spent: 15 minutes  Visit Type: Initial   Referring Physician: Ronald  Pt accompanied by: self     History of previous txp: none   Dialysis: no     Nutrition Assessment  Pt reports changing her diet a few weeks ago away from processed foods to a renal diet (low K, low Na, low Phos diet). She has cut out red meat and most protein is chicken breast/pork. She reports feeling better overall as a result of diet changes.     - Appetite: doesn't have an appetite but still eats (baseline, per pt)  - Food allergies/intolerances: none   - Meal prep & grocery shopping: pt does   - Issues chewing or swallowing: no   - N/V/D/C: some constipation- takes stool softener & laxative; doesn't fully help pt's constipation  - Food access concerns: not asked     Vitamins, Supplements, Pertinent Meds: none   Herbal Medicines/Supplements: none     Edema: none     Weight hx: fluctuating b/t 130-160 lbs at baseline     Diet Recall  Breakfast Switched from PB toast/banana-->frozen egg bites w/ veggies; Somali toast w/ frozen fruit/almond milk compote   Lunch Snacks on popcorn, roll ups with flour tortilla and veggie mix   Dinner Fajitas w/ chicken, veggies + homemade salsa    Snacks Popcorn, roasted cauliflower with balsamic vinegar    Beverages Prev drinking a lot of coffee, now only decaf/half caf, ravindra edwards, apple cider, water   Alcohol None   Dining out Prev a lot of Door Dash, but recently has done none x 1 month      Physical Activity  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  Reviewed wnl K/Phos levels. Pt wondering about phosphorus in bread. Will likely contain some (noted under ingredient list), yet would not worry too much about this due to wnl levels and changes she has made thus far.     Reviewed post txp diet guidelines in brief (will review in further detail post txp):  (1) Review of proper food safety measures d/t immunosuppressant therapy post-op and increased risk for food-borne illness    (2) Avoid the following post txp d/t risk for rejection, unknown effects on the organs, and/or potential interactions with immunosuppressants:  - Herbal, Chinese, holistic, chiropractic, natural, alternative medicines and supplements  - Detoxes and cleanses  - Weight loss pills  - Protein powders or other products with extracts or herbs (ie green tea extract)    (3) Med regimen and possible side effects    Patient Understanding: Pt verbalized understanding of education provided.  Expected Engagement: Good  Follow-Up Plans: PRN     Nutrition Goals  No nutrition goals identified at this time     Zaynab Jimenez, RD, LD, CCTD

## 2022-11-15 ENCOUNTER — VIRTUAL VISIT (OUTPATIENT)
Dept: TRANSPLANT | Facility: CLINIC | Age: 52
End: 2022-11-15
Attending: NURSE PRACTITIONER
Payer: COMMERCIAL

## 2022-11-15 DIAGNOSIS — Z87.891 HISTORY OF TOBACCO USE: ICD-10-CM

## 2022-11-15 DIAGNOSIS — Z76.82 ORGAN TRANSPLANT CANDIDATE: ICD-10-CM

## 2022-11-15 DIAGNOSIS — Q61.3 POLYCYSTIC KIDNEY DISEASE: ICD-10-CM

## 2022-11-15 DIAGNOSIS — Z87.891 FORMER TOBACCO USE: ICD-10-CM

## 2022-11-15 DIAGNOSIS — Z01.818 PRE-TRANSPLANT EVALUATION FOR KIDNEY TRANSPLANT: ICD-10-CM

## 2022-11-15 DIAGNOSIS — I10 ESSENTIAL HYPERTENSION: ICD-10-CM

## 2022-11-15 DIAGNOSIS — F41.9 ANXIETY: ICD-10-CM

## 2022-11-15 DIAGNOSIS — N18.5 CHRONIC KIDNEY DISEASE, STAGE 5 (H): Primary | ICD-10-CM

## 2022-11-15 DIAGNOSIS — N18.30 CHRONIC RENAL FAILURE, STAGE 3 (MODERATE), UNSPECIFIED WHETHER STAGE 3A OR 3B CKD (H): ICD-10-CM

## 2022-11-15 PROBLEM — F32.A DEPRESSION: Status: ACTIVE | Noted: 2022-11-15

## 2022-11-15 PROCEDURE — 99207 PR NO CHARGE COORDINATED CARE PS: CPT

## 2022-11-15 PROCEDURE — 99205 OFFICE O/P NEW HI 60 MIN: CPT | Mod: 95

## 2022-11-15 PROCEDURE — 99215 OFFICE O/P EST HI 40 MIN: CPT | Mod: 95

## 2022-11-15 NOTE — LETTER
11/15/2022         RE: Sheri Joyce  [address] [place] [address]        Dear Colleague,    Thank you for referring your patient, Sheri Joyce, to the [facility]. Please see a copy of my visit note below.    Pt evaluated via billable telephone visit d/t COVID-19 restrictions. Time spent: 15 min  Provider location: onsite ([facility] [place])     [facility] Solid Organ Transplant  Outpatient MNT: Kidney Transplant Evaluation    Current BMI: 22.7 (HT 64 in,  lbs/60 kg)- data from 11/8  BMI is within recommendation of <35 for kidney transplant     Time Spent: 15 minutes  Visit Type: Initial   Referring Physician: Ronald  Pt accompanied by: self     History of previous txp: none   Dialysis: no     Nutrition Assessment  Pt reports changing her diet a few weeks ago away from processed foods to a renal diet (low K, low Na, low Phos diet). She has cut out red meat and most protein is chicken breast/pork. She reports feeling better overall as a result of diet changes.     - Appetite: doesn't have an appetite but still eats (baseline, per pt)  - Food allergies/intolerances: none   - Meal prep & grocery shopping: pt does   - Issues chewing or swallowing: no   - N/V/D/C: some constipation- takes stool softener & laxative; doesn't fully help pt's constipation  - Food access concerns: not asked     Vitamins, Supplements, Pertinent Meds: none   Herbal Medicines/Supplements: none     Edema: none     Weight hx: fluctuating b/t 130-160 lbs at baseline     Diet Recall  Breakfast Switched from PB toast/banana-->frozen egg bites w/ veggies; Welsh toast w/ frozen fruit/almond milk compote   Lunch Snacks on popcorn, roll ups with flour tortilla and veggie mix   Dinner Fajitas w/ chicken, veggies + homemade salsa    Snacks Popcorn, roasted cauliflower with balsamic vinegar    Beverages Prev drinking a lot of coffee, now only decaf/half caf, ravindra edwards, apple cider, water   Alcohol None    Dining out Prev a lot of Door Dash, but recently has done none x 1 month      Physical Activity  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  Reviewed wnl K/Phos levels. Pt wondering about phosphorus in bread. Will likely contain some (noted under ingredient list), yet would not worry too much about this due to wnl levels and changes she has made thus far.     Reviewed post txp diet guidelines in brief (will review in further detail post txp):  (1) Review of proper food safety measures d/t immunosuppressant therapy post-op and increased risk for food-borne illness    (2) Avoid the following post txp d/t risk for rejection, unknown effects on the organs, and/or potential interactions with immunosuppressants:  - Herbal, Chinese, holistic, chiropractic, natural, alternative medicines and supplements  - Detoxes and cleanses  - Weight loss pills  - Protein powders or other products with extracts or herbs (ie green tea extract)    (3) Med regimen and possible side effects    Patient Understanding: Pt verbalized understanding of education provided.  Expected Engagement: Good  Follow-Up Plans: PRN     Nutrition Goals  No nutrition goals identified at this time     Zaynab Jimenez, RD, LD, CCTD

## 2022-11-15 NOTE — LETTER
11/15/2022         RE: Sheri Joyce  [address]  [facility] [address]        Dear Colleague,    Thank you for referring your patient, Sheri Joyce, to the [facility] TRANSPLANT [facility]. Please see a copy of my visit note below.    Psychosocial Assessment For Kidney Transplantation  Patient Name/ Age: Sheri Joyce 52 year old   Medical Record #: [id number]  Duration of Interview:     30 min  Process:   Face-to-Face Interview                (counseling < 50%)   Present at Appointment: Sheri- Magalys dominique         : MAGEN Lees  Date:  November 15, 2022        Type of transplant: Kidney     Donor type:      relative and friend   Prior Transplants:    No Status of Transplant:           Current Living Situation    Location:   [address] [place] [address] [facility] [address]  With Whom: lives with their son       Family/ Social Support:    Sheri lives with Bright- 11 year old son in [place], [place]. She has one daughter named Digna (age 27- out of state). She is  but considers her former spouse a a part of her support system. She speaks with her father frequently and is not in much contact with her brothers.     Her caregiver plan is unclear- reports her daughter will be able to fly in to assist, if needed.  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     Income   Salary/wages   Insurance  [employer] through employer     At this time, patient can afford medication costs:  Yes      Reports financial stressors with transplant as she has been out of work due to current illness and new  "diagnosis. Private Insurance       Medical Status    Current Mode of Treatment for ESRD Dialysis- starts soon, has completed PD training    Complications None       Behavioral    Tobacco Use No Chemical Dependency No   Reports that she quit tobacco use last month and used daily for a six month period prior to quitting. Sheri feels that she uses more tobacco when she is in a stressful state. She has no tried to use any tobacco in over 30 days.  Sheri reports she does not use any alcohol- no past concerns with substance abuse. She notes that she uses marijuana 1-2/daily for insomnia. She does not have a medical marijuana card but is interested in starting this discussion with her PCP.   Psychiatric Impairment Yes   Sheri notes some general anxiety/depression due to her new diagnosis. She notes she has not had any concerns with symptoms in the past. She also has noticed differences in her thinking patterns recently with how she reads/explains information. She notes feeling \"overall lost as if my brain is not connecting the words with what I intend to say.\" SW provided discussion and validation around this topic. She notes this is a new change since becoming diagnosed with ESRD>    Reading Ability: Good  Education Level: Some College Recent Legal History No      Coping Style/Strategies: cooking and being with loved ones        Ability to Adhere to Complex Medical Regime: Yes     Adherence History:        Education  _X_ Medicare  _X_ Rehabilitation  _X_ Donor issues  _X_ Community resources  _X_ Post discharge housing  _X_ Financial resources  _X_ Medical insurance options  _X_ Psych adjustment  _X_ Family adjustment  _X_ Health Care Directive Provided Education   Psychosocial Risks of Transplant Reviewed and Discussed:  _X_ Increased stress related to emotional,            family, social, employment or financial           situation  _X_ Effect on work and/or disability benefits  _X_ Effect on future health and life " "          insurance  _X_ Transplant outcome expectations may           not be met  _X_ Mental Health Risks: anxiety,           depression, PTSD, guilt, grief and           chronic fatigue     Notable Items:   None noted.       Final Evaluation/Assessment   Patient seemed to process information well. Appeared well informed, motivated and able to follow post transplant requirements. Behavior was appropriate during interview. Has adequate income and insurance coverage. Adequate social support. No major contraindications noted for transplant.  At this time patient appears to understand the risks and benefits of transplant.      Recommendation  Conditional    1. Finances: there are some general concerns with finances. She has been on an LESIA with intentions of returning to work but worries that this will \"not happen as soon as she is able.\"  provided county resources to obtain in the meanwhile. She may benefit from emergency cash assistance/general assistance. Pending working hours- she may be eligible for SSDI once dialysis begins.     2. Clarify Caregiver plan: Sheri did not have a designated person in mind but notes she will have an appropriate caregiver for transplant.     3. Neuropsych: Sheri highlighted concerns that she has noticed in recent months that are inhibiting her from comprehending information to her greatest ability. She notes that \"it is hard to explain but my brain is not able to process information in the way it has in the past which is making things very difficult right now.\"      Selection Criteria Met:  Plan for support No  Chemical Dependence Yes   Smoking Yes   Mental Health Yes   Adequate Finances No   Signature: Dara Bradshaw [facility]   Title: Clinical        Again, thank you for allowing me to participate in the care of your patient.        Sincerely,        No name on file    "

## 2022-11-15 NOTE — PROGRESS NOTES
Psychosocial Assessment For Kidney Transplantation  Patient Name/ Age: Sheri Joyce 52 year old   Medical Record #: [id number]  Duration of Interview:     30 min  Process:   Face-to-Face Interview                (counseling < 50%)   Present at Appointment: Sheri- Magalys dominique         : MAGEN Lees  Date:  November 15, 2022        Type of transplant: Kidney     Donor type:      relative and friend   Prior Transplants:    No Status of Transplant:           Current Living Situation    Location:   [address] [place] [address]  With Whom: lives with their son       Family/ Social Support:    Sheri lives with Bright- 11 year old son in [place], [place]. She has one daughter named Digna (age 27- out of state). She is  but considers her former spouse a a part of her support system. She speaks with her father frequently and is not in much contact with her brothers.     Her caregiver plan is unclear- reports her daughter will be able to fly in to assist, if needed.  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     Income   Salary/wages   Insurance  [employer] through employer     At this time, patient can afford medication costs:  Yes      Reports financial stressors with transplant as she has been out of work due to current illness and new diagnosis. Private Insurance       Medical Status    Current Mode of Treatment for ESRD Dialysis- starts soon, has completed PD training    Complications None       Behavioral    Tobacco Use No Chemical Dependency No   Reports that she quit tobacco use last  "month and used daily for a six month period prior to quitting. Sheri feels that she uses more tobacco when she is in a stressful state. She has no tried to use any tobacco in over 30 days.  Sehri reports she does not use any alcohol- no past concerns with substance abuse. She notes that she uses marijuana 1-2/daily for insomnia. She does not have a medical marijuana card but is interested in starting this discussion with her PCP.   Psychiatric Impairment Yes   Sheri notes some general anxiety/depression due to her new diagnosis. She notes she has not had any concerns with symptoms in the past. She also has noticed differences in her thinking patterns recently with how she reads/explains information. She notes feeling \"overall lost as if my brain is not connecting the words with what I intend to say.\" SW provided discussion and validation around this topic. She notes this is a new change since becoming diagnosed with ESRD>    Reading Ability: Good  Education Level: Some College Recent Legal History No      Coping Style/Strategies: cooking and being with loved ones        Ability to Adhere to Complex Medical Regime: Yes     Adherence History:        Education  _X_ Medicare  _X_ Rehabilitation  _X_ Donor issues  _X_ Community resources  _X_ Post discharge housing  _X_ Financial resources  _X_ Medical insurance options  _X_ Psych adjustment  _X_ Family adjustment  _X_ Health Care Directive Provided Education   Psychosocial Risks of Transplant Reviewed and Discussed:  _X_ Increased stress related to emotional,            family, social, employment or financial           situation  _X_ Effect on work and/or disability benefits  _X_ Effect on future health and life           insurance  _X_ Transplant outcome expectations may           not be met  _X_ Mental Health Risks: anxiety,           depression, PTSD, guilt, grief and           chronic fatigue     Notable Items:   None noted.       Final Evaluation/Assessment " "  Patient seemed to process information well. Appeared well informed, motivated and able to follow post transplant requirements. Behavior was appropriate during interview. Has adequate income and insurance coverage. Adequate social support. No major contraindications noted for transplant.  At this time patient appears to understand the risks and benefits of transplant.      Recommendation  Conditional    1. Finances: there are some general concerns with finances. She has been on an LESIA with intentions of returning to work but worries that this will \"not happen as soon as she is able.\"  provided county resources to obtain in the meanwhile. She may benefit from emergency cash assistance/general assistance. Pending working hours- she may be eligible for SSDI once dialysis begins.     2. Clarify Caregiver plan: Sheri did not have a designated person in mind but notes she will have an appropriate caregiver for transplant.     3. Neuropsych: Sheri highlighted concerns that she has noticed in recent months that are inhibiting her from comprehending information to her greatest ability. She notes that \"it is hard to explain but my brain is not able to process information in the way it has in the past which is making things very difficult right now.\"      Selection Criteria Met:  Plan for support No  Chemical Dependence Yes   Smoking Yes   Mental Health Yes   Adequate Finances No   Signature: MAGEN Lees   Title: Clinical      "

## 2022-11-15 NOTE — PROGRESS NOTES
Sheri is a 52 year old who is being evaluated via a billable video visit.      How would you like to obtain your AVS? MyChart  If the video visit is dropped, the invitation should be resent by: Send to e-mail at: [email or web address]  Will anyone else be joining your video visit? No        Video-Visit Details    Video Start Time: 10:00 am     Type of service:  Video Visit    Video End Time:10:20 am     Originating Location (pt. Location): Home        Distant Location (provider location):  Off-site    Platform used for Video Visit: [facility]    TRANSPLANT NEPHROLOGY RECIPIENT EVALUATION NOTE    Assessment and Plan:  # Kidney Transplant Evaluation: Patient is a good candidate overall. Benefits of a living donor transplant were discussed.    # CKD from polycystic kidney disease (PKD): GFR 15.  When ready, she would likely benefit from a kidney transplant.  ABO-A with a few potential donors.    # PKD: 9/2022 CT available for surgery review.  Will discuss with committee if negative MRA brain from 2005 is okay, or needs to be updated?    # Cardiac Risk: No known history of cardiac disease.  Given longstanding risk factors will need cardiac risk assessment.    # Former smoker:  35-pack-year history, recently quit. Stable 2 mm subpleural small pulmonary nodule in the posterior left lower lobe on 9/2022 CT chest.  Will need PFTs.    # Depression/anxiety: appreciate social work input.     # Health Maintenance: Colonoscopy: Not up to date, Mammogram: Not up to date, PAP: Not up to date and Dental: Not up to date    Discussed the risks and benefits of a transplant, including the risk of surgery and immunosuppression medications.  Patient's overall evaluation will be discussed in the Transplant Program's regular meeting with a final recommendation on the patients suitability for transplant to be made at that time.    Pending completion of the full evaluation, patient presently appears to be enough of an acceptable kidney  "transplant recipient candidate to have any potential kidney donors start the evaluation process.      Evaluation:  Sheri Jocye was seen in consultation at the request of Dr. Santi Cardenas for evaluation as a potential kidney transplant recipient.    Reason for Visit:  Sheri Joyce is a 52 year old female with CKD from polycystic kidney disease (PKD), who presents for kidney transplant evaluation.    History of Present Illness:           Kidney Disease Hx:        Sheri Joyce is a 52-year-old female with history of CKD from PKD. She first found out about her kidney disease 25 years ago when she had an US done in the ER after presenting for abdominal pain. Lost to follow up with neph from 2/2020-8/20222. She is forthcoming about her noncompliance there and is aware that sub optimal follow up and blood pressure/diet management likely contributed to declining kidney function. Most recent GFR was 15. Feeling more uremic. C/o  \"dull ache and heaviness\" from her kidneys. Last cyst rupture was 3 years ago, no h/o stones or recurrent UTIs.           Kidney Disease Dx: Polycystic kidney disease (PKD)       Biopsy Proven: No         On Dialysis: No       Primary Nephrologist: Dr. Davis        H/o Kidney Stones: No       H/o Recurrent/Frequent UTI: No         Diabetic Hx: None           Cardiac/Vascular Disease Risk Factors:        Cardiac Risk Factors: Hypertension, CKD and Smoking       Known CAD: No       Known PAD/Caludication Symptoms: No       Known Heart Failure: No       Arrhythmia: No       Pulmonary Hypertension: No       Valvular Disease: No       Other: None         Viral Serology Status       CMV IgG Antibody: Unknown       EBV IgG Antibody: Unknown         Volume Status/Weight:        Volume status: Not assessed       Weight:  Acceptable BMI       BMI: There is no height or weight on file to calculate BMI.         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 Reports she is not one that ever sits down, but it more limited these days d/t fatigue.  Co  fatigue with stairs, but no chest pains, shortness of breath or claudication sxs.     Fatigue/Decreased Energy: [] No [x] Yes    Chest Pain or SOB with Exertion: [x] No [] Yes    Significant Weight Change: [x] No [] Yes    Nausea, Vomiting or Diarrhea: [x] No [] Yes    Fever, Sweats or Chills:  [x] No [] Yes    Leg Swelling [x] No [] Yes        History of Cancer: None    Other Significant Medical Issues:   # Former smoker:  35-pack-year history, recently quit.    # Depression/anxiety: appreciate social work input.       Allergy Testing Questions:   Medication that caused a reaction Sufla Drugs (Sufla/TMP or Bactrim)   Antibiotics used that didn't give an allergic reaction?  None    COVID Vaccination Up To Date: Not asked    Potential Living Kidney Donors: Yes    Review of Systems:  A comprehensive review of systems was obtained and negative, except as noted in the HPI or PMH.    Past Medical History:   Medical record was reviewed and PMH was discussed with patient and noted below.  Past Medical History:   Diagnosis Date     Chronic kidney disease      Contraception 03/19/2009     Hypertension      Liver disease      Polycystic kidney, unspecified type      Thyroid disease      Varicosities        Past Social History:   Past Surgical History:   Procedure Laterality Date     LAPAROSCOPIC DECORTICATION CYST RENAL  01/01/2006     MYRINGOTOMY, INSERT TUBE(S), ADENOIDECTOMY, COMBINED       PE TUBES  age 13     SURGICAL HISTORY OF -   11/01/2005    kidney surgery for cyst removal     Personal history of bleeding or anesthesia problems: No    Family History:  Family History   Problem Relation Age of Onset     Cancer Mother         lung     Diabetes No family hx of      C.A.D. No family hx of      Hypertension No family hx of      Cerebrovascular Disease No family hx of      Breast Cancer No family hx of      Cancer -  colorectal No family hx of      Prostate Cancer No family hx of        Personal History:   Social History     Socioeconomic History     Marital status:      Spouse name: Not on file     Number of children: Not on file     Years of education: Not on file     Highest education level: Not on file   Occupational History     Not on file   Tobacco Use     Smoking status: Former     Packs/day: 0.50     Years: 10.00     Pack years: 5.00     Types: Cigarettes     Quit date: 10/17/2022     Years since quittin.0     Smokeless tobacco: Never   Substance and Sexual Activity     Alcohol use: Yes     Comment: Very occasionally     Drug use: Yes     Types: Marijuana     Comment: Smokes once daily to induce appetite.     Sexual activity: Yes     Partners: Male     Birth control/protection: None   Other Topics Concern     Parent/sibling w/ CABG, MI or angioplasty before 65F 55M? No   Social History Narrative    ** Merged History Encounter **         Dairy/d 2 servings/d.     Caffeine 3 servings/d    Exercise 5-7 x week walking    Sunscreen used - Yes    Seatbelts used - Yes    Working smoke/CO detectors in the home - NO    Guns stored in the home - No    Self Breast Exams - Yes    Self Testicular Exam - NA    Eye Exam up to date - Yes    Dental Exam up to date - No    Pap Smear up to date - Yes    Mammogram up to date - No    PSA up to date - NA    Dexa Scan up to date - NA    Flex Sig / Colonoscopy up to date - NA    Immunizations up to date - No    Abuse: Current or Past(Physical, Sexual or Emotional)- No    Do you feel safe in your environment - Yes    HONG KOCH LPN.    06         Social Determinants of Health     Financial Resource Strain: Not on file   Food Insecurity: Not on file   Transportation Needs: Not on file   Physical Activity: Not on file   Stress: Not on file   Social Connections: Not on file   Intimate Partner Violence: Not on file   Housing Stability: Not on file       Allergies:  Allergies    Allergen Reactions     Bactrim [Sulfamethoxazole W/Trimethoprim] Itching     Seasonal Allergies      Pcn [Penicillin G]        Medications:  Current Outpatient Medications   Medication Sig     amLODIPine (NORVASC) 10 MG tablet Take 1 tablet (10 mg) by mouth daily     cyclobenzaprine (FLEXERIL) 10 MG tablet Take 1 tablet (10 mg) by mouth every evening as needed     lisinopril (ZESTRIL) 40 MG tablet Take 1 tablet (40 mg) by mouth daily     sennosides (SENOKOT) 8.6 MG tablet Take 1 tablet by mouth daily as needed for constipation     varenicline (CHANTIX) 1 MG tablet Take 1 tablet (1 mg) by mouth 2 times daily     No current facility-administered medications for this visit.       Vitals:  LMP  (LMP Unknown)     Exam:  GENERAL APPEARANCE: alert and no distress  HENT: no obvious abnormalities on appearance  RESP: breathing appears unremarkable with normal rate, no audible wheezing or cough and no apparent shortness of breath with conversation  MS: extremities normal - no gross deformities noted  SKIN: no apparent rash and normal skin tone  NEURO: speech is clear with no obvious neurological deficits  PSYCH: mentation appears normal and affect normal    Results:   No results found for this or any previous visit (from the past 336 hour(s)).

## 2022-11-15 NOTE — LETTER
11/15/2022         RE: Sheri Joyce  [address]  [facility] [address]        Dear Colleague,    Thank you for referring your patient, Sheri Joyce, to the [facility]. Please see a copy of my visit note below.    Sheri is a 52 year old who is being evaluated via a billable video visit.      How would you like to obtain your AVS? MyChart  If the video visit is dropped, the invitation should be resent by: Send to e-mail at: [email or web address])  Will anyone else be joining your video visit? No        Video-Visit Details    Video Start Time: 10:00 am     Type of service:  Video Visit    Video End Time:10:20 am     Originating Location (pt. Location): Home        Distant Location (provider location):  Off-site    Platform used for Video Visit: [facility]    TRANSPLANT NEPHROLOGY RECIPIENT EVALUATION NOTE    Assessment and Plan:  # Kidney Transplant Evaluation: Patient is a good candidate overall. Benefits of a living donor transplant were discussed.    # CKD from polycystic kidney disease (PKD): GFR 15.  When ready, she would likely benefit from a kidney transplant.  ABO-A with a few potential donors.    # PKD: 9/2022 CT available for surgery review.  Will discuss with committee if negative MRA brain from 2005 is okay, or needs to be updated?    # Cardiac Risk: No known history of cardiac disease.  Given longstanding risk factors will need cardiac risk assessment.    # Former smoker:  35-pack-year history, recently quit. Stable 2 mm subpleural small pulmonary nodule in the posterior left lower lobe on 9/2022 CT chest.  Will need PFTs.    # Depression/anxiety: appreciate social work input.     # Health Maintenance: Colonoscopy: Not up to date, Mammogram: Not up to date, PAP: Not up to date and Dental: Not up to date    Discussed the risks and benefits of a transplant, including the risk of surgery and immunosuppression medications.  Patient's overall evaluation will be  "discussed in the Transplant Program's regular meeting with a final recommendation on the patients suitability for transplant to be made at that time.    Pending completion of the full evaluation, patient presently appears to be enough of an acceptable kidney transplant recipient candidate to have any potential kidney donors start the evaluation process.      Evaluation:  Sheri Joyce was seen in consultation at the request of Dr. Santi Cardenas for evaluation as a potential kidney transplant recipient.    Reason for Visit:  Sheri Joyce is a 52 year old female with CKD from polycystic kidney disease (PKD), who presents for kidney transplant evaluation.    History of Present Illness:           Kidney Disease Hx:        Sheri Joyce is a 52-year-old female with history of CKD from PKD. She first found out about her kidney disease 25 years ago when she had an US done in the ER after presenting for abdominal pain. Lost to follow up with neph from 2/2020-8/20222. She is forthcoming about her noncompliance there and is aware that sub optimal follow up and blood pressure/diet management likely contributed to declining kidney function. Most recent GFR was 15. Feeling more uremic. C/o  \"dull ache and heaviness\" from her kidneys. Last cyst rupture was 3 years ago, no h/o stones or recurrent UTIs.           Kidney Disease Dx: Polycystic kidney disease (PKD)       Biopsy Proven: No         On Dialysis: No       Primary Nephrologist: Dr. Davis        H/o Kidney Stones: No       H/o Recurrent/Frequent UTI: No         Diabetic Hx: None           Cardiac/Vascular Disease Risk Factors:        Cardiac Risk Factors: Hypertension, CKD and Smoking       Known CAD: No       Known PAD/Caludication Symptoms: No       Known Heart Failure: No       Arrhythmia: No       Pulmonary Hypertension: No       Valvular Disease: No       Other: None         Viral Serology Status       CMV IgG Antibody: Unknown       EBV IgG " Antibody: Unknown         Volume Status/Weight:        Volume status: Not assessed       Weight:  Acceptable BMI       BMI: There is no height or weight on file to calculate BMI.         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 Reports she is not one that ever sits down, but it more limited these days d/t fatigue.  Co  fatigue with stairs, but no chest pains, shortness of breath or claudication sxs.     Fatigue/Decreased Energy: [] No [x] Yes    Chest Pain or SOB with Exertion: [x] No [] Yes    Significant Weight Change: [x] No [] Yes    Nausea, Vomiting or Diarrhea: [x] No [] Yes    Fever, Sweats or Chills:  [x] No [] Yes    Leg Swelling [x] No [] Yes        History of Cancer: None    Other Significant Medical Issues:   # Former smoker:  35-pack-year history, recently quit.    # Depression/anxiety: appreciate social work input.       Allergy Testing Questions:   Medication that caused a reaction Sufla Drugs (Sufla/TMP or Bactrim)   Antibiotics used that didn't give an allergic reaction?  None    COVID Vaccination Up To Date: Not asked    Potential Living Kidney Donors: Yes    Review of Systems:  A comprehensive review of systems was obtained and negative, except as noted in the HPI or PMH.    Past Medical History:   Medical record was reviewed and PMH was discussed with patient and noted below.  Past Medical History:   Diagnosis Date     Chronic kidney disease      Contraception 03/19/2009     Hypertension      Liver disease      Polycystic kidney, unspecified type      Thyroid disease      Varicosities        Past Social History:   Past Surgical History:   Procedure Laterality Date     LAPAROSCOPIC DECORTICATION CYST RENAL  01/01/2006     MYRINGOTOMY, INSERT TUBE(S), ADENOIDECTOMY, COMBINED       PE TUBES  age 13     SURGICAL HISTORY OF -   11/01/2005    kidney surgery for cyst removal     Personal history of bleeding or anesthesia problems: No    Family History:  Family  History   Problem Relation Age of Onset     Cancer Mother         lung     Diabetes No family hx of      C.A.D. No family hx of      Hypertension No family hx of      Cerebrovascular Disease No family hx of      Breast Cancer No family hx of      Cancer - colorectal No family hx of      Prostate Cancer No family hx of        Personal History:   Social History     Socioeconomic History     Marital status:      Spouse name: Not on file     Number of children: Not on file     Years of education: Not on file     Highest education level: Not on file   Occupational History     Not on file   Tobacco Use     Smoking status: Former     Packs/day: 0.50     Years: 10.00     Pack years: 5.00     Types: Cigarettes     Quit date: 10/17/2022     Years since quittin.0     Smokeless tobacco: Never   Substance and Sexual Activity     Alcohol use: Yes     Comment: Very occasionally     Drug use: Yes     Types: Marijuana     Comment: Smokes once daily to induce appetite.     Sexual activity: Yes     Partners: Male     Birth control/protection: None   Other Topics Concern     Parent/sibling w/ CABG, MI or angioplasty before 65F 55M? No   Social History Narrative    ** Merged History Encounter **         Dairy/d 2 servings/d.     Caffeine 3 servings/d    Exercise 5-7 x week walking    Sunscreen used - Yes    Seatbelts used - Yes    Working smoke/CO detectors in the home - NO    Guns stored in the home - No    Self Breast Exams - Yes    Self Testicular Exam - NA    Eye Exam up to date - Yes    Dental Exam up to date - No    Pap Smear up to date - Yes    Mammogram up to date - No    PSA up to date - NA    Dexa Scan up to date - NA    Flex Sig / Colonoscopy up to date - NA    Immunizations up to date - No    Abuse: Current or Past(Physical, Sexual or Emotional)- No    Do you feel safe in your environment - Yes    HONG KOCH LPN.    06         Social Determinants of Health     Financial Resource Strain: Not on file   Food  Insecurity: Not on file   Transportation Needs: Not on file   Physical Activity: Not on file   Stress: Not on file   Social Connections: Not on file   Intimate Partner Violence: Not on file   Housing Stability: Not on file       Allergies:  Allergies   Allergen Reactions     Bactrim [Sulfamethoxazole W/Trimethoprim] Itching     Seasonal Allergies      Pcn [Penicillin G]        Medications:  Current Outpatient Medications   Medication Sig     amLODIPine (NORVASC) 10 MG tablet Take 1 tablet (10 mg) by mouth daily     cyclobenzaprine (FLEXERIL) 10 MG tablet Take 1 tablet (10 mg) by mouth every evening as needed     lisinopril (ZESTRIL) 40 MG tablet Take 1 tablet (40 mg) by mouth daily     sennosides (SENOKOT) 8.6 MG tablet Take 1 tablet by mouth daily as needed for constipation     varenicline (CHANTIX) 1 MG tablet Take 1 tablet (1 mg) by mouth 2 times daily     No current facility-administered medications for this visit.       Vitals:  LMP  (LMP Unknown)     Exam:  GENERAL APPEARANCE: alert and no distress  HENT: no obvious abnormalities on appearance  RESP: breathing appears unremarkable with normal rate, no audible wheezing or cough and no apparent shortness of breath with conversation  MS: extremities normal - no gross deformities noted  SKIN: no apparent rash and normal skin tone  NEURO: speech is clear with no obvious neurological deficits  PSYCH: mentation appears normal and affect normal    Results:   No results found for this or any previous visit (from the past 336 hour(s)).

## 2022-11-15 NOTE — LETTER
11/15/2022         RE: Sheri Joyce  [address]  [place] [address]        Dear Colleague,    Thank you for referring your patient, Sheri Joyce, to the [facility]. Please see a copy of my visit note below.    Originating Location (pt. Location): home     Distant Location (provider location):  offsite     Platform used for Video Visit: fredrick castano visit start time: 10:30am  Video visit end time: 11:10am      Transplant Surgery Consult Note     Medical record number: [id number]  YOB: 1970,   Consult requested by Dr. Davis for evaluation of kidney transplant candidacy.    Assessment and Recommendations:Ms. Joyce appears to be a good candidate for kidney transplantation and has a good understanding of the risks and benefits of this approach to the management of renal failure. The following issues should be addressed prior to finalizing her transplant candidacy:     Ms. Joyce has Chronic renal failure due to polycystic kidney disease (PKD) whose condition is not expected to resolve, is expected to progress, and is expected to continue to develop related comorbid conditions.    Dietician consult ordered: Yes  Social work consult ordered: Yes  Transplant listing labs ordered to include HLA, ABOx2, Cr, etc.  Cardiology consult for cardiac risk stratification to be ordered: No  Obtain past medical records  Up-to-date cancer screening    The majority of our visit was spent in counselling, discussing the medical and surgical risks of kidney transplantation. We talked about the pros and cons of transplantation vs. dialysis.  We discussed the fact that it was important to think about the pros and cons of each treatment option and make an active decision.  We also discussed the fact that the two were interconnected and that if the transplant failed, it is possible that dialysis might be necessary before another transplant.  We discussed operative risks. We  "discussed immunosuppression side effects.  In addition we discussed the critical need for adherence to the immunosuppressive regimen as well as to maintain schedule of blood draws and follow-up clinic visits.         We also discussed the fact that if choosing to have a transplant, a second important decision was a living versus a  donor transplant.  We talked about the pros and cons of each option - the advantage of a  donor transplant being not asking someone to go through the living donor operation, the disadvantage, 5-6 years waiting; the advantage of a living donor transplant, much shorter time to transplant and significantly better long-term outcomes, the disadvantage being the risk to the donor.  Although I didn't express an opinion regarding transplantation or dialysis, I suggested that if opting for a transplant, we would strongly recommend a living donor transplant.      We discussed the benefit of a preemptive transplant.     We discussed the fact that a living donor does not have to be a direct match and that if there was a donor who met the criteria but was not a match, there was an option of participating in the national paired exchange system.  I described how the system would work.     also discussed the new ( donor) kidney (KDPI) scoring system. We discussed the advantages and disadvantages of accepting an \"expanded criteria\" donor kidney, and the latest data as to who potentially benefits - those >45 and/or having diabetes a cause for ESKD - by receiving an expanded criteria donor kidney versus waiting longer for a standard criteria donor. I todl her that forth er age, saying  yes  would be recommended, but considering that she is not on dialysis yet, she could consider saying  no  for now.    We also discussed coming into clinic annually to make sure her kidneys had not grown so large that they would need to be removed before transplantation.  (Although this was a virtual " visit, she was seen by Dr. Hagen last week).   Ms. Joyce asked good questions and her candidacy will be reviewed at our Multidisciplinary Selection Committee. Thank you for the opportunity to participate in Ms. Joyce's care.    40 min spent on the date of the encounter in chart review, patient visit,  documentation and/or discussion with other providers about the issues documented above.          Santi Cardenas MD  Surgical Director, Kidney Transplantation                                                                                                      ---------------------------------------------------------------------------------------------------    Past medical history includes:  CKD, stage 4  Pkd  liver systic disease  hypertension       Past Medical History:   Diagnosis Date     Anxiety      Chronic kidney disease      Contraception 03/19/2009     Depression      Former tobacco use      Hypertension      Liver disease      PKD (polycystic kidney disease)      Polycystic kidney, unspecified type      Thyroid disease      Varicosities      Past Surgical History:   Procedure Laterality Date     LAPAROSCOPIC DECORTICATION CYST RENAL  01/01/2006     MYRINGOTOMY, INSERT TUBE(S), ADENOIDECTOMY, COMBINED       PE TUBES  age 13     SURGICAL HISTORY OF -   11/01/2005    kidney surgery for cyst removal     Family History   Problem Relation Age of Onset     Lung Cancer Mother         lung     Hypertension Father      Breast Cancer Maternal Aunt      Breast Cancer Cousin      Breast Cancer Cousin      Cerebrovascular Disease Paternal Aunt 70     Diabetes No family hx of      C.A.D. No family hx of      Cancer - colorectal No family hx of      Prostate Cancer No family hx of      Social History     Socioeconomic History     Marital status:      Spouse name: Not on file     Number of children: Not on file     Years of education: Not on file     Highest education level: Not on file   Occupational History      Not on file   Tobacco Use     Smoking status: Former     Packs/day: 0.50     Years: 10.00     Pack years: 5.00     Types: Cigarettes     Quit date: 10/17/2022     Years since quittin.0     Smokeless tobacco: Never   Substance and Sexual Activity     Alcohol use: Yes     Comment: Very occasionally     Drug use: Yes     Types: Marijuana     Comment: Smokes once daily to induce appetite.     Sexual activity: Yes     Partners: Male     Birth control/protection: None   Other Topics Concern     Parent/sibling w/ CABG, MI or angioplasty before 65F 55M? No   Social History Narrative    ** Merged History Encounter **         Dairy/d 2 servings/d.     Caffeine 3 servings/d    Exercise 5-7 x week walking    Sunscreen used - Yes    Seatbelts used - Yes    Working smoke/CO detectors in the home - NO    Guns stored in the home - No    Self Breast Exams - Yes    Self Testicular Exam - NA    Eye Exam up to date - Yes    Dental Exam up to date - No    Pap Smear up to date - Yes    Mammogram up to date - No    PSA up to date - NA    Dexa Scan up to date - NA    Flex Sig / Colonoscopy up to date - NA    Immunizations up to date - No    Abuse: Current or Past(Physical, Sexual or Emotional)- No    Do you feel safe in your environment - Yes    HONG KOCH LPN.    06         Social Determinants of Health     Financial Resource Strain: Not on file   Food Insecurity: Not on file   Transportation Needs: Not on file   Physical Activity: Not on file   Stress: Not on file   Social Connections: Not on file   Intimate Partner Violence: Not on file   Housing Stability: Not on file       ROS:   CONSTITUTIONAL:  No fevers or chills  EYES: negative for icterus  ENT:  negative for hearing loss, tinnitus and sore throat  RESPIRATORY:  negative for cough, sputum, dyspnea  CARDIOVASCULAR:  negative for chest pain  GASTROINTESTINAL:  negative for nausea, vomiting, diarrhea or constipation  GENITOURINARY:  negative for incontinence, dysuria,  bladder emptying problems  HEME:  No easy bruising  INTEGUMENT:  negative for rash and pruritus  NEURO:  Negative for headache, seizure disorder  Allergies:   Allergies   Allergen Reactions     Bactrim [Sulfamethoxazole W/Trimethoprim] Itching     Seasonal Allergies      Pcn [Penicillin G]      Medications:  Prescription Medications as of 11/22/2022       Rx Number Disp Refills Start End Last Dispensed Date Next Fill Date Owning Pharmacy    amLODIPine (NORVASC) 10 MG tablet  90 tablet 3 10/12/2022    [id number] [address]    Sig: Take 1 tablet (10 mg) by mouth daily    Class: E-Prescribe    Route: Oral    cyclobenzaprine (FLEXERIL) 10 MG tablet  30 tablet 3 8/15/2019    [facility] #[id number] [facility] [address] [place] [facility]    Sig: Take 1 tablet (10 mg) by mouth every evening as needed    Class: E-Prescribe    Route: Oral    lisinopril (ZESTRIL) 40 MG tablet  90 tablet 1 4/23/2020    [facility] #[id number] [facility] [address] [facility] [place] [facility]    Sig: Take 1 tablet (40 mg) by mouth daily    Class: E-Prescribe    Route: Oral    sennosides (SENOKOT) 8.6 MG tablet            Sig: Take 1 tablet by mouth daily as needed for constipation    Class: Historical    Route: Oral    varenicline (CHANTIX) 1 MG tablet  60 tablet 2 10/12/2022    [id number] [address] [facility] [address] [facility] [address] [facility] [address]    Sig: Take 1 tablet (1 mg) by mouth 2 times daily    Class: E-Prescribe    Route: Oral        Exam:   Constitutional - A&O in NAD.   Eyes - no redness or discharge.  Sclera anicteric  Respiratory - no cough, no labored breathing  Musculoskeletal - range of motion normal  Skin - no discoloration, no jaundice  Neurological - no tremors.  No facial droop or dysarthria  Psychiatric - normal mood and affect  The rest of a comprehensive physical  examination is deferred due to PHE (public health emergency) video visit restrictions     Diagnostics:   No results found for this or any previous visit (from the past 672 hour(s)).  No results found for: CPRA      Again, thank you for allowing me to participate in the care of your patient.        Sincerely,        No name on file

## 2022-11-21 ENCOUNTER — DOCUMENTATION ONLY (OUTPATIENT)
Dept: TRANSPLANT | Facility: CLINIC | Age: 52
End: 2022-11-21

## 2022-11-21 NOTE — PROGRESS NOTES
Review of the CT done 9/2022 and on exam she has room on the left for kidney transplant. The right native kidney extends down into the pelvis and therefore the right iliac system would not be ideal to proceed with transplant. Does not currently need nephrectomy to proceed with transplant.

## 2022-11-22 ENCOUNTER — MYC MEDICAL ADVICE (OUTPATIENT)
Dept: NEPHROLOGY | Facility: CLINIC | Age: 52
End: 2022-11-22

## 2022-11-22 ENCOUNTER — PATIENT OUTREACH (OUTPATIENT)
Dept: NEPHROLOGY | Facility: CLINIC | Age: 52
End: 2022-11-22

## 2022-11-22 DIAGNOSIS — N18.5 CHRONIC KIDNEY DISEASE, STAGE 5 (H): Primary | ICD-10-CM

## 2022-11-22 NOTE — PROGRESS NOTES
Originating Location (pt. Location): home     Distant Location (provider location):  offsite     Platform used for Video Visit: fredrick castano visit start time: 10:30am  Video visit end time: 11:10am      Transplant Surgery Consult Note     Medical record number: [id number]  YOB: 1970,   Consult requested by Dr. Davis for evaluation of kidney transplant candidacy.    Assessment and Recommendations:Ms. Joyce appears to be a good candidate for kidney transplantation and has a good understanding of the risks and benefits of this approach to the management of renal failure. The following issues should be addressed prior to finalizing her transplant candidacy:     Ms. Joyce has Chronic renal failure due to polycystic kidney disease (PKD) whose condition is not expected to resolve, is expected to progress, and is expected to continue to develop related comorbid conditions.    Dietician consult ordered: Yes  Social work consult ordered: Yes  Transplant listing labs ordered to include HLA, ABOx2, Cr, etc.  Cardiology consult for cardiac risk stratification to be ordered: No  Obtain past medical records  Up-to-date cancer screening    The majority of our visit was spent in counselling, discussing the medical and surgical risks of kidney transplantation. We talked about the pros and cons of transplantation vs. dialysis.  We discussed the fact that it was important to think about the pros and cons of each treatment option and make an active decision.  We also discussed the fact that the two were interconnected and that if the transplant failed, it is possible that dialysis might be necessary before another transplant.  We discussed operative risks. We discussed immunosuppression side effects.  In addition we discussed the critical need for adherence to the immunosuppressive regimen as well as to maintain schedule of blood draws and follow-up clinic visits.         We also discussed the fact that if choosing to  "have a transplant, a second important decision was a living versus a  donor transplant.  We talked about the pros and cons of each option - the advantage of a  donor transplant being not asking someone to go through the living donor operation, the disadvantage, 5-6 years waiting; the advantage of a living donor transplant, much shorter time to transplant and significantly better long-term outcomes, the disadvantage being the risk to the donor.  Although I didn't express an opinion regarding transplantation or dialysis, I suggested that if opting for a transplant, we would strongly recommend a living donor transplant.      We discussed the benefit of a preemptive transplant.     We discussed the fact that a living donor does not have to be a direct match and that if there was a donor who met the criteria but was not a match, there was an option of participating in the national paired exchange system.  I described how the system would work.     also discussed the new ( donor) kidney (KDPI) scoring system. We discussed the advantages and disadvantages of accepting an \"expanded criteria\" donor kidney, and the latest data as to who potentially benefits - those >45 and/or having diabetes a cause for ESKD - by receiving an expanded criteria donor kidney versus waiting longer for a standard criteria donor. I todl her that forth er age, saying  yes  would be recommended, but considering that she is not on dialysis yet, she could consider saying  no  for now.    We also discussed coming into clinic annually to make sure her kidneys had not grown so large that they would need to be removed before transplantation.  (Although this was a virtual visit, she was seen by Dr. Hagen last week).   Ms. Joyce asked good questions and her candidacy will be reviewed at our Multidisciplinary Selection Committee. Thank you for the opportunity to participate in Ms. Joyce's care.    40 min spent on the date of the " encounter in chart review, patient visit,  documentation and/or discussion with other providers about the issues documented above.          Santi Cardenas MD  Surgical Director, Kidney Transplantation                                                                                                      ---------------------------------------------------------------------------------------------------    Past medical history includes:  CKD, stage 4  Pkd  liver systic disease  hypertension       Past Medical History:   Diagnosis Date     Anxiety      Chronic kidney disease      Contraception 2009     Depression      Former tobacco use      Hypertension      Liver disease      PKD (polycystic kidney disease)      Polycystic kidney, unspecified type      Thyroid disease      Varicosities      Past Surgical History:   Procedure Laterality Date     LAPAROSCOPIC DECORTICATION CYST RENAL  2006     MYRINGOTOMY, INSERT TUBE(S), ADENOIDECTOMY, COMBINED       PE TUBES  age 13     SURGICAL HISTORY OF -   2005    kidney surgery for cyst removal     Family History   Problem Relation Age of Onset     Lung Cancer Mother         lung     Hypertension Father      Breast Cancer Maternal Aunt      Breast Cancer Cousin      Breast Cancer Cousin      Cerebrovascular Disease Paternal Aunt 70     Diabetes No family hx of      C.A.D. No family hx of      Cancer - colorectal No family hx of      Prostate Cancer No family hx of      Social History     Socioeconomic History     Marital status:      Spouse name: Not on file     Number of children: Not on file     Years of education: Not on file     Highest education level: Not on file   Occupational History     Not on file   Tobacco Use     Smoking status: Former     Packs/day: 0.50     Years: 10.00     Pack years: 5.00     Types: Cigarettes     Quit date: 10/17/2022     Years since quittin.0     Smokeless tobacco: Never   Substance and Sexual Activity     Alcohol use:  Yes     Comment: Very occasionally     Drug use: Yes     Types: Marijuana     Comment: Smokes once daily to induce appetite.     Sexual activity: Yes     Partners: Male     Birth control/protection: None   Other Topics Concern     Parent/sibling w/ CABG, MI or angioplasty before 65F 55M? No   Social History Narrative    ** Merged History Encounter **         Dairy/d 2 servings/d.     Caffeine 3 servings/d    Exercise 5-7 x week walking    Sunscreen used - Yes    Seatbelts used - Yes    Working smoke/CO detectors in the home - NO    Guns stored in the home - No    Self Breast Exams - Yes    Self Testicular Exam - NA    Eye Exam up to date - Yes    Dental Exam up to date - No    Pap Smear up to date - Yes    Mammogram up to date - No    PSA up to date - NA    Dexa Scan up to date - NA    Flex Sig / Colonoscopy up to date - NA    Immunizations up to date - No    Abuse: Current or Past(Physical, Sexual or Emotional)- No    Do you feel safe in your environment - Yes    HONG KOCH LPN.    06/02/06         Social Determinants of Health     Financial Resource Strain: Not on file   Food Insecurity: Not on file   Transportation Needs: Not on file   Physical Activity: Not on file   Stress: Not on file   Social Connections: Not on file   Intimate Partner Violence: Not on file   Housing Stability: Not on file       ROS:   CONSTITUTIONAL:  No fevers or chills  EYES: negative for icterus  ENT:  negative for hearing loss, tinnitus and sore throat  RESPIRATORY:  negative for cough, sputum, dyspnea  CARDIOVASCULAR:  negative for chest pain  GASTROINTESTINAL:  negative for nausea, vomiting, diarrhea or constipation  GENITOURINARY:  negative for incontinence, dysuria, bladder emptying problems  HEME:  No easy bruising  INTEGUMENT:  negative for rash and pruritus  NEURO:  Negative for headache, seizure disorder  Allergies:   Allergies   Allergen Reactions     Bactrim [Sulfamethoxazole W/Trimethoprim] Itching     Seasonal Allergies       Pcn [Penicillin G]      Medications:  Prescription Medications as of 11/22/2022       Rx Number Disp Refills Start End Last Dispensed Date Next Fill Date Owning Pharmacy    amLODIPine (NORVASC) 10 MG tablet  90 tablet 3 10/12/2022    [id number] - [place], [place] - [address] AT [address]    Sig: Take 1 tablet (10 mg) by mouth daily    Class: E-Prescribe    Route: Oral    cyclobenzaprine (FLEXERIL) 10 MG tablet  30 tablet 3 8/15/2019    [facility]/pharmacy #[id number] [address] [place] AT [facility]    Sig: Take 1 tablet (10 mg) by mouth every evening as needed    Class: E-Prescribe    Route: Oral    lisinopril (ZESTRIL) 40 MG tablet  90 tablet 1 4/23/2020    [facility]/pharmacy #[id number] [place], [place] - [address] [place] AT [facility]    Sig: Take 1 tablet (40 mg) by mouth daily    Class: E-Prescribe    Route: Oral    sennosides (SENOKOT) 8.6 MG tablet            Sig: Take 1 tablet by mouth daily as needed for constipation    Class: Historical    Route: Oral    varenicline (CHANTIX) 1 MG tablet  60 tablet 2 10/12/2022    [id number] - [place] [place] - [address] AT [address]    Sig: Take 1 tablet (1 mg) by mouth 2 times daily    Class: E-Prescribe    Route: Oral        Exam:   Constitutional - A&O in NAD.   Eyes - no redness or discharge.  Sclera anicteric  Respiratory - no cough, no labored breathing  Musculoskeletal - range of motion normal  Skin - no discoloration, no jaundice  Neurological - no tremors.  No facial droop or dysarthria  Psychiatric - normal mood and affect  The rest of a comprehensive physical examination is deferred due to PHE (public health emergency) video visit restrictions     Diagnostics:   No results found for this or any previous visit (from the past 672 hour(s)).  No results found for: CPRA

## 2022-11-29 ENCOUNTER — MYC MEDICAL ADVICE (OUTPATIENT)
Dept: NEPHROLOGY | Facility: CLINIC | Age: 52
End: 2022-11-29

## 2022-11-29 DIAGNOSIS — N18.5 CHRONIC KIDNEY DISEASE, STAGE 5 (H): Primary | ICD-10-CM

## 2022-11-30 ENCOUNTER — ANCILLARY PROCEDURE (OUTPATIENT)
Dept: MAMMOGRAPHY | Facility: CLINIC | Age: 52
End: 2022-11-30
Attending: NURSE PRACTITIONER
Payer: COMMERCIAL

## 2022-11-30 ENCOUNTER — LAB (OUTPATIENT)
Dept: LAB | Facility: CLINIC | Age: 52
End: 2022-11-30
Payer: COMMERCIAL

## 2022-11-30 DIAGNOSIS — Z12.31 ENCOUNTER FOR SCREENING MAMMOGRAM FOR BREAST CANCER: ICD-10-CM

## 2022-11-30 DIAGNOSIS — N18.5 CHRONIC KIDNEY DISEASE, STAGE 5 (H): ICD-10-CM

## 2022-11-30 LAB
ALBUMIN MFR UR ELPH: 54.8 MG/DL
ALBUMIN SERPL-MCNC: 3.6 G/DL (ref 3.4–5)
ANION GAP SERPL CALCULATED.3IONS-SCNC: 6 MMOL/L (ref 3–14)
BUN SERPL-MCNC: 40 MG/DL (ref 7–30)
CALCIUM SERPL-MCNC: 9 MG/DL (ref 8.5–10.1)
CHLORIDE BLD-SCNC: 107 MMOL/L (ref 94–109)
CO2 SERPL-SCNC: 24 MMOL/L (ref 20–32)
CREAT SERPL-MCNC: 3.75 MG/DL (ref 0.52–1.04)
CREAT UR-MCNC: 38 MG/DL
GFR SERPL CREATININE-BSD FRML MDRD: 14 ML/MIN/1.73M2
GLUCOSE BLD-MCNC: 105 MG/DL (ref 70–99)
HGB BLD-MCNC: 11.3 G/DL (ref 11.7–15.7)
PHOSPHATE SERPL-MCNC: 4.4 MG/DL (ref 2.5–4.5)
POTASSIUM BLD-SCNC: 3.7 MMOL/L (ref 3.4–5.3)
PROT/CREAT 24H UR: 1.44 MG/MG CR (ref 0–0.2)
SODIUM SERPL-SCNC: 137 MMOL/L (ref 133–144)

## 2022-11-30 PROCEDURE — 85018 HEMOGLOBIN: CPT

## 2022-11-30 PROCEDURE — 36415 COLL VENOUS BLD VENIPUNCTURE: CPT

## 2022-11-30 PROCEDURE — 77067 SCR MAMMO BI INCL CAD: CPT | Performed by: RADIOLOGY

## 2022-11-30 PROCEDURE — 80069 RENAL FUNCTION PANEL: CPT

## 2022-11-30 PROCEDURE — 84156 ASSAY OF PROTEIN URINE: CPT

## 2022-11-30 PROCEDURE — 83970 ASSAY OF PARATHORMONE: CPT

## 2022-11-30 NOTE — TELEPHONE ENCOUNTER
Patient sent a [employer] message in regards to concerns about her recent labs, restless legs, and increased anxiety. Routing to Dr Davis for review.  LORA Gooden

## 2022-12-01 LAB — PTH-INTACT SERPL-MCNC: 61 PG/ML (ref 15–65)

## 2022-12-02 ENCOUNTER — PATIENT OUTREACH (OUTPATIENT)
Dept: NEPHROLOGY | Facility: CLINIC | Age: 52
End: 2022-12-02

## 2022-12-02 DIAGNOSIS — G25.81 RESTLESS LEG SYNDROME: Primary | ICD-10-CM

## 2022-12-02 RX ORDER — GABAPENTIN 100 MG/1
100 CAPSULE ORAL AT BEDTIME
Qty: 30 CAPSULE | Refills: 0 | Status: SHIPPED | OUTPATIENT
Start: 2022-12-02 | End: 2023-01-06

## 2022-12-02 NOTE — PROGRESS NOTES
Contacted by nephrology that pt is having an increase in uremic symptoms.  Pt will likely need dialysis access surgery in the near future.  Sent request to hold a surgery slot for pt.  Will follow up on Monday to confirm ok to schedule.    KARENA VIZCARRA RN on 12/2/2022 at 1:51 PM  Dialysis Access Care Coordinator  Phone: [phone]  Please use P_Dialysis_Access_Nurse pool for Epic InBasket communications to ensure timely response.

## 2022-12-05 ENCOUNTER — PATIENT OUTREACH (OUTPATIENT)
Dept: NEPHROLOGY | Facility: CLINIC | Age: 52
End: 2022-12-05

## 2022-12-05 NOTE — PROGRESS NOTES
"Contacted patient to discuss dialysis planning.     Patient reports that she is feeling ready to schedule PD catheter placement. She is no longer working at this time. She is noting more bad days than good, though she reports that she does still have good days.     The Neurontin has helped her sleep better last night. She was happy with the response that she had from the medication.     Her biggest complaint relating to uremic symptoms is the fatigue. She continues to have an appetite and is finding foods that are better for her. She is staying hydrated and is not on a fluid restriction at this time. She denies any fluid retention. Her concentration is off and is feeling more forgetful.    She has been in touch with Lisa PD nurse at [facility] and is feeling \"totally informed\" and okay to move forward with catheter placement. Offered additional visit with Dr. Davis this week, though she declined the need.     Patient to have Hep B and TB lab testing this week to prepare for dialysis unit admission. Dr. Boswell, PD nephrologist is aware of patient.     Next step: Sending note to dialysis access team for PD cath . [facility] admission paperwork in process. Awaiting lab completion. Patient will call to schedule this week.    LORA Eugene        "

## 2022-12-06 ENCOUNTER — TELEPHONE (OUTPATIENT)
Dept: TRANSPLANT | Facility: CLINIC | Age: 52
End: 2022-12-06

## 2022-12-06 NOTE — TELEPHONE ENCOUNTER
Contacted patient and introduced myself as their Transplant Coordinator, also introduced the role of the Transplant Coordinator in the transplant process.  Explained the purpose of this call including reviewing next steps and answering questions.  Neuropsychology clinic will call to make appt, already scheduled 12/17/20222 for labs, cxr, ekg, echo, Dr. Hagen in person and PFT's.  Pt has already completed mammogram. Pt to schedule appts for PAP, dental, colonoscopy and for needed vaccinations. Pt to work with Dr. Davis to have brain MRI scheduled. Pt needs to sign KDPI > 85% - will email to pt. Pt needs to review [facility]plant Place education - scheduled appt Thursday Dec 8th at 11:00 am for this. Pt to have live donors register now.   Confirmed Referring Provider, Dialysis Center, and Primary Care Physician. Notified patient of the importance of continued communication with referring providers and primary care physicians.    Reviewed components of transplant evaluation process including necessary appointments, tests, and procedures.    Answered questions for patient regarding evaluation, provided my name and contact information and requested they call with any additional questions.    Determined that patient would like additional information regarding transplant by:     Drop Down choices: Mail, Email, MyChart, Phone Call   Encourage MyChart   Patient expressed excellent understanding of all and was in good agreement with the plan.     Generated Transplant Evaluation Summary Letter today in Epic - electronically routed to pt/providers.

## 2022-12-06 NOTE — LETTER
12/06/22        Sheri Joyce  [address] [place] [address]        Dear Sheri,    It was a pleasure to see you recently for consideration of kidney transplantation. Your pre-transplant evaluation results were reviewed at our Multidisciplinary Selection Committee on 11/23/2022. The Committee is requesting the following items are completed before determining your candidacy:    1. Pre transplant lab work. Please do this at any one of your upcoming lab appointments.   2. Chest x-ray for pre-operative assessment. Appt is on 12/27/2022.   3. EKG, echocardiogram and cardiologist appointment for evaluation of your heart status and for a recommendation to proceed with kidney transplant surgery. EKG/ echo appts are on 12/27/2022. Cardiologist appt is on 01/12/2023.   4. Pulmonary function test ( PFT ) to assess your lung function due to our history of smoking. Appt is on 12/27/2022.   5. Neuropsychologist appointment to assess your skills for ability to manage a complex post transplant regimen. A  from this clinic will call you to make this appointment.   6. Brain MRI now to assess for aneurysms. Please work with Dr. Davis to schedule this.   7. Vaccinations need to be up to date for: COVID 19, hepatitis B, pneumovax and Shingrix. Please work with your primary care clinic for this.   8. Dental work needs to be up to date. Please make an appointment now for a check-up with a dentist of your choice if you have not had one in the last 12 months.   9. Health maintenance needs to remain up to date for: mammogram, PAP smear and colonoscopy. Please work with your regular providers for this.   10. KDPI > 85% consent needs to be signed. This will be emailed to you from Brianna in our Office soon. You have already signed the Receipt of Information consent form.   11. Review of My Transplant Place education over the phone with myself. I will call you at our scheduled appointment on Thursday, December 8th at  11:00 am for this.     Upon successful completion of items # 1, 10 and 11 you will be eligible to be added onto the kidney transplant waitlist on INACTIVE status while you complete the above pending items. You need to let your coordinator know once you have completed all of the above items so the results can then be reviewed at the Multidisciplinary Selection Committee for approval. When approved, you will be eligible to be changed to ACTIVE status on the waitlist as well as eligible to proceed with a live donor kidney transplant in the event you have an approved live donor.     Please have any potential live kidney donors register online at [facility] to initiate their evaluations through our program's living donor screening tool at [email or web address]. Please note that potential donors will get an e-mail response once they submit their form within 1-2 business days. Potential donors may call our Main Office Number at [phone] and ask to speak with a donor coordinator if they have questions about the process. You will be notified by your transplant coordinator if a live donor has been approved for donation.     For any questions, please contact myself at the Transplant Office Main Number at [phone] or at my Direct Number at [phone].      Sincerely,  Regine Roe RN, BSN  Pre Kidney Pancreas Transplant Coordinator   [facility]  Solid Organ Transplant Care   [address] [place] [address] [address]  [place] [address]  [email or web address]   Office Number: [phone] Direct Number: [phone]   Fax Number: [phone]  Employed by [employer]     CC's: Tianna Vieyra CNP, Dr. Jayne Davis

## 2022-12-07 ENCOUNTER — MYC MEDICAL ADVICE (OUTPATIENT)
Dept: OTHER | Age: 52
End: 2022-12-07

## 2022-12-07 LAB
ABO/RH(D): NORMAL
ANTIBODY SCREEN: NEGATIVE
SPECIMEN EXPIRATION DATE: NORMAL

## 2022-12-07 NOTE — TELEPHONE ENCOUNTER
Request sent to schedule PD catheter surgery after 12/27 per pt request.  CC'd nephrology RNYISEL, Valorie on request.    Will continue to follow.    KARENA VIZCARRA RN on 12/7/2022 at 8:38 AM  Dialysis Access Care Coordinator  Phone: [phone]  Please use P_Dialysis_Access_Nurse pool for Epic InBasket communications to ensure timely response.

## 2022-12-08 ENCOUNTER — LAB (OUTPATIENT)
Dept: LAB | Facility: CLINIC | Age: 52
End: 2022-12-08
Payer: COMMERCIAL

## 2022-12-08 ENCOUNTER — PREP FOR PROCEDURE (OUTPATIENT)
Dept: TRANSPLANT | Facility: CLINIC | Age: 52
End: 2022-12-08

## 2022-12-08 DIAGNOSIS — Z76.82 ORGAN TRANSPLANT CANDIDATE: ICD-10-CM

## 2022-12-08 DIAGNOSIS — N18.6 ESRD (END STAGE RENAL DISEASE) (H): Primary | ICD-10-CM

## 2022-12-08 DIAGNOSIS — Q61.3 POLYCYSTIC KIDNEY DISEASE: ICD-10-CM

## 2022-12-08 DIAGNOSIS — Z87.891 HISTORY OF TOBACCO USE: ICD-10-CM

## 2022-12-08 DIAGNOSIS — Z01.818 PRE-TRANSPLANT EVALUATION FOR KIDNEY TRANSPLANT: ICD-10-CM

## 2022-12-08 DIAGNOSIS — N18.5 CHRONIC KIDNEY DISEASE, STAGE 5 (H): ICD-10-CM

## 2022-12-08 LAB
A1 AG RBC QL: NEGATIVE
ABO/RH(D): NORMAL
ALBUMIN MFR UR ELPH: 44.1 MG/DL
ALBUMIN SERPL-MCNC: 3.6 G/DL (ref 3.4–5)
ALBUMIN UR-MCNC: 30 MG/DL
ALP SERPL-CCNC: 62 U/L (ref 40–150)
ALT SERPL W P-5'-P-CCNC: 15 U/L (ref 0–50)
ANION GAP SERPL CALCULATED.3IONS-SCNC: 9 MMOL/L (ref 3–14)
ANTIBODY TITER IGM SCREEN: NEGATIVE
APPEARANCE UR: CLEAR
APTT PPP: 35 SECONDS (ref 22–38)
AST SERPL W P-5'-P-CCNC: 10 U/L (ref 0–45)
B IGG TITR SERPL: 4 {TITER}
B IGM TITR SERPL: 8 {TITER}
BACTERIA #/AREA URNS HPF: ABNORMAL /HPF
BASOPHILS # BLD AUTO: 0 10E3/UL (ref 0–0.2)
BASOPHILS NFR BLD AUTO: 1 %
BILIRUB SERPL-MCNC: 0.3 MG/DL (ref 0.2–1.3)
BILIRUB UR QL STRIP: NEGATIVE
BUN SERPL-MCNC: 52 MG/DL (ref 7–30)
CALCIUM SERPL-MCNC: 9.2 MG/DL (ref 8.5–10.1)
CHLORIDE BLD-SCNC: 104 MMOL/L (ref 94–109)
CO2 SERPL-SCNC: 24 MMOL/L (ref 20–32)
COLOR UR AUTO: YELLOW
CREAT SERPL-MCNC: 3.58 MG/DL (ref 0.52–1.04)
CREAT UR-MCNC: 32.9 MG/DL
EOSINOPHIL # BLD AUTO: 0.2 10E3/UL (ref 0–0.7)
EOSINOPHIL NFR BLD AUTO: 3 %
ERYTHROCYTE [DISTWIDTH] IN BLOOD BY AUTOMATED COUNT: 12.5 % (ref 10–15)
FACTOR 2 INTERPRETATION: NORMAL
FACTOR V INTERPRETATION: NORMAL
GFR SERPL CREATININE-BSD FRML MDRD: 15 ML/MIN/1.73M2
GLUCOSE BLD-MCNC: 99 MG/DL (ref 70–99)
GLUCOSE UR STRIP-MCNC: NEGATIVE MG/DL
HBV CORE AB SERPL QL IA: NONREACTIVE
HBV SURFACE AB SERPL IA-ACNC: 1.26 M[IU]/ML
HBV SURFACE AB SERPL IA-ACNC: NONREACTIVE M[IU]/ML
HBV SURFACE AG SERPL QL IA: NONREACTIVE
HCT VFR BLD AUTO: 32.5 % (ref 35–47)
HCV AB SERPL QL IA: NONREACTIVE
HGB BLD-MCNC: 10.9 G/DL (ref 11.7–15.7)
HGB UR QL STRIP: ABNORMAL
HIV 1+2 AB+HIV1 P24 AG SERPL QL IA: NONREACTIVE
IMM GRANULOCYTES # BLD: 0 10E3/UL
IMM GRANULOCYTES NFR BLD: 0 %
INR PPP: 0.97 (ref 0.85–1.15)
KETONES UR STRIP-MCNC: NEGATIVE MG/DL
LAB DIRECTOR COMMENTS: NORMAL
LAB DIRECTOR DISCLAIMER: NORMAL
LAB DIRECTOR INTERPRETATION: NORMAL
LAB DIRECTOR METHODOLOGY: NORMAL
LAB DIRECTOR RESULTS: NORMAL
LEUKOCYTE ESTERASE UR QL STRIP: ABNORMAL
LYMPHOCYTES # BLD AUTO: 1.9 10E3/UL (ref 0.8–5.3)
LYMPHOCYTES NFR BLD AUTO: 29 %
MCH RBC QN AUTO: 30.7 PG (ref 26.5–33)
MCHC RBC AUTO-ENTMCNC: 33.5 G/DL (ref 31.5–36.5)
MCV RBC AUTO: 92 FL (ref 78–100)
MONOCYTES # BLD AUTO: 0.4 10E3/UL (ref 0–1.3)
MONOCYTES NFR BLD AUTO: 6 %
NEUTROPHILS # BLD AUTO: 3.9 10E3/UL (ref 1.6–8.3)
NEUTROPHILS NFR BLD AUTO: 61 %
NITRATE UR QL: NEGATIVE
PH UR STRIP: 5.5 [PH] (ref 5–7)
PHOSPHATE SERPL-MCNC: 4.5 MG/DL (ref 2.5–4.5)
PLATELET # BLD AUTO: 218 10E3/UL (ref 150–450)
POTASSIUM BLD-SCNC: 3.4 MMOL/L (ref 3.4–5.3)
PROT SERPL-MCNC: 8.3 G/DL (ref 6.8–8.8)
PROT/CREAT 24H UR: 1.34 MG/MG CR (ref 0–0.2)
PTH-INTACT SERPL-MCNC: 44 PG/ML (ref 15–65)
RBC # BLD AUTO: 3.55 10E6/UL (ref 3.8–5.2)
RBC #/AREA URNS AUTO: ABNORMAL /HPF
SODIUM SERPL-SCNC: 137 MMOL/L (ref 133–144)
SP GR UR STRIP: <=1.005 (ref 1–1.03)
SPECIMEN DESCRIPTION: NORMAL
SPECIMEN EXPIRATION DATE: NORMAL
SQUAMOUS #/AREA URNS AUTO: ABNORMAL /LPF
T PALLIDUM AB SER QL: NONREACTIVE
UROBILINOGEN UR STRIP-ACNC: 0.2 E.U./DL
WBC # BLD AUTO: 6.4 10E3/UL (ref 4–11)
WBC #/AREA URNS AUTO: ABNORMAL /HPF
WBC CLUMPS #/AREA URNS HPF: PRESENT /HPF

## 2022-12-08 PROCEDURE — 86147 CARDIOLIPIN ANTIBODY EA IG: CPT | Mod: 59

## 2022-12-08 PROCEDURE — 84100 ASSAY OF PHOSPHORUS: CPT

## 2022-12-08 PROCEDURE — 81001 URINALYSIS AUTO W/SCOPE: CPT

## 2022-12-08 PROCEDURE — 84156 ASSAY OF PROTEIN URINE: CPT

## 2022-12-08 PROCEDURE — 86833 HLA CLASS II HIGH DEFIN QUAL: CPT

## 2022-12-08 PROCEDURE — G0452 MOLECULAR PATHOLOGY INTERPR: HCPCS | Mod: 26 | Performed by: PATHOLOGY

## 2022-12-08 PROCEDURE — 81378 HLA I & II TYPING HR: CPT

## 2022-12-08 PROCEDURE — 86850 RBC ANTIBODY SCREEN: CPT

## 2022-12-08 PROCEDURE — 86704 HEP B CORE ANTIBODY TOTAL: CPT

## 2022-12-08 PROCEDURE — 85025 COMPLETE CBC W/AUTO DIFF WBC: CPT

## 2022-12-08 PROCEDURE — 85670 THROMBIN TIME PLASMA: CPT

## 2022-12-08 PROCEDURE — 87340 HEPATITIS B SURFACE AG IA: CPT

## 2022-12-08 PROCEDURE — 81382 HLA II TYPING 1 LOC HR: CPT

## 2022-12-08 PROCEDURE — 81240 F2 GENE: CPT

## 2022-12-08 PROCEDURE — 86832 HLA CLASS I HIGH DEFIN QUAL: CPT

## 2022-12-08 PROCEDURE — 81241 F5 GENE: CPT

## 2022-12-08 PROCEDURE — 86905 BLOOD TYPING RBC ANTIGENS: CPT

## 2022-12-08 PROCEDURE — 80053 COMPREHEN METABOLIC PANEL: CPT

## 2022-12-08 PROCEDURE — 86803 HEPATITIS C AB TEST: CPT

## 2022-12-08 PROCEDURE — 86780 TREPONEMA PALLIDUM: CPT

## 2022-12-08 PROCEDURE — 86706 HEP B SURFACE ANTIBODY: CPT

## 2022-12-08 PROCEDURE — 83970 ASSAY OF PARATHORMONE: CPT

## 2022-12-08 PROCEDURE — 86644 CMV ANTIBODY: CPT

## 2022-12-08 PROCEDURE — 85613 RUSSELL VIPER VENOM DILUTED: CPT

## 2022-12-08 PROCEDURE — 86481 TB AG RESPONSE T-CELL SUSP: CPT

## 2022-12-08 PROCEDURE — 85730 THROMBOPLASTIN TIME PARTIAL: CPT | Mod: 59

## 2022-12-08 PROCEDURE — 36415 COLL VENOUS BLD VENIPUNCTURE: CPT

## 2022-12-08 PROCEDURE — 86665 EPSTEIN-BARR CAPSID VCA: CPT

## 2022-12-08 PROCEDURE — 86900 BLOOD TYPING SEROLOGIC ABO: CPT

## 2022-12-08 PROCEDURE — 86901 BLOOD TYPING SEROLOGIC RH(D): CPT

## 2022-12-08 PROCEDURE — 85610 PROTHROMBIN TIME: CPT

## 2022-12-08 PROCEDURE — 86787 VARICELLA-ZOSTER ANTIBODY: CPT

## 2022-12-08 PROCEDURE — 86886 COOMBS TEST INDIRECT TITER: CPT | Mod: 59

## 2022-12-08 PROCEDURE — 85390 FIBRINOLYSINS SCREEN I&R: CPT | Performed by: PATHOLOGY

## 2022-12-08 NOTE — TELEPHONE ENCOUNTER
"Pt scheduled for PD catheter surgery on 1/11/23 with Dr. Hagen.    Nephrology RNCC, Valorie tam.    Updated neph tracking flowsheet.    Requested clinic coordinator to schedule corresponding pre-op H&P within 30 days and 2 week post op follow up with Dr. Hagen.    Neph Tracking Flowsheet Last Filled Values     CKD Education Status Deferred    CKD Education Type Dialysis Unit Tour    CKD Education Other Patient has been in touch with Alicia PD nurse at [facility]. Patient feels \"totally informed.\" about PD choice    Preferred Modality Transplant; Peritoneal Dialysis    Patient's Referral Dates Auto Populate Patient's Referral Dates    Journey Referral 10/12/22    Access Surgeon Referral Status  Referred    Dialysis Access Referral 10/12/22  Msg sent to Dialysis access team    Access Surgical Consult 11/08/22  She is a good candidate and is well prepared for starting dialysis. Will contact dialysis access RN when needing access    Diaylsis Access Type PD  Sent to team to schedule PD cath placement    Dialysis Access Surgery 01/11/23  PD catheter placement    Dialysis Access Surgeon Xiao    Transplant Evaluation Referral 10/12/22    Transplant Status  Referred  11/15/22    Initiation of Dialysis --  Patient to get dialysis admission labs week of 12/5/22        KARENA VIZCARRA RN on 12/8/2022 at 3:36 PM  Dialysis Access Care Coordinator  Phone: [phone]  Please use P_Dialysis_Access_Nurse pool for Epic InBask communications to ensure timely response.    "

## 2022-12-09 LAB
CARDIOLIPIN IGG SER IA-ACNC: <2 GPL-U/ML
CARDIOLIPIN IGG SER IA-ACNC: NEGATIVE
CARDIOLIPIN IGM SER IA-ACNC: <2 MPL-U/ML
CARDIOLIPIN IGM SER IA-ACNC: NEGATIVE
CMV IGG SERPL IA-ACNC: 9.2 U/ML
CMV IGG SERPL IA-ACNC: ABNORMAL
DRVVT SCREEN RATIO: 0.93
EBV VCA IGG SER IA-ACNC: >750 U/ML
EBV VCA IGG SER IA-ACNC: POSITIVE
LA PPP-IMP: NEGATIVE
LUPUS INTERPRETATION: ABNORMAL
PLATELET NEUTRALIZATION: -7 SECONDS
PTT 1:2 MIX: 42 SECONDS (ref 31–45)
PTT RATIO: 1.46
THROMBIN TIME: 16.6 SECONDS (ref 13–19)
VZV IGG SER QL IA: 1235 INDEX
VZV IGG SER QL IA: POSITIVE

## 2022-12-10 LAB
QUANTIFERON MITOGEN: 10 IU/ML
QUANTIFERON NIL TUBE: 0.05 IU/ML
QUANTIFERON TB1 TUBE: 0.05 IU/ML
QUANTIFERON TB2 TUBE: 0.06

## 2022-12-11 LAB
GAMMA INTERFERON BACKGROUND BLD IA-ACNC: 0.05 IU/ML
M TB IFN-G BLD-IMP: NEGATIVE
M TB IFN-G CD4+ BCKGRND COR BLD-ACNC: 9.95 IU/ML
MITOGEN IGNF BCKGRD COR BLD-ACNC: 0 IU/ML
MITOGEN IGNF BCKGRD COR BLD-ACNC: 0.01 IU/ML

## 2022-12-14 ENCOUNTER — HOSPITAL ENCOUNTER (OUTPATIENT)
Facility: AMBULATORY SURGERY CENTER | Age: 52
End: 2022-12-14
Attending: SURGERY
Payer: COMMERCIAL

## 2022-12-14 ENCOUNTER — TELEPHONE (OUTPATIENT)
Dept: GASTROENTEROLOGY | Facility: CLINIC | Age: 52
End: 2022-12-14

## 2022-12-14 ENCOUNTER — MYC MEDICAL ADVICE (OUTPATIENT)
Dept: FAMILY MEDICINE | Facility: CLINIC | Age: 52
End: 2022-12-14

## 2022-12-14 LAB
A*: NORMAL
A*LOCUS SEROLOGIC EQUIVALENT: 1
A*LOCUS: NORMAL
A*SEROLOGIC EQUIVALENT: 29
ABTEST METHOD: NORMAL
B*: NORMAL
B*LOCUS SEROLOGIC EQUIVALENT: 8
B*LOCUS: NORMAL
B*SEROLOGIC EQUIVALENT: 65
BW-1: NORMAL
C*: NORMAL
C*LOCUS SEROLOGIC EQUIVALENT: 7
C*LOCUS: NORMAL
C*SEROLOGIC EQUIVALENT: 8
DPA1*: NORMAL
DPA1*LOCUS: NORMAL
DPB1*: NORMAL
DPB1*LOCUS: NORMAL
DQA1*LOCUS: NORMAL
DQB1*LOCUS SEROLOGIC EQUIVALENT: 2
DQB1*LOCUS: NORMAL
DRB1*LOCUS SEROLOGIC EQUIVALENT: 17
DRB1*LOCUS: NORMAL
DRB3*: NORMAL
DRB3*LOCUS NMDP: NORMAL
DRB3*LOCUS SEROLOGIC EQUIVALENT: 52
DRB3*LOCUS: NORMAL
DRB3*NMDP: NORMAL
DRB3*SEROLOGIC EQUIVALENT: 52
DRSSO TEST METHOD: NORMAL
PROTOCOL CUTOFF: NORMAL
SA 1 CELL: NORMAL
SA 1 TEST METHOD: NORMAL
SA 2 CELL: NORMAL
SA 2 TEST METHOD: NORMAL
SA1 HI RISK ABY: NORMAL
SA1 MOD RISK ABY: NORMAL
SA2 HI RISK ABY: NORMAL
SA2 MOD RISK ABY: NORMAL
UNACCEPTABLE ANTIGENS: NORMAL
UNOS CPRA: 97
ZZZSA 1  COMMENTS: NORMAL
ZZZSA 2 COMMENTS: NORMAL

## 2022-12-14 NOTE — TELEPHONE ENCOUNTER
Screening Questions  BLUE  KIND OF PREP RED  LOCATION [review exclusion criteria] GREEN  SEDATION TYPE        Y Are you active on mychart?       Tianna Vieyra, RYDER CNP  Ordering/Referring Provider?        MEDICA What type of coverage do you have?      N Have you had a positive covid test in the last 14 days?     23.9 1. BMI  [BMI 40+ - review exclusion criteria]    Y  2. Are you able to give consent for your medical care? [IF NO,RN REVIEW]        N  3. Are you taking any prescription pain medications on a routine schedule?        3a. EXTENDED PREP What kind of prescription?     N 4. Do you have any chemical dependencies such as alcohol, street drugs, or methadone?    N 5. Do you have any history of post-traumatic stress syndrome, severe anxiety or history of psychosis?      **If yes 3- 5 , please schedule with MAC sedation.**          IF YES TO ANY 6 - 10 - HOSPITAL SETTING ONLY.     N 6.   Do you need assistance transferring?     N 7.   Have you had a heart or lung transplant?    N 8.   Are you currently on dialysis?   N 9.   Do you use daily home oxygen?   N 10. Do you take nitroglycerin?   10a.  If yes, how often?     11. [FEMALES]  N Are you currently pregnant?    11a.  If yes, how many weeks? [ Greater than 12 weeks, OR NEEDED]    N 12. Do you have Pulmonary Hypertension? *NEED PAC APPT AT [facility]*     N 13. [review exclusion criteria]  Do you have any implantable devices in your body (pacemaker, defib, LVAD)?    N 14. In the past 6 months, have you had any heart related issues including cardiomyopathy or heart attack?     14a.  If yes, did it require cardiac stenting if so when?     N 15. Have you had a stroke or Transient ischemic attack (TIA - aka  mini stroke ) within 6 months?      N 16. Do you have mod to severe Obstructive Sleep Apnea?  [Hospital only - Ok at [place]]    N 17. Do you have SEVERE AND UNCONTROLLED asthma? *NEED PAC APPT AT [facility]*     N 18. Are you currently taking any blood  "thinners?     18a. If yes, inform patient to \"follow up w/ ordering provider for bridging instructions.\"    N 19. Do you take the medication Phentermine?    19a. If yes, \"Hold for 7 days before procedure.  Please consult your prescribing provider if you have questions about holding this medication.\"     Y  20. Do you have chronic kidney disease?      N  21. Do you have a diagnosis of diabetes?     N  22. On a regular basis do you go 3-5 days between bowel movements?      23. Preferred LOCAL Pharmacy for Pre Prescription    [ LIST ONLY ONE PHARMACY]        [id number] - [place], [place] - [address] AT [facility]        - CLOSING REMINDERS -    Informed patient they will need an adult    Cannot take any type of public or medical transportation alone    Conscious Sedation- Needs  for 6 hours after the procedure       MAC/General-Needs  for 24 hours after procedure    Pre-Procedure Covid test to be completed [[facility] PCR Testing Required]    Confirmed Nurse will call to complete assessment       - SCHEDULING DETAILS -  No Hospital Setting Required? If yes, what is the exclusion?: n/a   Lux  Surgeon    02/07/23  Date of Procedure  Lower Endoscopy [Colonoscopy]  Type of Procedure Scheduled  [facility] Surgery [facility]   STANDARD [facility]-If you answer yes to questions #8, #20, #21Which Colonoscopy Prep was Sent?     Moderate Sedation Type     N PAC / Pre-op Required                 "

## 2022-12-19 ENCOUNTER — OFFICE VISIT (OUTPATIENT)
Dept: FAMILY MEDICINE | Facility: CLINIC | Age: 52
End: 2022-12-19
Payer: COMMERCIAL

## 2022-12-19 VITALS
BODY MASS INDEX: 24.19 KG/M2 | TEMPERATURE: 97.5 F | RESPIRATION RATE: 11 BRPM | OXYGEN SATURATION: 96 % | HEIGHT: 64 IN | WEIGHT: 141.7 LBS | HEART RATE: 70 BPM | DIASTOLIC BLOOD PRESSURE: 78 MMHG | SYSTOLIC BLOOD PRESSURE: 126 MMHG

## 2022-12-19 DIAGNOSIS — Z23 HIGH PRIORITY FOR 2019-NCOV VACCINE: ICD-10-CM

## 2022-12-19 DIAGNOSIS — Z12.4 CERVICAL CANCER SCREENING: ICD-10-CM

## 2022-12-19 DIAGNOSIS — Z01.818 PREOP GENERAL PHYSICAL EXAM: ICD-10-CM

## 2022-12-19 DIAGNOSIS — Z00.00 ROUTINE GENERAL MEDICAL EXAMINATION AT A HEALTH CARE FACILITY: Primary | ICD-10-CM

## 2022-12-19 DIAGNOSIS — I10 HYPERTENSION GOAL BP (BLOOD PRESSURE) < 130/80: ICD-10-CM

## 2022-12-19 DIAGNOSIS — F51.04 PSYCHOPHYSIOLOGICAL INSOMNIA: ICD-10-CM

## 2022-12-19 DIAGNOSIS — Z23 NEED FOR STREPTOCOCCUS PNEUMONIAE VACCINATION: ICD-10-CM

## 2022-12-19 DIAGNOSIS — Z13.220 SCREENING FOR HYPERLIPIDEMIA: ICD-10-CM

## 2022-12-19 DIAGNOSIS — Z23 NEED FOR PROPHYLACTIC VACCINATION AND INOCULATION AGAINST INFLUENZA: ICD-10-CM

## 2022-12-19 DIAGNOSIS — F43.21 SITUATIONAL DEPRESSION: ICD-10-CM

## 2022-12-19 DIAGNOSIS — Z12.11 SCREEN FOR COLON CANCER: ICD-10-CM

## 2022-12-19 DIAGNOSIS — E03.9 ACQUIRED HYPOTHYROIDISM: ICD-10-CM

## 2022-12-19 DIAGNOSIS — N18.5 CHRONIC KIDNEY DISEASE, STAGE 5 (H): ICD-10-CM

## 2022-12-19 PROCEDURE — G0145 SCR C/V CYTO,THINLAYER,RESCR: HCPCS | Performed by: NURSE PRACTITIONER

## 2022-12-19 PROCEDURE — 99214 OFFICE O/P EST MOD 30 MIN: CPT | Mod: 25 | Performed by: NURSE PRACTITIONER

## 2022-12-19 PROCEDURE — 90472 IMMUNIZATION ADMIN EACH ADD: CPT | Performed by: NURSE PRACTITIONER

## 2022-12-19 PROCEDURE — 0051A COVID-19 VACCINE 12+ (PFIZER): CPT | Performed by: NURSE PRACTITIONER

## 2022-12-19 PROCEDURE — 90682 RIV4 VACC RECOMBINANT DNA IM: CPT | Performed by: NURSE PRACTITIONER

## 2022-12-19 PROCEDURE — 91305 COVID-19 VACCINE 12+ (PFIZER): CPT | Performed by: NURSE PRACTITIONER

## 2022-12-19 PROCEDURE — 90471 IMMUNIZATION ADMIN: CPT | Performed by: NURSE PRACTITIONER

## 2022-12-19 PROCEDURE — 99396 PREV VISIT EST AGE 40-64: CPT | Mod: 25 | Performed by: NURSE PRACTITIONER

## 2022-12-19 PROCEDURE — 87624 HPV HI-RISK TYP POOLED RSLT: CPT | Performed by: NURSE PRACTITIONER

## 2022-12-19 PROCEDURE — 90677 PCV20 VACCINE IM: CPT | Performed by: NURSE PRACTITIONER

## 2022-12-19 RX ORDER — TRAZODONE HYDROCHLORIDE 50 MG/1
50 TABLET, FILM COATED ORAL AT BEDTIME
Qty: 30 TABLET | Refills: 1 | Status: SHIPPED | OUTPATIENT
Start: 2022-12-19 | End: 2023-02-20

## 2022-12-19 ASSESSMENT — ENCOUNTER SYMPTOMS
FEVER: 0
DYSURIA: 0
NERVOUS/ANXIOUS: 1
FREQUENCY: 0
EYE PAIN: 0
HEADACHES: 1
DIZZINESS: 1
CHILLS: 1
SORE THROAT: 0
HEMATURIA: 0
COUGH: 0
ARTHRALGIAS: 0
MYALGIAS: 0
HEARTBURN: 1
CONSTIPATION: 1
PARESTHESIAS: 0
NAUSEA: 1
WEAKNESS: 1
ABDOMINAL PAIN: 0
JOINT SWELLING: 0
SHORTNESS OF BREATH: 0
HEMATOCHEZIA: 0
DIARRHEA: 0
PALPITATIONS: 0

## 2022-12-19 ASSESSMENT — PAIN SCALES - GENERAL: PAINLEVEL: NO PAIN (0)

## 2022-12-19 NOTE — NURSING NOTE
Prior to immunization administration, verified patients identity using patient s name and date of birth. Please see Immunization Activity for additional information.     Screening Questionnaire for Adult Immunization    Are you sick today?   No   Do you have allergies to medications, food, a vaccine component or latex?   No   Have you ever had a serious reaction after receiving a vaccination?   No   Do you have a long-term health problem with heart, lung, kidney, or metabolic disease (e.g., diabetes), asthma, a blood disorder, no spleen, complement component deficiency, a cochlear implant, or a spinal fluid leak?  Are you on long-term aspirin therapy?   yes   Do you have cancer, leukemia, HIV/AIDS, or any other immune system problem?   No   Do you have a parent, brother, or sister with an immune system problem?   No   In the past 3 months, have you taken medications that affect  your immune system, such as prednisone, other steroids, or anticancer drugs; drugs for the treatment of rheumatoid arthritis, Crohn s disease, or psoriasis; or have you had radiation treatments?   No   Have you had a seizure, or a brain or other nervous system problem?   No   During the past year, have you received a transfusion of blood or blood    products, or been given immune (gamma) globulin or antiviral drug?   No   For women: Are you pregnant or is there a chance you could become       pregnant during the next month?   No   Have you received any vaccinations in the past 4 weeks?   No     Immunization questionnaire was positive for at least one answer.  Notified Tessa Vieyra.        Per orders of Tessa Vieyra, injection of Pneum 20, COVID #1, and Flu given by Helen Espinoza CMA. Patient instructed to remain in clinic for 15 minutes afterwards, and to report any adverse reaction to me immediately.       Screening performed by Helen Espinoza CMA on 12/19/2022 at 8:15 AM.    
No

## 2022-12-19 NOTE — PROGRESS NOTES
[address] [address]  Phone: [phone]  Primary Provider: Ivana Vieyra  Pre-op Performing Provider: IVANA VIEYRA      PREOPERATIVE EVALUATION:  Today's date: 12/19/2022    Sheri Joyce is a 52 year old female who presents for a preoperative evaluation.    Surgical Information:  Surgery/Procedure: INSERTION, CATHETER, DIALYSIS, PERITONEAL, LAPAROSCOPIC  Surgery Location: [facility]  Surgeon: Dr. Hagen  Surgery Date: 01/11/23  Time of Surgery: 1:00pm  Where patient plans to recover: At home with family  Fax number for surgical facility: Note does not need to be faxed, will be available electronically in Epic.    Type of Anesthesia Anticipated: General        Subjective     HPI related to upcoming procedure: Chronic renal disease now requiring peritoneal dialysis       Preop Questions 12/19/2022   1. Have you ever had a heart attack or stroke? No   2. Have you ever had surgery on your heart or blood vessels, such as a stent placement, a coronary artery bypass, or surgery on an artery in your head, neck, heart, or legs? No   3. Do you have chest pain with activity? No   4. Do you have a history of  heart failure? No   5. Do you currently have a cold, bronchitis or symptoms of other infection? No   6. Do you have a cough, shortness of breath, or wheezing? No   7. Do you or anyone in your family have previous history of blood clots? No   8. Do you or does anyone in your family have a serious bleeding problem such as prolonged bleeding following surgeries or cuts? No   9. Have you ever had problems with anemia or been told to take iron pills? UNKNOWN -    10. Have you had any abnormal blood loss such as black, tarry or bloody stools, or abnormal vaginal bleeding? No   11. Have you ever had a blood transfusion? No   12. Are you willing to have a blood transfusion if it is medically needed  before, during, or after your surgery? Yes   13. Have you or any of your relatives ever had problems with anesthesia? No   14. Do you have sleep apnea, excessive snoring or daytime drowsiness? No   15. Do you have any artifical heart valves or other implanted medical devices like a pacemaker, defibrillator, or continuous glucose monitor? No   16. Do you have artificial joints? No   17. Are you allergic to latex? No   18. Is there any chance that you may be pregnant? No     Health Care Directive:  Patient does not have a Health Care Directive or Living Will: Discussed advance care planning with patient; however, patient declined at this time.    Preoperative Review of :   reviewed - no record of controlled substances prescribed.      Status of Chronic Conditions:  See problem list for active medical problems.  Problems all longstanding and stable, except as noted/documented.  See ROS for pertinent symptoms related to these conditions.    HYPERTENSION - Patient has longstanding history of HTN , currently denies any symptoms referable to elevated blood pressure. Specifically denies chest pain, palpitations, dyspnea, orthopnea, PND or peripheral edema. Blood pressure readings have been in normal range. Current medication regimen is as listed below. Patient denies any side effects of medication.     RENAL INSUFFICIENCY - Patient has a longstanding history of moderate-severe chronic renal insufficiency. Last Cr   Creatinine   Date Value Ref Range Status   12/08/2022 3.58 (H) 0.52 - 1.04 mg/dL Final   01/08/2020 1.69 (H) 0.52 - 1.04 mg/dL Final     .       Review of Systems  CONSTITUTIONAL: NEGATIVE for fever, chills, change in weight  INTEGUMENTARY/SKIN: NEGATIVE for changing lesion  and non-healing lesion   EYES: NEGATIVE for vision changes or irritation  ENT/MOUTH: NEGATIVE for ear, mouth and throat problems  RESP: NEGATIVE for significant cough or SOB  CV: NEGATIVE for chest pain, palpitations or peripheral  edema  GI: POSITIVE for constipation and nausea and NEGATIVE for melena and vomiting  : NEGATIVE for frequency, dysuria, or hematuria  MUSCULOSKELETAL: NEGATIVE for significant arthralgias or myalgia  NEURO: NEGATIVE for weakness, dizziness or paresthesias  ENDOCRINE: NEGATIVE for temperature intolerance, skin/hair changes  HEME: NEGATIVE for bleeding problems  PSYCHIATRIC: NEGATIVE for changes in mood or affect    Patient Active Problem List    Diagnosis Date Noted     Former tobacco use 11/15/2022     Priority: Medium     Anxiety 11/15/2022     Priority: Medium     Depression 11/15/2022     Priority: Medium     Chronic kidney disease, stage 5 (H) 10/26/2015     Priority: Medium     Hypertension goal BP (blood pressure) < 130/80 10/17/2011     Priority: Medium     CARDIOVASCULAR SCREENING; LDL GOAL LESS THAN 100 05/09/2010     Priority: Medium     Family history of malignant neoplasm of breast 06/02/2006     Priority: Medium     Polycystic kidney 05/19/2005     Priority: Medium     Sees Nephrology, to have Creatinine monitored every 6 months.    Patient is followed by TRISTON SIMMONS for ongoing prescription of narcotic pain medicine.  Med: Vicodin   Maximum use per month: 10  Expected duration: indefinite  Narcotic agreement on file: NO  Clinic visit recommended: Q 3 months    Pt is taking this as needed for PKD.  Uses very infrequently.  Okay for refill of Vicodin 60 every 6-9 months.  Problem list name updated by automated process. Provider to review       Acquired hypothyroidism 02/28/2005     Priority: Medium     Problem list name updated by automated process. Provider to review        Past Medical History:   Diagnosis Date     Anxiety      Chronic kidney disease      Contraception 03/19/2009     Depression      Former tobacco use      Hypertension      Liver disease      PKD (polycystic kidney disease)      Polycystic kidney, unspecified type      Thyroid disease      Varicosities      Past Surgical  History:   Procedure Laterality Date     LAPAROSCOPIC DECORTICATION CYST RENAL  2006     MYRINGOTOMY, INSERT TUBE(S), ADENOIDECTOMY, COMBINED       PE TUBES  age 13     SURGICAL HISTORY OF -   2005    kidney surgery for cyst removal     Current Outpatient Medications   Medication Sig Dispense Refill     amLODIPine (NORVASC) 10 MG tablet Take 1 tablet (10 mg) by mouth daily 90 tablet 3     cyclobenzaprine (FLEXERIL) 10 MG tablet Take 1 tablet (10 mg) by mouth every evening as needed 30 tablet 3     gabapentin (NEURONTIN) 100 MG capsule Take 1 capsule (100 mg) by mouth At Bedtime 30 capsule 0     lisinopril (ZESTRIL) 40 MG tablet Take 1 tablet (40 mg) by mouth daily 90 tablet 1     sennosides (SENOKOT) 8.6 MG tablet Take 1 tablet by mouth daily as needed for constipation       traZODone (DESYREL) 50 MG tablet Take 1 tablet (50 mg) by mouth At Bedtime 30 tablet 1     varenicline (CHANTIX) 1 MG tablet Take 1 tablet (1 mg) by mouth 2 times daily 60 tablet 2       Allergies   Allergen Reactions     Bactrim [Sulfamethoxazole W/Trimethoprim] Itching     Seasonal Allergies      Pcn [Penicillin G]         Social History     Tobacco Use     Smoking status: Former     Packs/day: 0.50     Years: 10.00     Pack years: 5.00     Types: Cigarettes     Quit date: 10/17/2022     Years since quittin.1     Smokeless tobacco: Never   Substance Use Topics     Alcohol use: Yes     Comment: Very occasionally     Family History   Problem Relation Age of Onset     Lung Cancer Mother         lung     Hypertension Father      Breast Cancer Maternal Aunt      Breast Cancer Cousin      Breast Cancer Cousin      Cerebrovascular Disease Paternal Aunt 70     Diabetes No family hx of      C.A.D. No family hx of      Cancer - colorectal No family hx of      Prostate Cancer No family hx of      History   Drug Use     Types: Marijuana     Comment: Smokes once daily to induce appetite.         Objective     /78 (BP Location: Right  "arm, Patient Position: Sitting, Cuff Size: Adult Regular)   Pulse 70   Temp 97.5  F (36.4  C) (Oral)   Resp 11   Ht 1.617 m (5' 3.66\")   Wt 64.3 kg (141 lb 11.2 oz)   LMP  (LMP Unknown)   SpO2 96%   BMI 24.58 kg/m      Physical Exam    GENERAL APPEARANCE: healthy, alert and no distress     EYES: Eyes grossly normal to inspection and conjunctivae and sclerae normal     HENT: ear canals and TM's normal and nose and mouth without ulcers or lesions     NECK: no adenopathy, no asymmetry, masses, or scars and thyroid normal to palpation     RESP: lungs clear to auscultation - no rales, rhonchi or wheezes     CV: regular rates and rhythm, no murmur, click or rub and no irregular beats     ABDOMEN:  soft, nontender, no HSM or masses and bowel sounds normal     MS: extremities normal- no gross deformities noted, no evidence of inflammation in joints, FROM in all extremities.     SKIN: no suspicious lesions or rashes     NEURO: Normal strength and tone, sensory exam grossly normal, mentation intact and speech normal     PSYCH: mentation appears normal. and affect normal/bright     LYMPHATICS: No cervical adenopathy    Recent Labs   Lab Test 12/08/22  0910 11/30/22  0817 10/25/22  1008 10/12/22  1126   HGB 10.9* 11.3*  --  12.3     --   --  228   INR 0.97  --   --   --     137   < > 139   POTASSIUM 3.4 3.7   < > 4.0   CR 3.58* 3.75*   < > 3.91*    < > = values in this interval not displayed.        Diagnostics:  Recent Results (from the past 168 hour(s))   Pap screen with HPV - recommended age 30 - 65 years    Collection Time: 12/19/22  7:30 AM   Result Value Ref Range    Interpretation        Negative for Intraepithelial Lesion or Malignancy (NILM)    Comment         Papanicolaou Test Limitations:  Cervical cytology is a screening test with limited sensitivity, and regular screening is critical for cancer prevention.  Pap tests are primarily effective for the diagnosis/prevention of squamous cell " carcinoma, not adenocarcinoma or other cancers.        Specimen Adequacy       Satisfactory for evaluation, endocervical/transformation zone component present    Clinical Information       post-menopausal      Reflex Testing Yes regardless of result     Previous Abnormal?       No      Performing Labs       The technical component of this testing was completed at [facility]     HPV High Risk Types DNA Cervical    Collection Time: 12/19/22  7:30 AM   Result Value Ref Range    Other HR HPV Negative Negative    HPV16 DNA Negative Negative    HPV18 DNA Negative Negative    FINAL DIAGNOSIS       This patient's sample is negative for HPV DNA.        This test was developed and its performance characteristics determined by the [facility]. It has not been cleared or approved by the FDA. The laboratory is regulated under CLIA as qualified to perform high-complexity testing. This test is used for clinical purposes. It should not be regarded as investigational or for research.    METHODOLOGY: The Roche Paul 4800 system uses automated extraction, simultaneous amplification of HPV (L1 region) and beta-globin, followed by real time detection of fluorescent labeled HPV and beta globin using specific oligonucleotide probes. The test specifically identifies types HPV 16 DNA and HPV 18 DNA while concurrently detecting the rest of the high risk types (31, 33, 35, 39, 45, 51, 52, 56, 58, 59, 66 or 68).    COMMENTS: This test is not intended for use as a screening device for woman under age 30 with normal cervical cytology. Results should be correlated with cytologic and histologic findings. Close clinical followup is recommended.          EKG: has appointment scheduled with cardiology for EKG and echo next week      Revised Cardiac Risk Index (RCRI):  The patient has the following serious cardiovascular risks for  perioperative complications:   - Serum Creatinine >2.0 mg/dl = 1 point     RCRI Interpretation: 1 point: Class II (low risk - 0.9% complication rate)    Assessment & Plan     The proposed surgical procedure is considered INTERMEDIATE risk.    Routine general medical examination at a health care facility  - REVIEW OF HEALTH MAINTENANCE PROTOCOL ORDERS    Preop general physical exam      Hypertension goal BP (blood pressure) < 130/80  Continue current medications as prescribed.       Chronic kidney disease, stage 5 (H)  Plan for peritoneal dialysis   FOLLOW UP WITH SPECIALIST :Nephrology      Acquired hypothyroidism  - TSH  - T4 free  - T4 free  - TSH    Screen for colon cancer  - Lipid panel reflex to direct LDL Fasting    Screening for hyperlipidemia  - Lipid panel reflex to direct LDL Non-fasting    Cervical cancer screening  - Pap screen with HPV - recommended age 30 - 65 years  - HPV Hold (Lab Only)  - HPV High Risk Types DNA Cervical    Need for prophylactic vaccination and inoculation against influenza  - INFLUENZA QUAD, RECOMBINANT, P-FREE (RIV4) (FLUBLOK)    High priority for 2019-nCoV vaccine  - COVID-19,PF,PFIZER (12+ Yrs Primary Series-GRAY LABEL)    Need for Streptococcus pneumoniae vaccination  - Pneumococcal 20 Valent Conjugate (Prevnar 20)    Situational depression  Will start   - traZODone (DESYREL) 50 MG tablet  Dispense: 30 tablet; Refill: 1  .  Psychophysiological insomnia  Will Start:  - traZODone (DESYREL) 50 MG tablet  Dispense: 30 tablet; Refill: 1    Risks and Recommendations:  The patient has the following additional risks and recommendations for perioperative complications:   - Consult Hospitalist / IM to assist with post-op medical management    Medication Instructions:  Before your surgery:  10 days before: stop all over the counter supplements  1 week before: stop aspirin if you are taking aspirin.   stop ibuprofen, naproxen or similar antiinflammatory medications. You may use Tylenol for  pain or headache.     On the day of your surgery:  Do not take any medication the morning of your surgery.     After surgery:    You may resume all your medications after the surgery unless your surgeon instructs you otherwise  RECOMMENDATION:  APPROVAL GIVEN to proceed with proposed procedure, without further diagnostic evaluation.     Time spent doing chart review, history and exam, documentation and further activities per the note         Signed Electronically by: RYDER Green CNP  Copy of this evaluation report is provided to requesting physician.

## 2022-12-19 NOTE — Clinical Note
Afia Davis  I am going to place Sheri on Trazodone  I looked this up and should be OK with her renal function but wanted to run it by you as well Thank you  Tianna Vieyra NP, APRN CNP

## 2022-12-19 NOTE — PROGRESS NOTES
Answers for HPI/ROS submitted by the patient on 12/19/2022  Frequency of exercise:: 2-3 days/week  Getting at least 3 servings of Calcium per day:: NO  Diet:: Low salt  Taking medications regularly:: Yes  Medication side effects:: Other  Bi-annual eye exam:: NO  Dental care twice a year:: NO  Sleep apnea or symptoms of sleep apnea:: None  abdominal pain: No  Blood in stool: No  Blood in urine: No  chest pain: No  chills: Yes  congestion: No  constipation: Yes  cough: No  diarrhea: No  dizziness: Yes  ear pain: No  eye pain: No  nervous/anxious: Yes  fever: No  frequency: No  genital sores: No  headaches: Yes  hearing loss: Yes  heartburn: Yes  arthralgias: No  joint swelling: No  peripheral edema: No  mood changes: Yes  myalgias: No  nausea: Yes  dysuria: No  palpitations: No  Skin sensation changes: No  sore throat: No  urgency: No  rash: No  shortness of breath: No  visual disturbance: No  weakness: Yes  pelvic pain: No  vaginal bleeding: No  vaginal discharge: No  tenderness: No  breast discharge: No  Additional concerns today:: No  Duration of exercise:: 15-30 minutes       SUBJECTIVE:   CC: Sheri is an 52 year old who presents for preventive health visit.     Patient has been advised of split billing requirements and indicates understanding: Yes  Healthy Habits:     Getting at least 3 servings of Calcium per day:  NO    Bi-annual eye exam:  NO    Dental care twice a year:  NO    Sleep apnea or symptoms of sleep apnea:  None    Diet:  Low salt    Frequency of exercise:  2-3 days/week    Duration of exercise:  15-30 minutes    Taking medications regularly:  Yes    Medication side effects:  Other    PHQ-2 Total Score: 2    Additional concerns today:  No      Today's PHQ-2 Score:   PHQ-2 ( 1999 [employer]) 12/19/2022   Q1: Little interest or pleasure in doing things 1   Q2: Feeling down, depressed or hopeless 1   PHQ-2 Score 2   PHQ-2 Total Score (12-17 Years)- Positive if 3 or more points; Administer PHQ-A if  positive -   Q1: Little interest or pleasure in doing things Several days   Q2: Feeling down, depressed or hopeless Several days   PHQ-2 Score 2       Have you ever done Advance Care Planning? (For example, a Health Directive, POLST, or a discussion with a medical provider or your loved ones about your wishes): Yes, patient states has an Advance Care Planning document and will bring a copy to the clinic.    Social History     Tobacco Use     Smoking status: Former     Packs/day: 0.50     Years: 10.00     Pack years: 5.00     Types: Cigarettes     Quit date: 10/17/2022     Years since quittin.1     Smokeless tobacco: Never   Substance Use Topics     Alcohol use: Yes     Comment: Very occasionally         Alcohol Use 2022   Prescreen: >3 drinks/day or >7 drinks/week? No   Prescreen: >3 drinks/day or >7 drinks/week? -   AUDIT SCORE  -       Reviewed orders with patient.  Reviewed health maintenance and updated orders accordingly - Yes  Lab work is in process  Labs reviewed in EPIC  BP Readings from Last 3 Encounters:   22 126/78   22 (!) 144/87   10/12/22 (!) 171/106    Wt Readings from Last 3 Encounters:   22 64.3 kg (141 lb 11.2 oz)   22 59.9 kg (132 lb 1.6 oz)   10/18/22 60.3 kg (133 lb)                  Patient Active Problem List   Diagnosis     Acquired hypothyroidism     Polycystic kidney     Family history of malignant neoplasm of breast     CARDIOVASCULAR SCREENING; LDL GOAL LESS THAN 100     Hypertension goal BP (blood pressure) < 130/80     Chronic kidney disease, stage 5 (H)     Former tobacco use     Anxiety     Depression     Past Surgical History:   Procedure Laterality Date     LAPAROSCOPIC DECORTICATION CYST RENAL  2006     MYRINGOTOMY, INSERT TUBE(S), ADENOIDECTOMY, COMBINED       PE TUBES  age 13     SURGICAL HISTORY OF -   2005    kidney surgery for cyst removal       Social History     Tobacco Use     Smoking status: Former     Packs/day: 0.50      Years: 10.00     Pack years: 5.00     Types: Cigarettes     Quit date: 10/17/2022     Years since quittin.1     Smokeless tobacco: Never   Substance Use Topics     Alcohol use: Yes     Comment: Very occasionally     Family History   Problem Relation Age of Onset     Lung Cancer Mother         lung     Hypertension Father      Breast Cancer Maternal Aunt      Breast Cancer Cousin      Breast Cancer Cousin      Cerebrovascular Disease Paternal Aunt 70     Diabetes No family hx of      C.A.D. No family hx of      Cancer - colorectal No family hx of      Prostate Cancer No family hx of          Current Outpatient Medications   Medication Sig Dispense Refill     amLODIPine (NORVASC) 10 MG tablet Take 1 tablet (10 mg) by mouth daily 90 tablet 3     cyclobenzaprine (FLEXERIL) 10 MG tablet Take 1 tablet (10 mg) by mouth every evening as needed 30 tablet 3     gabapentin (NEURONTIN) 100 MG capsule Take 1 capsule (100 mg) by mouth At Bedtime 30 capsule 0     lisinopril (ZESTRIL) 40 MG tablet Take 1 tablet (40 mg) by mouth daily 90 tablet 1     sennosides (SENOKOT) 8.6 MG tablet Take 1 tablet by mouth daily as needed for constipation       traZODone (DESYREL) 50 MG tablet Take 1 tablet (50 mg) by mouth At Bedtime 30 tablet 1     varenicline (CHANTIX) 1 MG tablet Take 1 tablet (1 mg) by mouth 2 times daily 60 tablet 2     Allergies   Allergen Reactions     Bactrim [Sulfamethoxazole W/Trimethoprim] Itching     Seasonal Allergies      Pcn [Penicillin G]        Breast Cancer Screening:    FHS-7:   Breast CA Risk Assessment (S-7) 2022   Did any of your first-degree relatives have breast or ovarian cancer? No Yes   Did any of your relatives have bilateral breast cancer? No Yes   Did any man in your family have breast cancer? No No   Did any woman in your family have breast and ovarian cancer? No Yes   Did any woman in your family have breast cancer before age 50 y? Yes Yes   Do you have 2 or more relatives  with breast and/or ovarian cancer? No Yes   Do you have 2 or more relatives with breast and/or bowel cancer? No No       Mammogram Screening: Recommended annual mammography  Pertinent mammograms are reviewed under the imaging tab.    History of abnormal Pap smear: NO - age 30- 65 PAP every 3 years recommended  PAP / HPV Latest Ref Rng & Units 12/19/2022 6/27/2018 3/21/2014   PAP   Negative for Intraepithelial Lesion or Malignancy (NILM) - -   PAP (Historical) - - NIL NIL   HPV16 Negative Negative Negative -   HPV18 Negative Negative Negative -   HRHPV Negative Negative Negative -     Reviewed and updated as needed this visit by clinical staff      Problems             Reviewed and updated as needed this visit by Provider      Problems            Past Medical History:   Diagnosis Date     Anxiety      Chronic kidney disease      Contraception 03/19/2009     Depression      Former tobacco use      Hypertension      Liver disease      PKD (polycystic kidney disease)      Polycystic kidney, unspecified type      Thyroid disease      Varicosities       Past Surgical History:   Procedure Laterality Date     LAPAROSCOPIC DECORTICATION CYST RENAL  01/01/2006     MYRINGOTOMY, INSERT TUBE(S), ADENOIDECTOMY, COMBINED       PE TUBES  age 13     SURGICAL HISTORY OF -   11/01/2005    kidney surgery for cyst removal       Review of Systems   Constitutional: Positive for chills. Negative for fever.        Always had chills for years    HENT: Positive for hearing loss. Negative for congestion, ear pain and sore throat.         Has has chronic issues with her ears for years since childhood    Eyes: Negative for pain and visual disturbance.   Respiratory: Negative for cough and shortness of breath.    Cardiovascular: Negative for chest pain, palpitations and peripheral edema.        Constipation when started with new blood pressure med and takes one stool softener a day and uses Miralax about 3 times a week     Has had reflux  "intermittently for years not constant and depends of what she eats    Gastrointestinal: Positive for constipation, heartburn and nausea. Negative for abdominal pain, diarrhea and hematochezia.        Nausea has been worse with renal disease and uses Marijuanna daily for this    Breasts:  Negative for tenderness and discharge.   Genitourinary: Negative for dysuria, frequency, genital sores, hematuria, pelvic pain, urgency, vaginal bleeding and vaginal discharge.        Has issues with her kidneys for years and then when 29 years old was found out had the chronic kidney disease   Musculoskeletal: Negative for arthralgias, joint swelling and myalgias.   Skin: Negative for rash.   Neurological: Positive for dizziness, weakness and headaches. Negative for paresthesias.        Dizziness with kidney disease    Psychiatric/Behavioral: Positive for mood changes. The patient is nervous/anxious.         Is terrified she states due to her kidney disease and potential pending kidney transplant         OBJECTIVE:   /78 (BP Location: Right arm, Patient Position: Sitting, Cuff Size: Adult Regular)   Pulse 70   Temp 97.5  F (36.4  C) (Oral)   Resp 11   Ht 1.617 m (5' 3.66\")   Wt 64.3 kg (141 lb 11.2 oz)   LMP  (LMP Unknown)   SpO2 96%   BMI 24.58 kg/m    Physical Exam  GENERAL APPEARANCE: healthy, alert and no distress  EYES: Eyes grossly normal to inspection and conjunctivae and sclerae normal  HENT: ear canals and TM's normal, nose and mouth without ulcers or lesions, oropharynx clear and oral mucous membranes moist  NECK: no adenopathy, no asymmetry, masses, or scars and thyroid normal to palpation  RESP: lungs clear to auscultation - no rales, rhonchi or wheezes  BREAST: normal without masses, tenderness or nipple discharge and no palpable axillary masses or adenopathy  CV: regular rates and rhythm, no murmur, click or rub, no peripheral edema and peripheral pulses strong  ABDOMEN: soft, nontender, no " hepatosplenomegaly, no masses and bowel sounds normal   (female): normal female external genitalia, normal urethral meatus, vaginal mucosal atrophy noted, normal cervix, adnexae, and uterus without masses or abnormal discharge  MS: no musculoskeletal defects are noted and gait is age appropriate without ataxia  SKIN: no suspicious lesions or rashes  NEURO: Normal strength and tone, sensory exam grossly normal, mentation intact and speech normal  PSYCH: mentation appears normal and affect normal/bright    Diagnostic Test Results:  Labs reviewed in Epic  Results for orders placed or performed in visit on 12/19/22   HPV High Risk Types DNA Cervical     Status: None   Result Value Ref Range    Other HR HPV Negative Negative    HPV16 DNA Negative Negative    HPV18 DNA Negative Negative    FINAL DIAGNOSIS       This patient's sample is negative for HPV DNA.        This test was developed and its performance characteristics determined by the [facility]. It has not been cleared or approved by the FDA. The laboratory is regulated under CLIA as qualified to perform high-complexity testing. This test is used for clinical purposes. It should not be regarded as investigational or for research.    METHODOLOGY: The Roche Paul 4800 system uses automated extraction, simultaneous amplification of HPV (L1 region) and beta-globin, followed by real time detection of fluorescent labeled HPV and beta globin using specific oligonucleotide probes. The test specifically identifies types HPV 16 DNA and HPV 18 DNA while concurrently detecting the rest of the high risk types (31, 33, 35, 39, 45, 51, 52, 56, 58, 59, 66 or 68).    COMMENTS: This test is not intended for use as a screening device for woman under age 30 with normal cervical cytology. Results should be correlated with cytologic and histologic findings. Close clinical followup is recommended.       Pap screen with HPV -  recommended age 30 - 65 years     Status: None   Result Value Ref Range    Interpretation        Negative for Intraepithelial Lesion or Malignancy (NILM)    Comment         Papanicolaou Test Limitations:  Cervical cytology is a screening test with limited sensitivity, and regular screening is critical for cancer prevention.  Pap tests are primarily effective for the diagnosis/prevention of squamous cell carcinoma, not adenocarcinoma or other cancers.        Specimen Adequacy       Satisfactory for evaluation, endocervical/transformation zone component present    Clinical Information       post-menopausal      Reflex Testing Yes regardless of result     Previous Abnormal?       No      Performing Labs       The technical component of this testing was completed at [facility]         ASSESSMENT/PLAN:   Sheri was seen today for physical, imm/inj and imm/inj.    Diagnoses and all orders for this visit:    Routine general medical examination at a health care facility  -     REVIEW OF HEALTH MAINTENANCE PROTOCOL ORDERS    Preop general physical exam    Hypertension goal BP (blood pressure) < 130/80  The current medical regimen is effective.  Continue current medication regimen unchanged.    .Chronic kidney disease, stage 5 (H)  FOLLOW UP WITH SPECIALIST :Nephrology    Acquired hypothyroidism  -     TSH; Future  -     T4 free; Future  -      Screen for colon cancer  Colonoscopy is scheduled for February     Screening for hyperlipidemia  -     Lipid panel reflex to direct LDL Non-fasting; Future  -     Cancel: Lipid panel reflex to direct LDL Non-fasting    Cervical cancer screening  -     Pap screen with HPV - recommended age 30 - 65 years  -     HPV Hold (Lab Only)  -     HPV High Risk Types DNA Cervical    Need for prophylactic vaccination and inoculation against influenza  -     INFLUENZA QUAD, RECOMBINANT, P-FREE (RIV4) (FLUBLOK)    High priority for  2019-nCoV vaccine  -     COVID-19,PF,PFIZER (12+ Yrs Primary Series-GRAY LABEL)    Need for Streptococcus pneumoniae vaccination  -     Pneumococcal 20 Valent Conjugate (Prevnar 20)    Situational depression  -     traZODone (DESYREL) 50 MG tablet; Take 1 tablet (50 mg) by mouth At Bedtime    Psychophysiological insomnia  -     traZODone (DESYREL) 50 MG tablet; Take 1 tablet (50 mg) by mouth At Bedtime    Other orders  -     PFIZER COVID-19 VACCINE 2ND DOSE APPT; Future        Patient has been advised of split billing requirements and indicates understanding: Yes      COUNSELING:  Reviewed preventive health counseling, as reflected in patient instructions  Special attention given to:        Regular exercise       Healthy diet/nutrition       Vision screening       Immunizations    Vaccinated for: Covid-19, Influenza and Pneumococcal             Osteoporosis prevention/bone health       Colorectal Cancer Screening       Consider Hep C screening for all patients one time for ages 18-79 years       The 10-year ASCVD risk score (Em WOLFE, et al., 2019) is: 1.6%    Values used to calculate the score:      Age: 52 years      Sex: Female      Is Non- : No      Diabetic: No      Tobacco smoker: No      Systolic Blood Pressure: 126 mmHg      Is BP treated: Yes      HDL Cholesterol: 66 mg/dL      Total Cholesterol: 186 mg/dL       Advance Care Planning        She reports that she quit smoking about 2 months ago. Her smoking use included cigarettes. She has a 5.00 pack-year smoking history. She has never used smokeless tobacco.          RYDER Green CNP  [facility]

## 2022-12-19 NOTE — PATIENT INSTRUCTIONS
PLAN:   1.   Symptomatic therapy suggested:   Before your surgery:  10 days before: stop all over the counter supplements  1 week before: stop aspirin if you are taking aspirin.   stop ibuprofen, naproxen or similar antiinflammatory medications. You may use Tylenol for pain or headache.     On the day of your surgery:  Do not take any medication the morning of your surgery.     After surgery:    You may resume all your medications after the surgery unless your surgeon instructs you otherwise  2. Will try trazodone for sleep and anxiety     Orders Placed This Encounter   Procedures    REVIEW OF HEALTH MAINTENANCE PROTOCOL ORDERS    INFLUENZA QUAD, RECOMBINANT, P-FREE (RIV4) (FLUBLOK)    COVID-19,PF,PFIZER (12+ Yrs Primary Series-GRAY LABEL)    Pneumococcal 20 Valent Conjugate (Prevnar 20)    Lipid panel reflex to direct LDL Non-fasting    TSH    T4 free       3. Patient needs to follow up in if no improvement,or sooner if worsening of symptoms or other symptoms develop.  FUTURE LABS:       - Schedule a fasting blood draw   Will follow up and/or notify patient of  results via My Chart to determine further need for followup  Will need Covid vaccine, hepatitis series and shingles vaccine   Follow up office visit in one year for annual health maintenance exam, sooner PRN.    Preventive Health Recommendations  Female Ages 50 - 64    Yearly exam: See your health care provider every year in order to  Review health changes.   Discuss preventive care.    Review your medicines if your doctor has prescribed any.    Get a Pap test every three years (unless you have an abnormal result and your provider advises testing more often).  If you get Pap tests with HPV test, you only need to test every 5 years, unless you have an abnormal result.   You do not need a Pap test if your uterus was removed (hysterectomy) and you have not had cancer.  You should be tested each year for STDs (sexually transmitted diseases) if you're at risk.    Have a mammogram every 1 to 2 years.  Have a colonoscopy at age 50, or have a yearly FIT test (stool test). These exams screen for colon cancer.    Have a cholesterol test every 5 years, or more often if advised.  Have a diabetes test (fasting glucose) every three years. If you are at risk for diabetes, you should have this test more often.   If you are at risk for osteoporosis (brittle bone disease), think about having a bone density scan (DEXA).    Shots: Get a flu shot each year. Get a tetanus shot every 10 years.    Nutrition:   Eat at least 5 servings of fruits and vegetables each day.  Eat whole-grain bread, whole-wheat pasta and brown rice instead of white grains and rice.  Get adequate Calcium and Vitamin D.     Lifestyle  Exercise at least 150 minutes a week (30 minutes a day, 5 days a week). This will help you control your weight and prevent disease.  Limit alcohol to one drink per day.  No smoking.   Wear sunscreen to prevent skin cancer.   See your dentist every six months for an exam and cleaning.  See your eye doctor every 1 to 2 years.    For informational purposes only. Not to replace the advice of your health care provider. Copyright   2003, 2019 [facility]. All rights reserved. Clinically reviewed by Dawna Luis MD. Nulu [id number] - REV 12/22.  Preparing for Your Surgery  Getting started  A nurse will call you to review your health history and instructions. They will give you an arrival time based on your scheduled surgery time. Please be ready to share:  Your doctor's clinic name and phone number  Your medical, surgical, and anesthesia history  A list of allergies and sensitivities  A list of medicines, including herbal treatments and over-the-counter drugs  Whether the patient has a legal guardian (ask how to send us the papers in advance)  Please tell us if you're pregnant--or if there's any chance you might be pregnant. Some surgeries may injure a fetus (unborn baby), so they  require a pregnancy test. Surgeries that are safe for a fetus don't always need a test, and you can choose whether to have one.   If you have a child who's having surgery, please ask for a copy of Preparing for Your Child's Surgery.    Preparing for surgery  Within 10 to 30 days of surgery: Have a pre-op exam (sometimes called an H&P, or History and Physical). This can be done at a clinic or pre-operative center.  If you're having a , you may not need this exam. Talk to your care team.  At your pre-op exam, talk to your care team about all medicines you take. If you need to stop any medicines before surgery, ask when to start taking them again.  We do this for your safety. Many medicines can make you bleed too much during surgery. Some change how well surgery (anesthesia) drugs work.  Call your insurance company to let them know you're having surgery. (If you don't have insurance, call [phone].)  Call your clinic if there's any change in your health. This includes signs of a cold or flu (sore throat, runny nose, cough, rash, fever). It also includes a scrape or scratch near the surgery site.  If you have questions on the day of surgery, call your hospital or surgery center.  Eating and drinking guidelines  For your safety: Unless your surgeon tells you otherwise, follow the guidelines below.  Eat and drink as usual until 8 hours before you arrive for surgery. After that, no food or milk.  Drink clear liquids until 2 hours before you arrive. These are liquids you can see through, like water, Gatorade, and Propel Water. They also include plain black coffee and tea (no cream or milk), candy, and breath mints. You can spit out gum when you arrive.  If you drink alcohol: Stop drinking it the night before surgery.  If your care team tells you to take medicine on the morning of surgery, it's okay to take it with a sip of water.  Preventing infection  Shower or bathe the night before and morning of your  surgery. Follow the instructions your clinic gave you. (If no instructions, use regular soap.)  Don't shave or clip hair near your surgery site. We'll remove the hair if needed.  Don't smoke or vape the morning of surgery. You may chew nicotine gum up to 2 hours before surgery. A nicotine patch is okay.  Note: Some surgeries require you to completely quit smoking and nicotine. Check with your surgeon.  Your care team will make every effort to keep you safe from infection. We will:  Clean our hands often with soap and water (or an alcohol-based hand rub).  Clean the skin at your surgery site with a special soap that kills germs.  Give you a special gown to keep you warm. (Cold raises the risk of infection.)  Wear special hair covers, masks, gowns and gloves during surgery.  Give antibiotic medicine, if prescribed. Not all surgeries need antibiotics.  What to bring on the day of surgery  Photo ID and insurance card  Copy of your health care directive, if you have one  Glasses and hearing aids (bring cases)  You can't wear contacts during surgery  Inhaler and eye drops, if you use them (tell us about these when you arrive)  CPAP machine or breathing device, if you use them  A few personal items, if spending the night  If you have . . .  A pacemaker, ICD (cardiac defibrillator) or other implant: Bring the ID card.  An implanted stimulator: Bring the remote control.  A legal guardian: Bring a copy of the certified (court-stamped) guardianship papers.  Please remove any jewelry, including body piercings. Leave jewelry and other valuables at home.  If you're going home the day of surgery  You must have a responsible adult drive you home. They should stay with you overnight as well.  If you don't have someone to stay with you, and you aren't safe to go home alone, we may keep you overnight. Insurance often won't pay for this.  After surgery  If it's hard to control your pain or you need more pain medicine, please call your  surgeon's office.  Questions?   If you have any questions for your care team, list them here: _________________________________________________________________________________________________________________________________________________________________________ ____________________________________ ____________________________________ ____________________________________

## 2022-12-21 LAB
BKR LAB AP GYN ADEQUACY: NORMAL
BKR LAB AP GYN INTERPRETATION: NORMAL
BKR LAB AP HPV REFLEX: NORMAL
BKR LAB AP PREVIOUS ABNORMAL: NORMAL
PATH REPORT.COMMENTS IMP SPEC: NORMAL
PATH REPORT.COMMENTS IMP SPEC: NORMAL
PATH REPORT.RELEVANT HX SPEC: NORMAL

## 2022-12-23 LAB
HUMAN PAPILLOMA VIRUS 16 DNA: NEGATIVE
HUMAN PAPILLOMA VIRUS 18 DNA: NEGATIVE
HUMAN PAPILLOMA VIRUS FINAL DIAGNOSIS: NORMAL
HUMAN PAPILLOMA VIRUS OTHER HR: NEGATIVE

## 2022-12-27 ENCOUNTER — OFFICE VISIT (OUTPATIENT)
Dept: TRANSPLANT | Facility: CLINIC | Age: 52
End: 2022-12-27
Attending: NURSE PRACTITIONER
Payer: COMMERCIAL

## 2022-12-27 ENCOUNTER — LAB (OUTPATIENT)
Dept: LAB | Facility: CLINIC | Age: 52
End: 2022-12-27
Attending: NURSE PRACTITIONER
Payer: COMMERCIAL

## 2022-12-27 ENCOUNTER — ANCILLARY PROCEDURE (OUTPATIENT)
Dept: CARDIOLOGY | Facility: CLINIC | Age: 52
End: 2022-12-27
Attending: NURSE PRACTITIONER
Payer: COMMERCIAL

## 2022-12-27 ENCOUNTER — ANCILLARY PROCEDURE (OUTPATIENT)
Dept: GENERAL RADIOLOGY | Facility: CLINIC | Age: 52
End: 2022-12-27
Attending: NURSE PRACTITIONER
Payer: COMMERCIAL

## 2022-12-27 VITALS
OXYGEN SATURATION: 99 % | HEART RATE: 61 BPM | WEIGHT: 145 LBS | HEIGHT: 64 IN | BODY MASS INDEX: 24.75 KG/M2 | DIASTOLIC BLOOD PRESSURE: 85 MMHG | SYSTOLIC BLOOD PRESSURE: 145 MMHG

## 2022-12-27 DIAGNOSIS — Z76.82 ORGAN TRANSPLANT CANDIDATE: ICD-10-CM

## 2022-12-27 DIAGNOSIS — I10 ESSENTIAL HYPERTENSION: ICD-10-CM

## 2022-12-27 DIAGNOSIS — Z12.11 SCREEN FOR COLON CANCER: ICD-10-CM

## 2022-12-27 DIAGNOSIS — Z01.818 PRE-TRANSPLANT EVALUATION FOR KIDNEY TRANSPLANT: ICD-10-CM

## 2022-12-27 DIAGNOSIS — Q61.3 POLYCYSTIC KIDNEY DISEASE: ICD-10-CM

## 2022-12-27 DIAGNOSIS — N18.30 CHRONIC RENAL FAILURE, STAGE 3 (MODERATE), UNSPECIFIED WHETHER STAGE 3A OR 3B CKD (H): ICD-10-CM

## 2022-12-27 DIAGNOSIS — N18.5 CHRONIC KIDNEY DISEASE, STAGE 5 (H): ICD-10-CM

## 2022-12-27 DIAGNOSIS — Z87.891 HISTORY OF TOBACCO USE: ICD-10-CM

## 2022-12-27 DIAGNOSIS — E03.9 ACQUIRED HYPOTHYROIDISM: ICD-10-CM

## 2022-12-27 DIAGNOSIS — N18.6 ESRD (END STAGE RENAL DISEASE) (H): ICD-10-CM

## 2022-12-27 DIAGNOSIS — Z01.818 PRE-TRANSPLANT EVALUATION FOR KIDNEY TRANSPLANT: Primary | ICD-10-CM

## 2022-12-27 LAB
ALBUMIN MFR UR ELPH: 72.2 MG/DL
ALBUMIN SERPL BCG-MCNC: 4.2 G/DL (ref 3.5–5.2)
ANION GAP SERPL CALCULATED.3IONS-SCNC: 12 MMOL/L (ref 7–15)
ATRIAL RATE - MUSE: 57 BPM
BUN SERPL-MCNC: 42.3 MG/DL (ref 6–20)
CALCIUM SERPL-MCNC: 9 MG/DL (ref 8.6–10)
CHLORIDE SERPL-SCNC: 103 MMOL/L (ref 98–107)
CHOLEST SERPL-MCNC: 161 MG/DL
CREAT SERPL-MCNC: 3.86 MG/DL (ref 0.51–0.95)
CREAT UR-MCNC: 38.9 MG/DL
DEPRECATED HCO3 PLAS-SCNC: 24 MMOL/L (ref 22–29)
DIASTOLIC BLOOD PRESSURE - MUSE: NORMAL MMHG
GFR SERPL CREATININE-BSD FRML MDRD: 13 ML/MIN/1.73M2
GLUCOSE SERPL-MCNC: 112 MG/DL (ref 70–99)
HDLC SERPL-MCNC: 71 MG/DL
HGB BLD-MCNC: 10.1 G/DL (ref 11.7–15.7)
INTERPRETATION ECG - MUSE: NORMAL
LDLC SERPL CALC-MCNC: 74 MG/DL
LVEF ECHO: NORMAL
NONHDLC SERPL-MCNC: 90 MG/DL
P AXIS - MUSE: 72 DEGREES
PHOSPHATE SERPL-MCNC: 5.4 MG/DL (ref 2.5–4.5)
POTASSIUM SERPL-SCNC: 3.9 MMOL/L (ref 3.4–5.3)
PR INTERVAL - MUSE: 146 MS
PROT/CREAT 24H UR: 1.86 MG/MG CR (ref 0–0.2)
PTH-INTACT SERPL-MCNC: 156 PG/ML (ref 15–65)
QRS DURATION - MUSE: 98 MS
QT - MUSE: 462 MS
QTC - MUSE: 449 MS
R AXIS - MUSE: 67 DEGREES
SODIUM SERPL-SCNC: 139 MMOL/L (ref 136–145)
SYSTOLIC BLOOD PRESSURE - MUSE: NORMAL MMHG
T AXIS - MUSE: 67 DEGREES
T4 FREE SERPL-MCNC: 0.9 NG/DL (ref 0.9–1.7)
TRIGL SERPL-MCNC: 79 MG/DL
TSH SERPL DL<=0.005 MIU/L-ACNC: 2.75 UIU/ML (ref 0.3–4.2)
VENTRICULAR RATE- MUSE: 57 BPM

## 2022-12-27 PROCEDURE — 94729 DIFFUSING CAPACITY: CPT | Performed by: INTERNAL MEDICINE

## 2022-12-27 PROCEDURE — 80061 LIPID PANEL: CPT | Performed by: PATHOLOGY

## 2022-12-27 PROCEDURE — 94726 PLETHYSMOGRAPHY LUNG VOLUMES: CPT | Performed by: INTERNAL MEDICINE

## 2022-12-27 PROCEDURE — 94060 EVALUATION OF WHEEZING: CPT | Performed by: INTERNAL MEDICINE

## 2022-12-27 PROCEDURE — 83970 ASSAY OF PARATHORMONE: CPT | Performed by: PATHOLOGY

## 2022-12-27 PROCEDURE — 71046 X-RAY EXAM CHEST 2 VIEWS: CPT | Mod: GC | Performed by: RADIOLOGY

## 2022-12-27 PROCEDURE — 84439 ASSAY OF FREE THYROXINE: CPT | Performed by: PATHOLOGY

## 2022-12-27 PROCEDURE — 36415 COLL VENOUS BLD VENIPUNCTURE: CPT | Performed by: PATHOLOGY

## 2022-12-27 PROCEDURE — 93306 TTE W/DOPPLER COMPLETE: CPT | Performed by: INTERNAL MEDICINE

## 2022-12-27 PROCEDURE — G0463 HOSPITAL OUTPT CLINIC VISIT: HCPCS | Performed by: SURGERY

## 2022-12-27 PROCEDURE — 85018 HEMOGLOBIN: CPT | Performed by: PATHOLOGY

## 2022-12-27 PROCEDURE — 84443 ASSAY THYROID STIM HORMONE: CPT | Performed by: PATHOLOGY

## 2022-12-27 PROCEDURE — 99215 OFFICE O/P EST HI 40 MIN: CPT | Performed by: SURGERY

## 2022-12-27 PROCEDURE — 84156 ASSAY OF PROTEIN URINE: CPT | Performed by: PATHOLOGY

## 2022-12-27 PROCEDURE — 86481 TB AG RESPONSE T-CELL SUSP: CPT

## 2022-12-27 PROCEDURE — 80069 RENAL FUNCTION PANEL: CPT | Performed by: PATHOLOGY

## 2022-12-27 NOTE — NURSING NOTE
"Chief Complaint   Patient presents with     Transplant Evaluation     Kidney      Blood pressure (!) 145/85, pulse 61, height 1.626 m (5' 4\"), weight 65.8 kg (145 lb), SpO2 99 %, unknown if currently breastfeeding.    Eli Azevedo, [facility]    "

## 2022-12-27 NOTE — PROGRESS NOTES
Transplant Surgery Consult Note     Medical record number: [id number]  YOB: 1970,   Consult requested  for evaluation of kidney transplant candidacy.    Assessment and Recommendations:Ms. Joyce appears to be a excellent candidate for kidney transplantation and has a good understanding of the risks and benefits of this approach to the management of renal failure. The following issues should be addressed prior to finalizing her transplant candidacy:     Transplant order: Ms. Joyce has Chronic renal failure due to polycystic kidney disease (PKD) whose condition is not expected to resolve, is expected to progress, and is expected to continue to develop related comorbid conditions.  Recommend he be considered as a candidate for kidney transplant.  Cardiology consult for cardiac risk stratification to be ordered: No  CT abdomen and pelvis without contrast to be ordered for assessment of vascular targets: No  Transplant listing labs ordered to include HLA, ABOx2, Cr, etc.  Dietician consult ordered: Yes  Social work consult ordered: Yes  Imaging reports reviewed:  CT from 9/2022  IMPRESSION:   1. Sequela of polycystic kidney disease with innumerable cysts  replacing the hepatic and renal parenchyma. Volumes as detailed.  2. No acute findings explain patient's abdominal pain.   3. Mild emphysematous changes in the lungs.  4. Possible mild enteritis of the small bowel.  Radiology images reviewed: Kidney are large. Right kidney extends into pelvis. Left kidney smaller and looks to be room on the left side for kidney transplant  Recipient suitable to move forward with work up of living donors:  Yes  Gave info about BABG and donor health questionnaire.  MRI scheduled  Colonoscopy scheduled  Should be listed inactive until work up completed    The majority of our visit was spent in counselling, discussing the medical and surgical risks of kidney transplantation. We discussed approximate wait time and how that is  influenced by issues such as blood type and sensitization (PRA) and access to a living donor. I contrasted potential waiting time for living vs  donor kidneys from  normal (0-85%) or higher (%) kidney donor profile index (KDPI) donors and their associated outcomes. I would recommend this individual to consider kidneys from high KDPI donors. The reason for this decision is best summarized as: decreased dialysis related morbidity/mortality, accepting lower kidney graft survival rates. Potential surgical complications of kidney transplantation include bleeding, superficial or deep wound complications (infection, hernia, lymphocele), ureteral anastomotic failure (leak or stenosis), graft thrombosis, need for reoperation and other issues such as cardiac complications, pneumonia, deep venous thrombosis, pulmonary embolism, post transplant diabetes and death. The potential for recurrent disease or need for retransplantation was also addressed. We discussed the possible need for ureteral stent (and subsequent removal), and the utility of protocol biopsy and laboratory studies to evaluate for rejection or recurrent disease. We discussed the risk of graft rejection, our center's average graft and patient survival rates, immunosuppression protocols, as well as the potential opportunity to participate in clinical trials.  We also discussed the average length of stay, recovery process, and posttransplant lab and monitoring protocol.  I emphasized the need for strict immunosuppression medication adherence and the potential for complications of immunosuppression such as skin cancer or lymphoma, as well as a very low but not zero risk of donor-derived disease transmission risks (infection, cancer). Ms. Joyce asked good questions and her candidacy will be reviewed at our Multidisciplinary Selection Committee. Thank you for the opportunity to participate in Ms. Joyce's care.      Total time: 45 minutes        Juanita ROJAS  MD ARSH Hagen  Assistant Professor of Surgery  Director, Living Kidney Donor Program.    ---------------------------------------------------------------------------------------------------    HPI: Ms. Joyce has Chronic renal failure due to polycystic kidney disease (PKD). The patient is non-diabetic.     Kidney size symptoms  Sore back but manageable  Some nerve pain  No early satiety  Does have nausea  No constipation  No SOB with laying flat     The patient is not on dialysis.    Has potential kidney donors:  Yes .  Interested in participation in paired exchange if a donor is willing: Yes     The patient has the following pertinent history:       No    Yes  Dialysis:    [x]      [] via:       Blood Transfusion                  [x]      []  Number of units:   Most recently:  Pregnancy:    []      [x] Number:   2    Previous Transplant:  [x]      [] Details:    Cancer    [x]      [] Comment:   Kidney stones   [x]      [] Comment:      Recurrent infections  [x]      []  Type:                  Bladder dysfunction  [x]      [] Cause:    Claudication   [x]      [] Distance:    Previous Amputation  [x]      [] Cause:     Chronic anticoagulation  [x]      [] Indication:   Jewish  [x]      []      Past Medical History:   Diagnosis Date     Anxiety      Chronic kidney disease      Contraception 03/19/2009     Depression      Former tobacco use      Hypertension      Liver disease      PKD (polycystic kidney disease)      Polycystic kidney, unspecified type      Thyroid disease      Varicosities      Past Surgical History:   Procedure Laterality Date     LAPAROSCOPIC DECORTICATION CYST RENAL  01/01/2006     MYRINGOTOMY, INSERT TUBE(S), ADENOIDECTOMY, COMBINED       PE TUBES  age 13     SURGICAL HISTORY OF -   11/01/2005    kidney surgery for cyst removal     Family History   Problem Relation Age of Onset     Lung Cancer Mother         lung     Hypertension Father      Breast Cancer Maternal Aunt      Breast  Cancer Cousin      Breast Cancer Cousin      Cerebrovascular Disease Paternal Aunt 70     Diabetes No family hx of      C.A.D. No family hx of      Cancer - colorectal No family hx of      Prostate Cancer No family hx of      Social History     Socioeconomic History     Marital status:      Spouse name: Not on file     Number of children: Not on file     Years of education: Not on file     Highest education level: Not on file   Occupational History     Not on file   Tobacco Use     Smoking status: Former     Packs/day: 0.50     Years: 10.00     Pack years: 5.00     Types: Cigarettes     Quit date: 10/17/2022     Years since quittin.1     Smokeless tobacco: Never   Vaping Use     Vaping Use: Never used   Substance and Sexual Activity     Alcohol use: Yes     Comment: Very occasionally     Drug use: Yes     Types: Marijuana     Comment: Smokes once daily to induce appetite.     Sexual activity: Yes     Partners: Male     Birth control/protection: None   Other Topics Concern     Parent/sibling w/ CABG, MI or angioplasty before 65F 55M? No   Social History Narrative    ** Merged History Encounter **         Dairy/d 2 servings/d.     Caffeine 3 servings/d    Exercise 5-7 x week walking    Sunscreen used - Yes    Seatbelts used - Yes    Working smoke/CO detectors in the home - NO    Guns stored in the home - No    Self Breast Exams - Yes    Self Testicular Exam - NA    Eye Exam up to date - Yes    Dental Exam up to date - No    Pap Smear up to date - Yes    Mammogram up to date - No    PSA up to date - NA    Dexa Scan up to date - NA    Flex Sig / Colonoscopy up to date - NA    Immunizations up to date - No    Abuse: Current or Past(Physical, Sexual or Emotional)- No    Do you feel safe in your environment - Yes    HONG KOCH LPN.    06         Social Determinants of Health     Financial Resource Strain: Not on file   Food Insecurity: Not on file   Transportation Needs: Not on file   Physical Activity:  Not on file   Stress: Not on file   Social Connections: Not on file   Intimate Partner Violence: Not on file   Housing Stability: Not on file       ROS:   CONSTITUTIONAL:  No fevers or chills  EYES: negative for icterus  ENT:  negative for hearing loss, tinnitus and sore throat  RESPIRATORY:  negative for cough, sputum, dyspnea  CARDIOVASCULAR:  negative for chest pain Fatigue  GASTROINTESTINAL:  negative for nausea, vomiting, diarrhea or constipation  GENITOURINARY:  negative for incontinence, dysuria, bladder emptying problems  HEME:  No easy bruising  INTEGUMENT:  negative for rash and pruritus  NEURO:  Negative for headache, seizure disorder  Allergies:   Allergies   Allergen Reactions     Bactrim [Sulfamethoxazole W/Trimethoprim] Itching     Seasonal Allergies      Pcn [Penicillin G]      Medications:  Prescription Medications as of 12/27/2022       Rx Number Disp Refills Start End Last Dispensed Date Next Fill Date Owning Pharmacy    amLODIPine (NORVASC) 10 MG tablet  90 tablet 3 10/12/2022    [facility] #[id number] [address] [place] [address]    Sig: Take 1 tablet (10 mg) by mouth daily    Class: E-Prescribe    Route: Oral    cyclobenzaprine (FLEXERIL) 10 MG tablet  30 tablet 3 8/15/2019    [id number] [place] - [address], [facility] ([place], [place])    Sig: Take 1 tablet (10 mg) by mouth every evening as needed    Class: E-Prescribe    Route: Oral    gabapentin (NEURONTIN) 100 MG capsule  30 capsule 0 12/2/2022    [id number], [place] - [address] [place] [address]    Sig: Take 1 capsule (100 mg) by mouth At Bedtime    Class: E-Prescribe    Route: Oral    lisinopril (ZESTRIL) 40 MG tablet  90 tablet 1 4/23/2020    [facility][id number] [place] - [address], [place] [facility]    Sig: Take 1 tablet (40 mg) by mouth daily    Class: E-Prescribe    Route: Oral  "   sennosides (SENOKOT) 8.6 MG tablet            Sig: Take 1 tablet by mouth daily as needed for constipation    Class: Historical    Route: Oral    traZODone (DESYREL) 50 MG tablet  30 tablet 1 12/19/2022    [facility] #[id number] - [facility], [place] - [address] [facility]    Sig: Take 1 tablet (50 mg) by mouth At Bedtime    Class: E-Prescribe    Route: Oral    varenicline (CHANTIX) 1 MG tablet  60 tablet 2 10/12/2022    [facility] #[id number] - [facility], [place] - [address] [facility]    Sig: Take 1 tablet (1 mg) by mouth 2 times daily    Class: E-Prescribe    Route: Oral        Exam:     BP (!) 145/85   Pulse 61   Ht 1.626 m (5' 4\")   Wt 65.8 kg (145 lb)   LMP  (LMP Unknown)   SpO2 99%   BMI 24.89 kg/m    Appearance: in no apparent distress.   Skin: normal  Eyes:  no redness or discharge.  Sclera anicteric  Head and Neck: Normal, no rashes or jaundice  Respiratory: easy respirations, no audible wheezing.  Abdomen: rounded and distended, No surgical scars and right kidney extending down into the pelvis. Left kidney inferior pole extending just past ASIS.   Extremities: femoral 3+/3+, Edema, none  Neuro: without deficit   Psychiatric: Normal mood and affect    Diagnostics:   Recent Results (from the past 672 hour(s))   Hemoglobin    Collection Time: 11/30/22  8:17 AM   Result Value Ref Range    Hemoglobin 11.3 (L) 11.7 - 15.7 g/dL   Parathyroid Hormone Intact    Collection Time: 11/30/22  8:17 AM   Result Value Ref Range    Parathyroid Hormone Intact 61 15 - 65 pg/mL   Protein  random urine    Collection Time: 11/30/22  8:17 AM   Result Value Ref Range    Total Protein Urine mg/dL 54.8 mg/dL    Total Protein UR MG/MG CR 1.44 (H) 0.00 - 0.20 mg/mg Cr    Creatinine Urine mg/dL 38.0 mg/dL   Renal panel    Collection Time: 11/30/22  8:17 AM   Result Value Ref Range    Sodium 137 133 - 144 mmol/L    Potassium 3.7 3.4 - 5.3 mmol/L    Chloride 107 94 - " 109 mmol/L    Carbon Dioxide (CO2) 24 20 - 32 mmol/L    Anion Gap 6 3 - 14 mmol/L    Urea Nitrogen 40 (H) 7 - 30 mg/dL    Creatinine 3.75 (H) 0.52 - 1.04 mg/dL    Calcium 9.0 8.5 - 10.1 mg/dL    Glucose 105 (H) 70 - 99 mg/dL    Albumin 3.6 3.4 - 5.0 g/dL    Phosphorus 4.4 2.5 - 4.5 mg/dL    GFR Estimate 14 (L) >60 mL/min/1.73m2   Hepatitis B core antibody    Collection Time: 12/08/22  9:10 AM   Result Value Ref Range    Hepatitis B Core Antibody Total Nonreactive Nonreactive   Hepatitis B Surface Antibody    Collection Time: 12/08/22  9:10 AM   Result Value Ref Range    Hepatitis B Surface Antibody Instrument Value 1.26 <8.00 m[IU]/mL    Hepatitis B Surface Antibody Nonreactive    Hepatitis B surface antigen    Collection Time: 12/08/22  9:10 AM   Result Value Ref Range    Hepatitis B Surface Antigen Nonreactive Nonreactive   UA with Microscopic reflex to Culture    Collection Time: 12/08/22  9:10 AM    Specimen: Urine, Midstream   Result Value Ref Range    Color Urine Yellow Colorless, Straw, Light Yellow, Yellow    Appearance Urine Clear Clear    Glucose Urine Negative Negative mg/dL    Bilirubin Urine Negative Negative    Ketones Urine Negative Negative mg/dL    Specific Gravity Urine <=1.005 1.003 - 1.035    Blood Urine Trace (A) Negative    pH Urine 5.5 5.0 - 7.0    Protein Albumin Urine 30 (A) Negative mg/dL    Urobilinogen Urine 0.2 0.2, 1.0 E.U./dL    Nitrite Urine Negative Negative    Leukocyte Esterase Urine Trace (A) Negative   Varicella Zoster Virus Antibody IgG [[id number]]    Collection Time: 12/08/22  9:10 AM   Result Value Ref Range    VZV Vera IgG Instrument Value 1,235.0 <135.0 Index    Varicella Zoster Antibody IgG Positive    Treponema Abs w Reflex to RPR and Titer [[id number]]    Collection Time: 12/08/22  9:10 AM   Result Value Ref Range    Treponema Antibody Total Nonreactive Nonreactive   HIV Antigen Antibody Combo Pretransplant    Collection Time: 12/08/22  9:10 AM   Result Value Ref Range    HIV  Antigen Antibody Combo Pretransplant Nonreactive Nonreactive   Hepatitis C antibody [[id number]]    Collection Time: 12/08/22  9:10 AM   Result Value Ref Range    Hepatitis C Antibody Nonreactive Nonreactive   EBV Capsid Antibody IgG [[id number]]    Collection Time: 12/08/22  9:10 AM   Result Value Ref Range    EBV Capsid Vera IgG Instrument Value >750.0 (H) <18.0 U/mL    EBV Capsid Antibody IgG Positive (A) No detectable antibody.   CMV Antibody IgG [[id number]]    Collection Time: 12/08/22  9:10 AM   Result Value Ref Range    CMV Vera IgG Instrument Value 9.20 (H) <0.60 U/mL    CMV Antibody IgG Positive, suggests recent or past exposure. (A) No detectable antibody.    Thrombin time [[id number]]    Collection Time: 12/08/22  9:10 AM   Result Value Ref Range    Thrombin Time 16.6 13.0 - 19.0 Seconds   Partial thromboplastin time [LAB56]    Collection Time: 12/08/22  9:10 AM   Result Value Ref Range    aPTT 35 22 - 38 Seconds   Lupus Anticoagulant Panel [[id number]]    Collection Time: 12/08/22  9:10 AM   Result Value Ref Range    PTT Ratio 1.46 (H) <1.21    Platelet Neutralization -7 <=-3 Seconds    PTT 1:2 MIX 42 31 - 45 Seconds    DRVVT Screen Ratio 0.93 <1.08    Lupus Result Negative Negative    Lupus Interpretation       INR is normal.  APTT ratio is elevated.  Platelet Neutralization is negative.  APTT 1:2 Mix is normal.  DRVVT Screen ratio is normal.  Thrombin time is normal.  NEGATIVE TEST; A LUPUS ANTICOAGULANT WAS NOT DETECTED IN THIS SPECIMEN WITHIN THE LIMITS OF THE TESTING REPERTOIRE.  If the clinical picture is strongly suggestive of an antiphospholipid syndrome, recommend anticardiolipin and beta-2-glycoprotein (IgG and IgM) antibody tests.  Platelet Neutralization and APTT 1:2 Mix are suggestive of factor deficiency.   Recommend factors 8, 9, 11 and 12 (factors VIII, IX, XI, and XII) levels if clinically indicated.    Loree De La Cruz MD, PhD  [facility]           INR [[id number]]    Collection Time: 12/08/22  9:10  AM   Result Value Ref Range    INR 0.97 0.85 - 1.15   Factor 2 and 5 mutation analysis    Collection Time: 12/08/22  9:10 AM   Result Value Ref Range    METHODOLOGY       The regions of genomic DNA containing the F5 gene mutation R506Q(1691G>A) and the Factor 2 (Prothrombin I18304D) gene mutation were simultaneously amplified using the polymerase chain reaction. The amplified products were digested with restriction endonuclease TaqI and products were analyzed by gel electrophoresis.       RESULTS         Factor V 1691G>A (Leiden)  RESULTS:  Mutation analyzed: 1691G>A  Factor V 1691G>A (Leiden)  Interpretation:  ABSENT  Factor V 1691G>A (Leiden) mutation  genotype:  G/G    FACTOR 2/PROTHROMBIN RESULTS:  Mutation analyzed: 83060M>A  Factor 2 Mutation Interpretation:  ABSENT  Factor 2 Mutation genotype:  G/G        INTERPRETATION       The patient is negative for the Factor V 1691G>A ([place]iden) and negative for the Factor 2 mutation.  (Electronically signed by: Pavan Garcia MD December 12, 2022 5:27 PM)      COMMENTS       If a patient is the recipient of an allogeneic bone marrow transplant, this test must be done on a pre-transplant sample or buccal swab.  A previous allogeneic bone marrow transplant will interfere with test results.  Call the [facility] ([phone]) for instructions on sample collection for these patients.      DISCLAIMER       This test was developed and its performance characteristics determined by [facility]. It has not been cleared or approved by the FDA. The laboratory is regulated under CLIA as qualified to perform high-complexity testing. This test is used for clinical purposes. It should not be regarded as investigational or for research.    A resident/fellow in an accredited training program was involved in the selection of testing, review of laboratory data, and/or interpretation of this case.  I, as the senior physician, attest that  I: (i) confirmed appropriate testing, (ii) examined the relevant raw data for the specimen(s); and (iii) rendered or confirmed the interpretation(s).      FACTOR 2 INTERPRETATION         Factor 2 Mutation Interpretation:  ABSENT      FACTOR V INTERPRETATION       Factor V 1691G>A ([place]iden)  Interpretation:  ABSENT      Specimen Description       Blood: ACD     Cardiolipin Vera IgG and IgM [LAB 6836]    Collection Time: 12/08/22  9:10 AM   Result Value Ref Range    Cardiolipin Vera IgG Instrument Value <2.0 <10.0 GPL-U/mL    Cardiolipin Antibody IgG Negative Negative    Cardiolipin Vera IgM Instrument Value <2.0 <10.0 MPL-U/mL    Cardiolipin Antibody IgM Negative Negative   Comprehensive metabolic panel [LAB17]    Collection Time: 12/08/22  9:10 AM   Result Value Ref Range    Sodium 137 133 - 144 mmol/L    Potassium 3.4 3.4 - 5.3 mmol/L    Chloride 104 94 - 109 mmol/L    Carbon Dioxide (CO2) 24 20 - 32 mmol/L    Anion Gap 9 3 - 14 mmol/L    Urea Nitrogen 52 (H) 7 - 30 mg/dL    Creatinine 3.58 (H) 0.52 - 1.04 mg/dL    Calcium 9.2 8.5 - 10.1 mg/dL    Glucose 99 70 - 99 mg/dL    Alkaline Phosphatase 62 40 - 150 U/L    AST 10 0 - 45 U/L    ALT 15 0 - 50 U/L    Protein Total 8.3 6.8 - 8.8 g/dL    Albumin 3.6 3.4 - 5.0 g/dL    Bilirubin Total 0.3 0.2 - 1.3 mg/dL    GFR Estimate 15 (L) >60 mL/min/1.73m2   Blood Group A Subtype    Collection Time: 12/08/22  9:10 AM   Result Value Ref Range    A1 Antigen Type Negative     SPECIMEN EXPIRATION DATE 20221211235900    Antibody titer red cell [[id number]]    Collection Time: 12/08/22  9:10 AM   Result Value Ref Range    Anti-B IgG Titer 4     Anti-B IgM Titer 8     SPECIMEN EXPIRATION DATE 20221211235900     ANTIBODY TITER IGM SCREEN Negative    ABO and Rh    Collection Time: 12/08/22  9:10 AM   Result Value Ref Range    ABO/RH(D) A POS     SPECIMEN EXPIRATION DATE [id number]    Parathyroid Hormone Intact    Collection Time: 12/08/22  9:10 AM   Result Value Ref Range    Parathyroid  Hormone Intact 44 15 - 65 pg/mL   Protein  random urine    Collection Time: 12/08/22  9:10 AM   Result Value Ref Range    Total Protein Urine mg/dL 44.1 mg/dL    Total Protein UR MG/MG CR 1.34 (H) 0.00 - 0.20 mg/mg Cr    Creatinine Urine mg/dL 32.9 mg/dL   Quantiferon TB Gold Plus Grey Tube    Collection Time: 12/08/22  9:10 AM    Specimen: Peripheral Blood   Result Value Ref Range    Quantiferon Nil Tube 0.05 IU/mL   Quantiferon TB Gold Plus Green Tube    Collection Time: 12/08/22  9:10 AM    Specimen: Peripheral Blood   Result Value Ref Range    Quantiferon TB1 Tube 0.05 IU/mL   Quantiferon TB Gold Plus Yellow Tube    Collection Time: 12/08/22  9:10 AM    Specimen: Peripheral Blood   Result Value Ref Range    Quantiferon TB2 Tube 0.06    Quantiferon TB Gold Plus Purple Tube    Collection Time: 12/08/22  9:10 AM    Specimen: Peripheral Blood   Result Value Ref Range    Quantiferon Mitogen 10.00 IU/mL   CBC with platelets and differential    Collection Time: 12/08/22  9:10 AM   Result Value Ref Range    WBC Count 6.4 4.0 - 11.0 10e3/uL    RBC Count 3.55 (L) 3.80 - 5.20 10e6/uL    Hemoglobin 10.9 (L) 11.7 - 15.7 g/dL    Hematocrit 32.5 (L) 35.0 - 47.0 %    MCV 92 78 - 100 fL    MCH 30.7 26.5 - 33.0 pg    MCHC 33.5 31.5 - 36.5 g/dL    RDW 12.5 10.0 - 15.0 %    Platelet Count 218 150 - 450 10e3/uL    % Neutrophils 61 %    % Lymphocytes 29 %    % Monocytes 6 %    % Eosinophils 3 %    % Basophils 1 %    % Immature Granulocytes 0 %    Absolute Neutrophils 3.9 1.6 - 8.3 10e3/uL    Absolute Lymphocytes 1.9 0.8 - 5.3 10e3/uL    Absolute Monocytes 0.4 0.0 - 1.3 10e3/uL    Absolute Eosinophils 0.2 0.0 - 0.7 10e3/uL    Absolute Basophils 0.0 0.0 - 0.2 10e3/uL    Absolute Immature Granulocytes 0.0 <=0.4 10e3/uL   Adult Type and Screen    Collection Time: 12/08/22  9:10 AM   Result Value Ref Range    ABO/RH(D) A POS     Antibody Screen Negative Negative    SPECIMEN EXPIRATION DATE [id number]    Phosphorus    Collection Time:  12/08/22  9:10 AM   Result Value Ref Range    Phosphorus 4.5 2.5 - 4.5 mg/dL   Urine Microscopic    Collection Time: 12/08/22  9:10 AM   Result Value Ref Range    Bacteria Urine Few (A) None Seen /HPF    RBC Urine None Seen 0-2 /HPF /HPF    WBC Urine 5-10 (A) 0-5 /HPF /HPF    Squamous Epithelials Urine Few (A) None Seen /LPF    WBC Clumps Urine Present (A) None Seen /HPF   Quantiferon TB Gold Plus    Collection Time: 12/08/22  9:10 AM    Specimen: Peripheral Blood   Result Value Ref Range    Quantiferon-TB Gold Plus Negative Negative    TB1 Ag minus Nil Value 0.00 IU/mL    TB2 Ag minus Nil Value 0.01 IU/mL    Mitogen minus Nil Result 9.95 IU/mL    Nil Result 0.05 IU/mL   HLA-ABC Typing High Resolution    Collection Time: 12/08/22  9:10 AM   Result Value Ref Range    ABTEST METHOD NGS     hiresA-1 A*01:01     hiresA-1Equiv 1     hiresA-2 A*29:02     hiresA-2Equiv 29     hiresB-1 B*08:01     hiresB-1Equiv 8     hiresB-2 B*14:02     hiresB-2Equiv 65     hiresC-1 C*07:01     hiresC-1Equiv 7     hiresC-2 C*08:02     hiresC-2Equiv 8     hiresBw-1 Bw*6    HLA-DR Typing High Resolution    Collection Time: 12/08/22  9:10 AM   Result Value Ref Range    DRhiresTestM NGS     hiresDPA1-1 DPA1*01:03     hiresDPA1-2 DPA1*02:01     hiresDPB1-1 DPB1*01:01     hiresDPB1-2 DPB1*04:01     hiresDQA1-1 DQA1*05:01     hiresDQB1-1 DQB1*02:01     hiresDQB1-1Equiv 2     hiresDRB1-1 DRB1*03:01     hiresDRB1-1Equiv 17     hiresDRB3-1 DRB3*01:01     hiresDRB3-1Equiv 52     DRB3 locus NMDP FJKD 01/91     hiresDRB3-2 DRB3*02:02     hiresDRB3-2Equiv 52     DRB3 NMDP JFUS 02/133    HLA Maribeth Class I, Single Antigen    Collection Time: 12/08/22  9:10 AM   Result Value Ref Range    SA 1 TEST METHOD SA EDTA FCS     SA 1 CELL Class I     SA1 HI RISK MARIBETH       A:23 24 25 32 B:27 37 38 44 49 51 52 53 57 58 59 63 77 Cw:2 4 5 6 15 17 18    SA1 MOD RISK MARIBETH B:13 47     SA 1  COMMENTS        HLA PRA Test performed by modified testing procedure that may also  include pretreatment of serum. Pretreatment may be the addition of fetal calf serum, EDTA, and/or adsorption.  High-risk, MFI > 3,000.  Mod-risk, -3,000.   HLA Maribeth Class II, Single Antigen    Collection Time: 12/08/22  9:10 AM   Result Value Ref Range    SA 2 TEST METHOD SA EDTA FCS     SA 2 CELL Class II     SA2 HI RISK MARIBETH DQ:5 6 7 8 9     SA2 MOD RISK MARIBETH None     SA 2 COMMENTS        HLA PRA Test performed by modified testing procedure that may also include pretreatment of serum. Pretreatment may be the addition of fetal calf serum, EDTA, and/or adsorption.  High-risk, MFI > 3,000.  Mod-risk, -3,000.   HLA Maribeth, CPRA    Collection Time: 12/08/22  9:10 AM   Result Value Ref Range    PROTOCOL CUTOFF Plan A, 500 mfi cumulative     UNOS CPRA 97     UNACCEPTABLE ANTIGENS       A:23 24 25 32 B:13 27 37 38 44 47 49 51 52 53 57 58 59 63 77 DQ:5 6 7 8 9   Pap screen with HPV - recommended age 30 - 65 years    Collection Time: 12/19/22  7:30 AM   Result Value Ref Range    Interpretation        Negative for Intraepithelial Lesion or Malignancy (NILM)    Comment         Papanicolaou Test Limitations:  Cervical cytology is a screening test with limited sensitivity, and regular screening is critical for cancer prevention.  Pap tests are primarily effective for the diagnosis/prevention of squamous cell carcinoma, not adenocarcinoma or other cancers.        Specimen Adequacy       Satisfactory for evaluation, endocervical/transformation zone component present    Clinical Information       post-menopausal      Reflex Testing Yes regardless of result     Previous Abnormal?       No      Performing Labs       The technical component of this testing was completed at [facility]     HPV High Risk Types DNA Cervical    Collection Time: 12/19/22  7:30 AM   Result Value Ref Range    Other HR HPV Negative Negative    HPV16 DNA Negative Negative    HPV18 DNA  Negative Negative    FINAL DIAGNOSIS       This patient's sample is negative for HPV DNA.        This test was developed and its performance characteristics determined by the [facility]. It has not been cleared or approved by the FDA. The laboratory is regulated under CLIA as qualified to perform high-complexity testing. This test is used for clinical purposes. It should not be regarded as investigational or for research.    METHODOLOGY: The Roche Paul [id number] system uses automated extraction, simultaneous amplification of HPV (L1 region) and beta-globin, followed by real time detection of fluorescent labeled HPV and beta globin using specific oligonucleotide probes. The test specifically identifies types HPV 16 DNA and HPV 18 DNA while concurrently detecting the rest of the high risk types (31, 33, 35, 39, 45, 51, 52, 56, 58, 59, 66 or 68).    COMMENTS: This test is not intended for use as a screening device for woman under age 30 with normal cervical cytology. Results should be correlated with cytologic and histologic findings. Close clinical followup is recommended.       Echocardiogram Complete    Collection Time: 12/27/22  6:58 AM   Result Value Ref Range    LVEF  55-60%    EKG 12-lead, tracing only [EKG1]    Collection Time: 12/27/22  7:17 AM   Result Value Ref Range    Systolic Blood Pressure  mmHg    Diastolic Blood Pressure  mmHg    Ventricular Rate 57 BPM    Atrial Rate 57 BPM    IN Interval 146 ms    QRS Duration 98 ms     ms    QTc 449 ms    P Axis 72 degrees    R AXIS 67 degrees    T Axis 67 degrees    Interpretation ECG       Sinus bradycardia  Otherwise normal ECG  No previous ECGs available       UNOS CPRA   Date Value Ref Range Status   12/08/2022 97  Final

## 2022-12-27 NOTE — LETTER
12/27/2022         RE: Sheri Joyce  [address]  [facility] [address]        Dear Colleague,    Thank you for referring your patient, Sheri Joyce, to the [facility]. Please see a copy of my visit note below.    Transplant Surgery Consult Note     Medical record number: [id number]  YOB: 1970,   Consult requested  for evaluation of kidney transplant candidacy.    Assessment and Recommendations:Ms. Joyce appears to be a excellent candidate for kidney transplantation and has a good understanding of the risks and benefits of this approach to the management of renal failure. The following issues should be addressed prior to finalizing her transplant candidacy:     Transplant order: Ms. Joyce has Chronic renal failure due to polycystic kidney disease (PKD) whose condition is not expected to resolve, is expected to progress, and is expected to continue to develop related comorbid conditions.  Recommend he be considered as a candidate for kidney transplant.  Cardiology consult for cardiac risk stratification to be ordered: No  CT abdomen and pelvis without contrast to be ordered for assessment of vascular targets: No  Transplant listing labs ordered to include HLA, ABOx2, Cr, etc.  Dietician consult ordered: Yes  Social work consult ordered: Yes  Imaging reports reviewed:  CT from 9/2022  IMPRESSION:   1. Sequela of polycystic kidney disease with innumerable cysts  replacing the hepatic and renal parenchyma. Volumes as detailed.  2. No acute findings explain patient's abdominal pain.   3. Mild emphysematous changes in the lungs.  4. Possible mild enteritis of the small bowel.  Radiology images reviewed: Kidney are large. Right kidney extends into pelvis. Left kidney smaller and looks to be room on the left side for kidney transplant  Recipient suitable to move forward with work up of living donors:  Yes  Gave info about BABG and donor health questionnaire.  MRI  scheduled  Colonoscopy scheduled  Should be listed inactive until work up completed    The majority of our visit was spent in counselling, discussing the medical and surgical risks of kidney transplantation. We discussed approximate wait time and how that is influenced by issues such as blood type and sensitization (PRA) and access to a living donor. I contrasted potential waiting time for living vs  donor kidneys from  normal (0-85%) or higher (%) kidney donor profile index (KDPI) donors and their associated outcomes. I would recommend this individual to consider kidneys from high KDPI donors. The reason for this decision is best summarized as: decreased dialysis related morbidity/mortality, accepting lower kidney graft survival rates. Potential surgical complications of kidney transplantation include bleeding, superficial or deep wound complications (infection, hernia, lymphocele), ureteral anastomotic failure (leak or stenosis), graft thrombosis, need for reoperation and other issues such as cardiac complications, pneumonia, deep venous thrombosis, pulmonary embolism, post transplant diabetes and death. The potential for recurrent disease or need for retransplantation was also addressed. We discussed the possible need for ureteral stent (and subsequent removal), and the utility of protocol biopsy and laboratory studies to evaluate for rejection or recurrent disease. We discussed the risk of graft rejection, our center's average graft and patient survival rates, immunosuppression protocols, as well as the potential opportunity to participate in clinical trials.  We also discussed the average length of stay, recovery process, and posttransplant lab and monitoring protocol.  I emphasized the need for strict immunosuppression medication adherence and the potential for complications of immunosuppression such as skin cancer or lymphoma, as well as a very low but not zero risk of donor-derived disease  transmission risks (infection, cancer). Ms. Joyce asked good questions and her candidacy will be reviewed at our Multidisciplinary Selection Committee. Thank you for the opportunity to participate in Ms. Joyce's care.      Total time: 45 minutes        Juanita Hagen MD FACS  Assistant Professor of Surgery  Director, Living Kidney Donor Program.    ---------------------------------------------------------------------------------------------------    HPI: Ms. Joyce has Chronic renal failure due to polycystic kidney disease (PKD). The patient is non-diabetic.     Kidney size symptoms  Sore back but manageable  Some nerve pain  No early satiety  Does have nausea  No constipation  No SOB with laying flat     The patient is not on dialysis.    Has potential kidney donors:  Yes .  Interested in participation in paired exchange if a donor is willing: Yes     The patient has the following pertinent history:       No    Yes  Dialysis:    [x]      [] via:       Blood Transfusion                  [x]      []  Number of units:   Most recently:  Pregnancy:    []      [x] Number:   2    Previous Transplant:  [x]      [] Details:    Cancer    [x]      [] Comment:   Kidney stones   [x]      [] Comment:      Recurrent infections  [x]      []  Type:                  Bladder dysfunction  [x]      [] Cause:    Claudication   [x]      [] Distance:    Previous Amputation  [x]      [] Cause:     Chronic anticoagulation  [x]      [] Indication:   Mormonism  [x]      []      Past Medical History:   Diagnosis Date     Anxiety      Chronic kidney disease      Contraception 03/19/2009     Depression      Former tobacco use      Hypertension      Liver disease      PKD (polycystic kidney disease)      Polycystic kidney, unspecified type      Thyroid disease      Varicosities      Past Surgical History:   Procedure Laterality Date     LAPAROSCOPIC DECORTICATION CYST RENAL  01/01/2006     MYRINGOTOMY, INSERT TUBE(S), ADENOIDECTOMY,  COMBINED       PE TUBES  age 13     SURGICAL HISTORY OF -   2005    kidney surgery for cyst removal     Family History   Problem Relation Age of Onset     Lung Cancer Mother         lung     Hypertension Father      Breast Cancer Maternal Aunt      Breast Cancer Cousin      Breast Cancer Cousin      Cerebrovascular Disease Paternal Aunt 70     Diabetes No family hx of      C.A.D. No family hx of      Cancer - colorectal No family hx of      Prostate Cancer No family hx of      Social History     Socioeconomic History     Marital status:      Spouse name: Not on file     Number of children: Not on file     Years of education: Not on file     Highest education level: Not on file   Occupational History     Not on file   Tobacco Use     Smoking status: Former     Packs/day: 0.50     Years: 10.00     Pack years: 5.00     Types: Cigarettes     Quit date: 10/17/2022     Years since quittin.1     Smokeless tobacco: Never   Vaping Use     Vaping Use: Never used   Substance and Sexual Activity     Alcohol use: Yes     Comment: Very occasionally     Drug use: Yes     Types: Marijuana     Comment: Smokes once daily to induce appetite.     Sexual activity: Yes     Partners: Male     Birth control/protection: None   Other Topics Concern     Parent/sibling w/ CABG, MI or angioplasty before 65F 55M? No   Social History Narrative    ** Merged History Encounter **         Dairy/d 2 servings/d.     Caffeine 3 servings/d    Exercise 5-7 x week walking    Sunscreen used - Yes    Seatbelts used - Yes    Working smoke/CO detectors in the home - NO    Guns stored in the home - No    Self Breast Exams - Yes    Self Testicular Exam - NA    Eye Exam up to date - Yes    Dental Exam up to date - No    Pap Smear up to date - Yes    Mammogram up to date - No    PSA up to date - NA    Dexa Scan up to date - NA    Flex Sig / Colonoscopy up to date - NA    Immunizations up to date - No    Abuse: Current or Past(Physical, Sexual or  Emotional)- No    Do you feel safe in your environment - Yes    HONG HALLAnselmo BRIAN.    06/02/06         Social Determinants of Health     Financial Resource Strain: Not on file   Food Insecurity: Not on file   Transportation Needs: Not on file   Physical Activity: Not on file   Stress: Not on file   Social Connections: Not on file   Intimate Partner Violence: Not on file   Housing Stability: Not on file       ROS:   CONSTITUTIONAL:  No fevers or chills  EYES: negative for icterus  ENT:  negative for hearing loss, tinnitus and sore throat  RESPIRATORY:  negative for cough, sputum, dyspnea  CARDIOVASCULAR:  negative for chest pain Fatigue  GASTROINTESTINAL:  negative for nausea, vomiting, diarrhea or constipation  GENITOURINARY:  negative for incontinence, dysuria, bladder emptying problems  HEME:  No easy bruising  INTEGUMENT:  negative for rash and pruritus  NEURO:  Negative for headache, seizure disorder  Allergies:   Allergies   Allergen Reactions     Bactrim [Sulfamethoxazole W/Trimethoprim] Itching     Seasonal Allergies      Pcn [Penicillin G]      Medications:  Prescription Medications as of 12/27/2022       Rx Number Disp Refills Start End Last Dispensed Date Next Fill Date Owning Pharmacy    amLODIPine (NORVASC) 10 MG tablet  90 tablet 3 10/12/2022    [id number] - [place], [place] - [address] [facility] [facility]    Sig: Take 1 tablet (10 mg) by mouth daily    Class: E-Prescribe    Route: Oral    cyclobenzaprine (FLEXERIL) 10 MG tablet  30 tablet 3 8/15/2019    [facility]/pharmacy #[id number] [facility], [place] - [address] [place] [facility]    Sig: Take 1 tablet (10 mg) by mouth every evening as needed    Class: E-Prescribe    Route: Oral    gabapentin (NEURONTIN) 100 MG capsule  30 capsule 0 12/2/2022    [facility] #[id number] - [place], [place] - [address] AT [facility]    Sig: Take 1 capsule (100 mg) by mouth At Bedtime    Class:  "E-Prescribe    Route: Oral    lisinopril (ZESTRIL) 40 MG tablet  90 tablet 1 4/23/2020    [facility]/pharmacy #[id number] - [place], [place] - [address] [facility] AT [facility]    Sig: Take 1 tablet (40 mg) by mouth daily    Class: E-Prescribe    Route: Oral    sennosides (SENOKOT) 8.6 MG tablet            Sig: Take 1 tablet by mouth daily as needed for constipation    Class: Historical    Route: Oral    traZODone (DESYREL) 50 MG tablet  30 tablet 1 12/19/2022    [facility][id number] - [facility], [place] - [address] [facility] AT [facility]    Sig: Take 1 tablet (50 mg) by mouth At Bedtime    Class: E-Prescribe    Route: Oral    varenicline (CHANTIX) 1 MG tablet  60 tablet 2 10/12/2022    [facility] #[id number] - [facility], [place] - [address] [facility] AT [facility]    Sig: Take 1 tablet (1 mg) by mouth 2 times daily    Class: E-Prescribe    Route: Oral        Exam:     BP (!) 145/85   Pulse 61   Ht 1.626 m (5' 4\")   Wt 65.8 kg (145 lb)   LMP  (LMP Unknown)   SpO2 99%   BMI 24.89 kg/m    Appearance: in no apparent distress.   Skin: normal  Eyes:  no redness or discharge.  Sclera anicteric  Head and Neck: Normal, no rashes or jaundice  Respiratory: easy respirations, no audible wheezing.  Abdomen: rounded and distended, No surgical scars and right kidney extending down into the pelvis. Left kidney inferior pole extending just past ASIS.   Extremities: femoral 3+/3+, Edema, none  Neuro: without deficit   Psychiatric: Normal mood and affect    Diagnostics:   Recent Results (from the past 672 hour(s))   Hemoglobin    Collection Time: 11/30/22  8:17 AM   Result Value Ref Range    Hemoglobin 11.3 (L) 11.7 - 15.7 g/dL   Parathyroid Hormone Intact    Collection Time: 11/30/22  8:17 AM   Result Value Ref Range    Parathyroid Hormone Intact 61 15 - 65 pg/mL   Protein  random urine    Collection Time: 11/30/22  8:17 AM   Result Value Ref Range    Total Protein Urine mg/dL 54.8 " mg/dL    Total Protein UR MG/MG CR 1.44 (H) 0.00 - 0.20 mg/mg Cr    Creatinine Urine mg/dL 38.0 mg/dL   Renal panel    Collection Time: 11/30/22  8:17 AM   Result Value Ref Range    Sodium 137 133 - 144 mmol/L    Potassium 3.7 3.4 - 5.3 mmol/L    Chloride 107 94 - 109 mmol/L    Carbon Dioxide (CO2) 24 20 - 32 mmol/L    Anion Gap 6 3 - 14 mmol/L    Urea Nitrogen 40 (H) 7 - 30 mg/dL    Creatinine 3.75 (H) 0.52 - 1.04 mg/dL    Calcium 9.0 8.5 - 10.1 mg/dL    Glucose 105 (H) 70 - 99 mg/dL    Albumin 3.6 3.4 - 5.0 g/dL    Phosphorus 4.4 2.5 - 4.5 mg/dL    GFR Estimate 14 (L) >60 mL/min/1.73m2   Hepatitis B core antibody    Collection Time: 12/08/22  9:10 AM   Result Value Ref Range    Hepatitis B Core Antibody Total Nonreactive Nonreactive   Hepatitis B Surface Antibody    Collection Time: 12/08/22  9:10 AM   Result Value Ref Range    Hepatitis B Surface Antibody Instrument Value 1.26 <8.00 m[IU]/mL    Hepatitis B Surface Antibody Nonreactive    Hepatitis B surface antigen    Collection Time: 12/08/22  9:10 AM   Result Value Ref Range    Hepatitis B Surface Antigen Nonreactive Nonreactive   UA with Microscopic reflex to Culture    Collection Time: 12/08/22  9:10 AM    Specimen: Urine, Midstream   Result Value Ref Range    Color Urine Yellow Colorless, Straw, Light Yellow, Yellow    Appearance Urine Clear Clear    Glucose Urine Negative Negative mg/dL    Bilirubin Urine Negative Negative    Ketones Urine Negative Negative mg/dL    Specific Gravity Urine <=1.005 1.003 - 1.035    Blood Urine Trace (A) Negative    pH Urine 5.5 5.0 - 7.0    Protein Albumin Urine 30 (A) Negative mg/dL    Urobilinogen Urine 0.2 0.2, 1.0 E.U./dL    Nitrite Urine Negative Negative    Leukocyte Esterase Urine Trace (A) Negative   Varicella Zoster Virus Antibody IgG [[id number]]    Collection Time: 12/08/22  9:10 AM   Result Value Ref Range    VZV Vera IgG Instrument Value 1,235.0 <135.0 Index    Varicella Zoster Antibody IgG Positive    Treponema Abs  w Reflex to RPR and Titer [[id number]]    Collection Time: 12/08/22  9:10 AM   Result Value Ref Range    Treponema Antibody Total Nonreactive Nonreactive   HIV Antigen Antibody Combo Pretransplant    Collection Time: 12/08/22  9:10 AM   Result Value Ref Range    HIV Antigen Antibody Combo Pretransplant Nonreactive Nonreactive   Hepatitis C antibody [[id number]]    Collection Time: 12/08/22  9:10 AM   Result Value Ref Range    Hepatitis C Antibody Nonreactive Nonreactive   EBV Capsid Antibody IgG [[id number]]    Collection Time: 12/08/22  9:10 AM   Result Value Ref Range    EBV Capsid Vera IgG Instrument Value >750.0 (H) <18.0 U/mL    EBV Capsid Antibody IgG Positive (A) No detectable antibody.   CMV Antibody IgG [[id number]]    Collection Time: 12/08/22  9:10 AM   Result Value Ref Range    CMV Vera IgG Instrument Value 9.20 (H) <0.60 U/mL    CMV Antibody IgG Positive, suggests recent or past exposure. (A) No detectable antibody.    Thrombin time [[id number]]    Collection Time: 12/08/22  9:10 AM   Result Value Ref Range    Thrombin Time 16.6 13.0 - 19.0 Seconds   Partial thromboplastin time [LAB56]    Collection Time: 12/08/22  9:10 AM   Result Value Ref Range    aPTT 35 22 - 38 Seconds   Lupus Anticoagulant Panel [[id number]]    Collection Time: 12/08/22  9:10 AM   Result Value Ref Range    PTT Ratio 1.46 (H) <1.21    Platelet Neutralization -7 <=-3 Seconds    PTT 1:2 MIX 42 31 - 45 Seconds    DRVVT Screen Ratio 0.93 <1.08    Lupus Result Negative Negative    Lupus Interpretation       INR is normal.  APTT ratio is elevated.  Platelet Neutralization is negative.  APTT 1:2 Mix is normal.  DRVVT Screen ratio is normal.  Thrombin time is normal.  NEGATIVE TEST; A LUPUS ANTICOAGULANT WAS NOT DETECTED IN THIS SPECIMEN WITHIN THE LIMITS OF THE TESTING REPERTOIRE.  If the clinical picture is strongly suggestive of an antiphospholipid syndrome, recommend anticardiolipin and beta-2-glycoprotein (IgG and IgM) antibody tests.  Platelet  Neutralization and APTT 1:2 Mix are suggestive of factor deficiency.   Recommend factors 8, 9, 11 and 12 (factors VIII, IX, XI, and XII) levels if clinically indicated.    Loree De La Cruz MD, PhD  [facility]           INR [[id number]]    Collection Time: 12/08/22  9:10 AM   Result Value Ref Range    INR 0.97 0.85 - 1.15   Factor 2 and 5 mutation analysis    Collection Time: 12/08/22  9:10 AM   Result Value Ref Range    METHODOLOGY       The regions of genomic DNA containing the F5 gene mutation R506Q(1691G>A) and the Factor 2 (Prothrombin [id number]) gene mutation were simultaneously amplified using the polymerase chain reaction. The amplified products were digested with restriction endonuclease TaqI and products were analyzed by gel electrophoresis.       RESULTS         Factor V 1691G>A ([place])  RESULTS:  Mutation analyzed: 1691G>A  Factor V 1691G>A ([place]iden)  Interpretation:  ABSENT  Factor V 1691G>A ([place]iden) mutation  genotype:  G/G    FACTOR 2/PROTHROMBIN RESULTS:  Mutation analyzed: 58145F>A  Factor 2 Mutation Interpretation:  ABSENT  Factor 2 Mutation genotype:  G/G        INTERPRETATION       The patient is negative for the Factor V 1691G>A ([place]) and negative for the Factor 2 mutation.  (Electronically signed by: Pavan Garcia MD December 12, 2022 5:27 PM)      COMMENTS       If a patient is the recipient of an allogeneic bone marrow transplant, this test must be done on a pre-transplant sample or buccal swab.  A previous allogeneic bone marrow transplant will interfere with test results.  Call the [facility] ([phone]) for instructions on sample collection for these patients.      DISCLAIMER       This test was developed and its performance characteristics determined by [facility]. It has not been cleared or approved by the FDA. The laboratory is regulated under CLIA as qualified to perform high-complexity testing. This test is used for clinical  purposes. It should not be regarded as investigational or for research.    A resident/fellow in an accredited training program was involved in the selection of testing, review of laboratory data, and/or interpretation of this case.  I, as the senior physician, attest that I: (i) confirmed appropriate testing, (ii) examined the relevant raw data for the specimen(s); and (iii) rendered or confirmed the interpretation(s).      FACTOR 2 INTERPRETATION         Factor 2 Mutation Interpretation:  ABSENT      FACTOR V INTERPRETATION       Factor V 1691G>A ([place])  Interpretation:  ABSENT      Specimen Description       Blood: ACD     Cardiolipin Vera IgG and IgM [LAB 6836]    Collection Time: 12/08/22  9:10 AM   Result Value Ref Range    Cardiolipin Vera IgG Instrument Value <2.0 <10.0 GPL-U/mL    Cardiolipin Antibody IgG Negative Negative    Cardiolipin Vera IgM Instrument Value <2.0 <10.0 MPL-U/mL    Cardiolipin Antibody IgM Negative Negative   Comprehensive metabolic panel [LAB17]    Collection Time: 12/08/22  9:10 AM   Result Value Ref Range    Sodium 137 133 - 144 mmol/L    Potassium 3.4 3.4 - 5.3 mmol/L    Chloride 104 94 - 109 mmol/L    Carbon Dioxide (CO2) 24 20 - 32 mmol/L    Anion Gap 9 3 - 14 mmol/L    Urea Nitrogen 52 (H) 7 - 30 mg/dL    Creatinine 3.58 (H) 0.52 - 1.04 mg/dL    Calcium 9.2 8.5 - 10.1 mg/dL    Glucose 99 70 - 99 mg/dL    Alkaline Phosphatase 62 40 - 150 U/L    AST 10 0 - 45 U/L    ALT 15 0 - 50 U/L    Protein Total 8.3 6.8 - 8.8 g/dL    Albumin 3.6 3.4 - 5.0 g/dL    Bilirubin Total 0.3 0.2 - 1.3 mg/dL    GFR Estimate 15 (L) >60 mL/min/1.73m2   Blood Group A Subtype    Collection Time: 12/08/22  9:10 AM   Result Value Ref Range    A1 Antigen Type Negative     SPECIMEN EXPIRATION DATE [id number]    Antibody titer red cell [[id number]]    Collection Time: 12/08/22  9:10 AM   Result Value Ref Range    Anti-B IgG Titer 4     Anti-B IgM Titer 8     SPECIMEN EXPIRATION DATE [id number]     ANTIBODY  TITER IGM SCREEN Negative    ABO and Rh    Collection Time: 12/08/22  9:10 AM   Result Value Ref Range    ABO/RH(D) A POS     SPECIMEN EXPIRATION DATE [id number]    Parathyroid Hormone Intact    Collection Time: 12/08/22  9:10 AM   Result Value Ref Range    Parathyroid Hormone Intact 44 15 - 65 pg/mL   Protein  random urine    Collection Time: 12/08/22  9:10 AM   Result Value Ref Range    Total Protein Urine mg/dL 44.1 mg/dL    Total Protein UR MG/MG CR 1.34 (H) 0.00 - 0.20 mg/mg Cr    Creatinine Urine mg/dL 32.9 mg/dL   Quantiferon TB Gold Plus Grey Tube    Collection Time: 12/08/22  9:10 AM    Specimen: Peripheral Blood   Result Value Ref Range    Quantiferon Nil Tube 0.05 IU/mL   Quantiferon TB Gold Plus Green Tube    Collection Time: 12/08/22  9:10 AM    Specimen: Peripheral Blood   Result Value Ref Range    Quantiferon TB1 Tube 0.05 IU/mL   Quantiferon TB Gold Plus Yellow Tube    Collection Time: 12/08/22  9:10 AM    Specimen: Peripheral Blood   Result Value Ref Range    Quantiferon TB2 Tube 0.06    Quantiferon TB Gold Plus Purple Tube    Collection Time: 12/08/22  9:10 AM    Specimen: Peripheral Blood   Result Value Ref Range    Quantiferon Mitogen 10.00 IU/mL   CBC with platelets and differential    Collection Time: 12/08/22  9:10 AM   Result Value Ref Range    WBC Count 6.4 4.0 - 11.0 10e3/uL    RBC Count 3.55 (L) 3.80 - 5.20 10e6/uL    Hemoglobin 10.9 (L) 11.7 - 15.7 g/dL    Hematocrit 32.5 (L) 35.0 - 47.0 %    MCV 92 78 - 100 fL    MCH 30.7 26.5 - 33.0 pg    MCHC 33.5 31.5 - 36.5 g/dL    RDW 12.5 10.0 - 15.0 %    Platelet Count 218 150 - 450 10e3/uL    % Neutrophils 61 %    % Lymphocytes 29 %    % Monocytes 6 %    % Eosinophils 3 %    % Basophils 1 %    % Immature Granulocytes 0 %    Absolute Neutrophils 3.9 1.6 - 8.3 10e3/uL    Absolute Lymphocytes 1.9 0.8 - 5.3 10e3/uL    Absolute Monocytes 0.4 0.0 - 1.3 10e3/uL    Absolute Eosinophils 0.2 0.0 - 0.7 10e3/uL    Absolute Basophils 0.0 0.0 - 0.2 10e3/uL     Absolute Immature Granulocytes 0.0 <=0.4 10e3/uL   Adult Type and Screen    Collection Time: 12/08/22  9:10 AM   Result Value Ref Range    ABO/RH(D) A POS     Antibody Screen Negative Negative    SPECIMEN EXPIRATION DATE [id number]    Phosphorus    Collection Time: 12/08/22  9:10 AM   Result Value Ref Range    Phosphorus 4.5 2.5 - 4.5 mg/dL   Urine Microscopic    Collection Time: 12/08/22  9:10 AM   Result Value Ref Range    Bacteria Urine Few (A) None Seen /HPF    RBC Urine None Seen 0-2 /HPF /HPF    WBC Urine 5-10 (A) 0-5 /HPF /HPF    Squamous Epithelials Urine Few (A) None Seen /LPF    WBC Clumps Urine Present (A) None Seen /HPF   Quantiferon TB Gold Plus    Collection Time: 12/08/22  9:10 AM    Specimen: Peripheral Blood   Result Value Ref Range    Quantiferon-TB Gold Plus Negative Negative    TB1 Ag minus Nil Value 0.00 IU/mL    TB2 Ag minus Nil Value 0.01 IU/mL    Mitogen minus Nil Result 9.95 IU/mL    Nil Result 0.05 IU/mL   HLA-ABC Typing High Resolution    Collection Time: 12/08/22  9:10 AM   Result Value Ref Range    ABTEST METHOD NGS     hiresA-1 A*01:01     hiresA-1Equiv 1     hiresA-2 A*29:02     hiresA-2Equiv 29     hiresB-1 B*08:01     hiresB-1Equiv 8     hiresB-2 B*14:02     hiresB-2Equiv 65     hiresC-1 C*07:01     hiresC-1Equiv 7     hiresC-2 C*08:02     hiresC-2Equiv 8     hiresBw-1 Bw*6    HLA-DR Typing High Resolution    Collection Time: 12/08/22  9:10 AM   Result Value Ref Range    DRhiresTestM NGS     hiresDPA1-1 DPA1*01:03     hiresDPA1-2 DPA1*02:01     hiresDPB1-1 DPB1*01:01     hiresDPB1-2 DPB1*04:01     [facility] DQA1*05:01     hiresDQB1-1 DQB1*02:01     hiresDQB1-1Equiv 2     hiresDRB1-1 DRB1*03:01     [facility]Equiv 17     hiresDRB3-1 DRB3*01:01     [facility] 52     DRB3 locus NMDP FJKD 01/91     hiresDRB3-2 DRB3*02:02     [facility]     DRB3 NMDP JF 02/133    HLA Vera Class I, Single Antigen    Collection Time: 12/08/22  9:10 AM   Result Value Ref Range    SA  1 TEST METHOD SA EDTA FCS     SA 1 CELL Class I     SA1 HI RISK MARIBETH       A:23 24 25 32 B:27 37 38 44 49 51 52 53 57 58 59 63 77 Cw:2 4 5 6 15 17 18    SA1 MOD RISK MARIBETH B:13 47     SA 1  COMMENTS        HLA PRA Test performed by modified testing procedure that may also include pretreatment of serum. Pretreatment may be the addition of fetal calf serum, EDTA, and/or adsorption.  High-risk, MFI > 3,000.  Mod-risk, -3,000.   HLA Maribeth Class II, Single Antigen    Collection Time: 12/08/22  9:10 AM   Result Value Ref Range    SA 2 TEST METHOD SA EDTA FCS     SA 2 CELL Class II     SA2 HI RISK MARIBETH DQ:5 6 7 8 9     SA2 MOD RISK MARIBETH None     SA 2 COMMENTS        HLA PRA Test performed by modified testing procedure that may also include pretreatment of serum. Pretreatment may be the addition of fetal calf serum, EDTA, and/or adsorption.  High-risk, MFI > 3,000.  Mod-risk, -3,000.   HLA Maribeth, CPRA    Collection Time: 12/08/22  9:10 AM   Result Value Ref Range    PROTOCOL CUTOFF Plan A, 500 mfi cumulative     UNOS CPRA 97     UNACCEPTABLE ANTIGENS       A:23 24 25 32 B:13 27 37 38 44 47 49 51 52 53 57 58 59 63 77 DQ:5 6 7 8 9   Pap screen with HPV - recommended age 30 - 65 years    Collection Time: 12/19/22  7:30 AM   Result Value Ref Range    Interpretation        Negative for Intraepithelial Lesion or Malignancy (NILM)    Comment         Papanicolaou Test Limitations:  Cervical cytology is a screening test with limited sensitivity, and regular screening is critical for cancer prevention.  Pap tests are primarily effective for the diagnosis/prevention of squamous cell carcinoma, not adenocarcinoma or other cancers.        Specimen Adequacy       Satisfactory for evaluation, endocervical/transformation zone component present    Clinical Information       post-menopausal      Reflex Testing Yes regardless of result     Previous Abnormal?       No      Performing Labs       The technical component of this testing was  completed at [facility]     HPV High Risk Types DNA Cervical    Collection Time: 12/19/22  7:30 AM   Result Value Ref Range    Other HR HPV Negative Negative    HPV16 DNA Negative Negative    HPV18 DNA Negative Negative    FINAL DIAGNOSIS       This patient's sample is negative for HPV DNA.        This test was developed and its performance characteristics determined by the [facility]. It has not been cleared or approved by the FDA. The laboratory is regulated under CLIA as qualified to perform high-complexity testing. This test is used for clinical purposes. It should not be regarded as investigational or for research.    METHODOLOGY: The Roche Paul 4800 system uses automated extraction, simultaneous amplification of HPV (L1 region) and beta-globin, followed by real time detection of fluorescent labeled HPV and beta globin using specific oligonucleotide probes. The test specifically identifies types HPV 16 DNA and HPV 18 DNA while concurrently detecting the rest of the high risk types (31, 33, 35, 39, 45, 51, 52, 56, 58, 59, 66 or 68).    COMMENTS: This test is not intended for use as a screening device for woman under age 30 with normal cervical cytology. Results should be correlated with cytologic and histologic findings. Close clinical followup is recommended.       Echocardiogram Complete    Collection Time: 12/27/22  6:58 AM   Result Value Ref Range    LVEF  55-60%    EKG 12-lead, tracing only [EKG1]    Collection Time: 12/27/22  7:17 AM   Result Value Ref Range    Systolic Blood Pressure  mmHg    Diastolic Blood Pressure  mmHg    Ventricular Rate 57 BPM    Atrial Rate 57 BPM    MN Interval 146 ms    QRS Duration 98 ms     ms    QTc 449 ms    P Axis 72 degrees    R AXIS 67 degrees    T Axis 67 degrees    Interpretation ECG       Sinus bradycardia  Otherwise normal ECG  No previous  ECGs available       UN CPRA   Date Value Ref Range Status   12/08/2022 97  Final       Again, thank you for allowing me to participate in the care of your patient.        Sincerely,        Juanita Hagen MD

## 2022-12-28 ENCOUNTER — VIRTUAL VISIT (OUTPATIENT)
Dept: NEPHROLOGY | Facility: CLINIC | Age: 52
End: 2022-12-28
Payer: COMMERCIAL

## 2022-12-28 VITALS — WEIGHT: 145 LBS | SYSTOLIC BLOOD PRESSURE: 136 MMHG | BODY MASS INDEX: 24.89 KG/M2 | DIASTOLIC BLOOD PRESSURE: 82 MMHG

## 2022-12-28 DIAGNOSIS — Q61.3 POLYCYSTIC KIDNEY: Primary | ICD-10-CM

## 2022-12-28 LAB
DLCOUNC-%PRED-PRE: 86 %
DLCOUNC-PRE: 17.93 ML/MIN/MMHG
DLCOUNC-PRED: 20.8 ML/MIN/MMHG
ERV-%PRED-PRE: 28 %
ERV-PRE: 0.26 L
ERV-PRED: 0.91 L
EXPTIME-PRE: 6.09 SEC
FEF2575-%PRED-POST: 84 %
FEF2575-%PRED-PRE: 60 %
FEF2575-POST: 2.07 L/SEC
FEF2575-PRE: 1.48 L/SEC
FEF2575-PRED: 2.46 L/SEC
FEFMAX-%PRED-PRE: 74 %
FEFMAX-PRE: 4.91 L/SEC
FEFMAX-PRED: 6.62 L/SEC
FEV1-%PRED-PRE: 84 %
FEV1-PRE: 2.11 L
FEV1FEV6-PRE: 71 %
FEV1FEV6-PRED: 82 %
FEV1FVC-PRE: 71 %
FEV1FVC-PRED: 81 %
FEV1SVC-PRE: 69 %
FEV1SVC-PRED: 73 %
FIFMAX-PRE: 2.81 L/SEC
FRCPLETH-%PRED-PRE: 112 %
FRCPLETH-PRE: 3.04 L
FRCPLETH-PRED: 2.69 L
FVC-%PRED-PRE: 95 %
FVC-PRE: 2.97 L
FVC-PRED: 3.1 L
IC-%PRED-PRE: 112 %
IC-PRE: 2.82 L
IC-PRED: 2.51 L
QUANTIFERON MITOGEN: 10 IU/ML
QUANTIFERON NIL TUBE: 0.05 IU/ML
QUANTIFERON TB1 TUBE: 0.06 IU/ML
QUANTIFERON TB2 TUBE: 0.05
RVPLETH-%PRED-PRE: 155 %
RVPLETH-PRE: 2.78 L
RVPLETH-PRED: 1.79 L
TLCPLETH-%PRED-PRE: 118 %
TLCPLETH-PRE: 5.85 L
TLCPLETH-PRED: 4.94 L
VA-%PRED-PRE: 107 %
VA-PRE: 5.23 L
VC-%PRED-PRE: 89 %
VC-PRE: 3.07 L
VC-PRED: 3.42 L

## 2022-12-28 PROCEDURE — 99214 OFFICE O/P EST MOD 30 MIN: CPT | Mod: 95 | Performed by: INTERNAL MEDICINE

## 2022-12-28 ASSESSMENT — PAIN SCALES - GENERAL: PAINLEVEL: SEVERE PAIN (6)

## 2022-12-28 NOTE — RESULT ENCOUNTER NOTE
Afia Joyce,    Attached are your test results.  -Cholesterol levels (LDL,HDL, Triglycerides) are normal.  ADVISE: rechecking in 1 year.   -TSH (thyroid stimulating hormone) level is normal which indicates normal thyroid function.   Please contact us if you have any questions.    Tianna Vieyra, CNP

## 2022-12-28 NOTE — PROGRESS NOTES
Sheri Joyce is a 52 year old female who is being evaluated via a billable video visit.      How would you like to obtain your AVS? [employer]  If the video visit is dropped, the invitation should be resent by: Text to cell phone: [phone]  Will anyone else be joining your video visit? No      Location of patient:   If not at home address below, please ask where they are in case of an emergency situation arises during the appointment.  [address]  [facility] [address]      Start time:2.05 pm  Stop time: 2.15 pm            Nephrology Clinic follow up  12/28/22    Sheri Joyce MRN:[id number] YOB: 1970  Date of Admission:(Not on file)  Primary care provider: Tianna Vieyra  Requesting physician: No att. providers found      REASON FOR CONSULT: ADPKD    HISTORY OF PRESENT ILLNESS:    HPI: Sheri Joyce is a 49 year old female who presents for evaluation of ADPKD  She first found out about her kidney disease 25 years ago when she had an US done in the ER after presenting for abdominal pain. Her first pregnancy was before this and uneventful however her second pregnancy was complicated with hypertension and she was told to be on bed rest. She was asked to be on antihypertensives however she did not have insurance and had not been taking any antihypertensives. She started taking Lisinopril 6 months ago. She does not check her blood pressures at home. She has had cyst rupture causing abdominal pain last October.   She reports that her daughter also has cysts in her kidneys. She denies any family history of kidney diseases particularly polycystic kidney disease.   Patient denies any LE edema, exertional dyspnea/orthopnea/PND, dizziness, lightheadedness, nausea, vomiting or diarrhea.     PAST MEDICAL HISTORY:  Reviewed with patient on 12/28/2022   As per HPI    MEDICATIONS:  Reviewed with the patient in detail    ALLERGIES:    Reviewed with the patient in detail    REVIEW OF  SYSTEMS:  A comprehensive of systems was negative except as noted above.    SOCIAL HISTORY:   Reviewed with patient, no smoking and no alcohol use     FAMILY MEDICAL HISTORY:   Reviewed, no family history of need for dialysis, transplant or CKD    PHYSICAL EXAM:   Vital signs:/82   Wt 65.8 kg (145 lb)   LMP  (LMP Unknown)   BMI 24.89 kg/m       Gen: Appears well  Neck: No JVD  Lungs: CTA  CVS: S1S2 normal, no murmurs heard  LE: no edema  Skin: no rashes  CNS: Grossly normal       Interval history: She has been feeling poorly for sometime now. She says she is really tired and that she does not really have an appetite. She denies any LE edema or shortness of breath. She has stopped taking her lisinopril for a long time.     Interval history 12/28 : She continues to have low energy and appetite. She reports that she now has difficulty falling asleep and staying asleep. She does develop restless legs at night. I prescribed 100 mg gabapentin which she says did not make a difference. She has met with transplant surgery and will have her PD catheter placed 1/11 and start her training on 1/25 at [facility]. She denies any other concerns at this time.    Assessment/Plan:     # ADPKD  # CKD stage 3  # Hypertension    She has a significantly steep decline in her gfr over the last 2 years. Her eGFR is now 13.  UA with protein, no hematuria. Urine P/Cr ratio of 0.54 g/g. She has had complications such as recurrent UTI's in the past, and cyst rupture. She has had deroofing procedure done in 2006. She has had MRI brain in 2005 which was negative for aneurysms.   She was started on Lisinopril a couple of years ago but has stopped taking it for some time. She also has been significantly hypertensive. The last time, we had discussed tolvaptan but she was lost to follow up during the pandemic. She then missed another clinic apt, and we eventually obtained a CT abdomen to assess her tolvaptan candidacy. Her martha  adjusted calculated total kidney volume is 1086 ml/m and she falls in class 1C. She would have been a good candidate for Tolvaptan however with her eGFR now being 13, would not have any real benefit. I do not see other reversible factors for her declining gfr.     As mentioned above, her eGFR declined from 36-->13 since 2020. I discussed that she will likely need to go on dialysis while we work her up for transplant. 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 She is interested in exploring PD.   She did have BP above the goal but have now improved with amlodipine 10 mg daily and are at goal of <140/90 mm of Hg. She denies any symptoms concerning for hypervolemia.     --her inability to sleep and restless legs are uremic symptoms in my opinion and I have expedited her PD catheter placement. In the meantime, I asked her to increase her gabapentin to 300 mg Q HS and see if that makes any difference.   --she is also in process of getting a living donor approved. They need MRI mu to r/o aneurysm which I will order.   --f/up in clinic in 3 weeks w/labs if PD catheter still not placed.    Jayne Davis MD

## 2023-01-03 ENCOUNTER — PATIENT OUTREACH (OUTPATIENT)
Dept: NEPHROLOGY | Facility: CLINIC | Age: 53
End: 2023-01-03

## 2023-01-03 NOTE — PROGRESS NOTES
[facility] Admissions process initiated. Submitted pt records. PD cath to be placed on 1/11/23 with flush and dressing change to occur 1 week post. Patient will be followed by Dr. Boswell at the [facility] PD unit.     Valorie Louis RN

## 2023-01-06 ENCOUNTER — TELEPHONE (OUTPATIENT)
Dept: TRANSPLANT | Facility: CLINIC | Age: 53
End: 2023-01-06

## 2023-01-06 NOTE — TELEPHONE ENCOUNTER
Called pt today and reached her VM with a recording stating voicemailbox  Is full. Sent My Chart message then as well.     Spoke with pt today when she called me. We made appt an appt for me to call her again on Monday to review transplant eval status.

## 2023-01-09 ENCOUNTER — TELEPHONE (OUTPATIENT)
Dept: TRANSPLANT | Facility: CLINIC | Age: 53
End: 2023-01-09

## 2023-01-09 ENCOUNTER — DOCUMENTATION ONLY (OUTPATIENT)
Dept: TRANSPLANT | Facility: CLINIC | Age: 53
End: 2023-01-09

## 2023-01-09 NOTE — TELEPHONE ENCOUNTER
Called patient today and spoke with her. Reviewed [facility] education today, please see my other note for documentation of this. Reviewed with pt I will now add her onto the kidney transplant waitlist on INACTIVE status while she completes the remainder of her appts. Explained she will now be transferred to the waitlist coordinator Team with Dara PAULINO as her new coordinator assisted by Brianna BRIAN. Provided our Office Number for her to contact them.     Listed pt today in UNOS on kidney alone waitlist on INACTIVE status due to an incomplete evaluation. Pt is not yet on dialysis. Pt does qualify for wait time with a GFR of 15 done on 12/08/2022. Pt listed with option of KPDI>85% offers, consent signed for this. Generated listing letter and PRA order today in Epic - electronically routed to pt/ providers.

## 2023-01-09 NOTE — LETTER
PHYSICIAN ORDER   ALA/PRA BLOOD    DATE & TIME ISSUED: 2023 4:55 PM  PATIENT NAME: Sheri Joyce   : 1970     Formerly [facility] MR#  [id number]     DIAGNOSIS/ICD-10 CODE: Awaiting Organ transplant [Z76.82}   EXPIRES: (1 YEAR AFTER DATE ISSUED)  EVERY 12 weeks / 3 months   1. Please draw 20ml of blood in red top (plain) tube for Antileukocyte Antibody (ALA or PRA).   2. Label tubes with the patient s name, complete lab slip.         3. Mailers, lab slips with instructions are sent to patient separately.      4. Call the Outreach Lab at [phone] to reorder mailers.       5. Mail blood to (this address is also on the mailers):    IMMUNOLOGY LABORATORY   [facility]   Room [phone] [address]  [address]        Santi Cardenas MD  Surgical Director, Kidney Transplantation

## 2023-01-09 NOTE — LETTER
2023    Sheri Cohen [place]  [address] [place] [address]      Dear Ms. Joyce,    This letter is sent to confirm that you are a candidate in the kidney transplant program at the [facility].  You were placed on the kidney INACTIVE  waitlist on 2023.  This means you will accumulate waiting time but not receive  donor calls. You have been placed on INACTIVE status due to an incomplete transplant evaluation.     Per our routine office workflow, you will now be transferred to the kidney transplant coordinator waitlist team. Your new coordinator is Dara PAULINO assisted by Brianna BRIAN. Either can be reached by calling our Main Office Number at [phone].  Please do let Dara know as soon as the pending items of your evaluation are completed. Dara will then have the results reviewed at the Multidisciplinary Selection Committee for approval.      Items we will need from you:      We will receive approval from your insurance company for the transplant procedure when your status is ready to be changed to ACTIVE.  It is critical that you notify us if there is any change in your insurance.  It is also important that you familiarize yourself with the details of your specific insurance policy.  Our patient  is available to assist you if you should have any questions regarding your coverage.      An ALA or PRA blood sample will need to be drawn every 3 months to match you with  donors or any potential living donors when your status is ACTIVE on the list. You do not need to have these samples drawn now because you are currently on INACTIVE status. When you do need to have these drawn in the future, you can go to any [facility] Lab by simply making a lab appointment.   In the case you are going to an outside lab, such as dialysis in the future, special mailing boxes (called mailers) will be sent to you directly from the  Outreach Department.  You should take the physician order form and the  to your outside laboratory when it is time to for this testing to be done.  Additional mailers can be obtained by calling the Transplant Office and asking to speak to a kidney .      During this waiting period, we may request additional periodic laboratory tests with your primary physician.  It will be your responsibility to remind your physician to forward your results to the Transplant Office.      We need to be kept informed of any changes in your medical condition such as:    o changes in your medications,   o significant changes in your health  o significant infections (such as pneumonia or abscesses)  o blood transfusions  o any condition which requires hospitalization  o any surgery      Remember to complete any routine cancer screening tests required before your transplant.  This includes colonoscopy; prostrate screening for men, and mammogram and gynecologic testing for women, as well as dental work.  Your primary care clinic can assist you with arranging for these exams.  Remind your caregivers to forward copies of the records and final reports.    We want you to know that our program has physician and surgeon coverage 24 hours a day, 365 days a year. In addition, our transplant surgeons and physicians will not be on call for two or more transplant programs more than 30 miles apart unless the circumstances have been reviewed and approved by the [employer] Organ Sharing (UNOS) Membership and Professional Standards Committee (MPSC). Finally, our primary physician and primary surgeons are not designated as the primary surgeon or primary physician at more than 1 transplant hospital. If this coverage changes or there are substantial program changes, you will be notified in writing by letter.     Attached is a letter from UNOS that describes the services and information offered to patients by UNOS and  the Organ Procurement and Transplantation Network (OPTN).    We appreciate having had the opportunity to participate in your care.  If you have questions, please feel free to call the Transplant Office at [phone] or [phone].      Sincerely,       Kidney Transplant Program    Enclosures: ALA/KRISTIN Physician Order, Telephone Contact List, UNOS Letter, Waitlist Information Update and While You Are Waiting  CC: Tianna Vieyra NP, Dr. Jayne Davis                                   The Organ Procurement and Transplantation Network  Toll-free patient services line:     Your resource for organ transplant information    If you have a question regarding your own medical care, you always should call your transplant hospital first. However, for general organ transplant-related information, you can call the Organ Procurement and Transplantation Network (OPTN) toll-free patient services line at [phone]- [phone]. Anyone, including potential transplant candidates, candidates, recipients, family members, friends, living donors, and donor family members, can call this number to:          Talk about organ donation, living donation, the transplant process, the donation process, and transplant policies.    Get a free patient information kit with helpful booklets, waiting list and transplant information, and a list of all transplant hospitals.    Ask questions about the OPTN website (https://optn.transplant.hrsa.gov/), the [employer] Network for Organ Sharing s (UNOS) website (https://unos.org/), or the UNOS website for living donors and transplant recipients. (https://www.transplantliving.org/).    Learn how the OPTN can help you.    Talk about any concerns that you may have with a transplant hospital.    The [facility] s transplant system, the OPTN, is managed under federal contract by the [employer] Network for Organ Sharing (UNOS), which is a non-profit charitable organization. The OPTN helps create and define organ sharing policies that  make the best use of donated organs. This process continuously evaluating new advances and discoveries so policies can be adapted to best serve patients waiting for transplants. To do so, the OPTN works closely with transplant professionals, transplant patients, transplant candidates, donor families, living donors, and the public. All transplant programs and organ procurement organizations throughout the country are OPTN members and are obligated to follow the policies the OPTN creates for allocating organs.    The OPTN also is responsible for:      Providing educational material for patients, the public, and professionals.    Raising awareness of the need for donated organs and tissue.    Coordinating organ procurement, matching, and placement.    Collecting information about every organ transplant and donation that occurs in the [place].    Remember, you should contact your transplant hospital directly if you have questions or concerns about your own medical care including medical records, work-up progress, and test results.    We are not your transplant hospital, and our staff will not be able to answer questions about your case, so please keep your transplant hospital s phone number handy.    However, while you research your transplant needs and learn as much as you can about transplantation and donation, we welcome your call to our toll-free patient services line at [phone].          Updated 4/1/2019

## 2023-01-09 NOTE — PROGRESS NOTES
Kidney Transplant Evaluation - 11/15/2022  Sheri Navaidalia confirmed she has viewed both pre kidney eval parts 1 and 2 onThe [facility] website. Pt states she doesn't have any questions.   Content reviewed:    Living Donation and how to access that program. Pt confirmed she has the donor registration website and our Office phone # to provide to pt's.     Paired exchange, Direct donation. Pt expressed good understanding of both.     Kidney Donor Profile Index (KDPI). Pt already signed with a yes response     Waiting list issues (right to decline without penalty, high PHS risk donors, what to expect when called with an offer). Reviewed her PRA is 97 so if she declines an offer she may not get another very soon.     Hospital experience, length of stay, need to stay locally post-discharge (2-4 weeks)    Surgical options (with pictures)                             Post-surgery lifting and driving restrictions     Post-transplant routines, frequency of lab work and clinic visits. Pt plans to have post labs done a [facility]. Pt's daughter will move in and assist her post op kidney transplant.     Need to stay locally post-discharge (2-4 weeks) Lived in [place].     Role of Transplant Coordinator    Participants were informed of the benefits of transplant as well as potential risks such as infection, cancer, and death.  The need for total adherence with immunosuppression medications and following transplant regimens was stressed.  The overall evaluation/approval/listing process was reviewed.        The patient was provided with the following documents:  What You Need to Know About a Kidney Transplant  Adult Kidney Transplant - A Guide for Patients  SRTR Data Sheet - Kidney  Brochure - Kidney Allocation  Brochure - Multiple Listing and Waiting Time Transfer  What Every Patient Needs to Know (UNOS)  UNOS Facts and Figures  Finding a Donor    Pt stating good understanding of all.

## 2023-01-10 ENCOUNTER — VIRTUAL VISIT (OUTPATIENT)
Dept: PSYCHOLOGY | Facility: CLINIC | Age: 53
End: 2023-01-10
Attending: INTERNAL MEDICINE
Payer: COMMERCIAL

## 2023-01-10 ENCOUNTER — DOCUMENTATION ONLY (OUTPATIENT)
Dept: TRANSPLANT | Facility: CLINIC | Age: 53
End: 2023-01-10

## 2023-01-10 ENCOUNTER — ANESTHESIA EVENT (OUTPATIENT)
Dept: SURGERY | Facility: CLINIC | Age: 53
End: 2023-01-10
Payer: COMMERCIAL

## 2023-01-10 ENCOUNTER — TELEPHONE (OUTPATIENT)
Dept: TRANSPLANT | Facility: CLINIC | Age: 53
End: 2023-01-10

## 2023-01-10 DIAGNOSIS — N18.5 CHRONIC KIDNEY DISEASE, STAGE 5 (H): ICD-10-CM

## 2023-01-10 DIAGNOSIS — N18.9 CHRONIC KIDNEY DISEASE (CKD): ICD-10-CM

## 2023-01-10 DIAGNOSIS — F41.1 GENERALIZED ANXIETY DISORDER: Primary | ICD-10-CM

## 2023-01-10 DIAGNOSIS — Z76.82 ORGAN TRANSPLANT CANDIDATE: ICD-10-CM

## 2023-01-10 PROCEDURE — 90837 PSYTX W PT 60 MINUTES: CPT | Mod: GT | Performed by: SOCIAL WORKER

## 2023-01-10 ASSESSMENT — COLUMBIA-SUICIDE SEVERITY RATING SCALE - C-SSRS
2. HAVE YOU ACTUALLY HAD ANY THOUGHTS OF KILLING YOURSELF?: NO
1. IN THE PAST MONTH, HAVE YOU WISHED YOU WERE DEAD OR WISHED YOU COULD GO TO SLEEP AND NOT WAKE UP?: NO
6. IN YOUR LIFETIME, HAVE YOU EVER DONE ANYTHING, STARTED TO DO ANYTHING, OR PREPARED TO DO ANYTHING TO END YOUR LIFE?: NO

## 2023-01-10 ASSESSMENT — ANXIETY QUESTIONNAIRES
2. NOT BEING ABLE TO STOP OR CONTROL WORRYING: NEARLY EVERY DAY
6. BECOMING EASILY ANNOYED OR IRRITABLE: SEVERAL DAYS
8. IF YOU CHECKED OFF ANY PROBLEMS, HOW DIFFICULT HAVE THESE MADE IT FOR YOU TO DO YOUR WORK, TAKE CARE OF THINGS AT HOME, OR GET ALONG WITH OTHER PEOPLE?: EXTREMELY DIFFICULT
4. TROUBLE RELAXING: NEARLY EVERY DAY
1. FEELING NERVOUS, ANXIOUS, OR ON EDGE: NEARLY EVERY DAY
IF YOU CHECKED OFF ANY PROBLEMS ON THIS QUESTIONNAIRE, HOW DIFFICULT HAVE THESE PROBLEMS MADE IT FOR YOU TO DO YOUR WORK, TAKE CARE OF THINGS AT HOME, OR GET ALONG WITH OTHER PEOPLE: EXTREMELY DIFFICULT
GAD7 TOTAL SCORE: 16
5. BEING SO RESTLESS THAT IT IS HARD TO SIT STILL: NEARLY EVERY DAY
3. WORRYING TOO MUCH ABOUT DIFFERENT THINGS: NEARLY EVERY DAY
7. FEELING AFRAID AS IF SOMETHING AWFUL MIGHT HAPPEN: NOT AT ALL
7. FEELING AFRAID AS IF SOMETHING AWFUL MIGHT HAPPEN: NOT AT ALL

## 2023-01-10 ASSESSMENT — PATIENT HEALTH QUESTIONNAIRE - PHQ9
SUM OF ALL RESPONSES TO PHQ QUESTIONS 1-9: 13
SUM OF ALL RESPONSES TO PHQ QUESTIONS 1-9: 13
10. IF YOU CHECKED OFF ANY PROBLEMS, HOW DIFFICULT HAVE THESE PROBLEMS MADE IT FOR YOU TO DO YOUR WORK, TAKE CARE OF THINGS AT HOME, OR GET ALONG WITH OTHER PEOPLE: EXTREMELY DIFFICULT

## 2023-01-10 NOTE — PROGRESS NOTES
[facility]: Integrated Behavioral Health  January 10, 2023      Behavioral Health Clinician Progress Note    Patient Name: Sheri Joyce           Service Type:  Individual      Service Location:   MyChart / Email (patient reached)     Session Start Time: 10:01am  Session End Time: 10:56am      Session Length: 53 - 60      Attendees: Client     Service Modality:  Video Visit:      Provider verified identity through the following two step process.  Patient provided:  Patient     Telemedicine Visit: The patient's condition can be safely assessed and treated via synchronous audio and visual telemedicine encounter.      Reason for Telemedicine Visit: Patient has requested telehealth visit    Originating Site (Patient Location): Patient's home    Distant Site (Provider Location): [facility]    Consent:  The patient/guardian has verbally consented to: the potential risks and benefits of telemedicine (video visit) versus in person care; bill my insurance or make self-payment for services provided; and responsibility for payment of non-covered services.     Patient would like the video invitation sent by:  My Chart    Mode of Communication:  Video Conference via Amwell    Distant Location (Provider):  On-site    As the provider I attest to compliance with applicable laws and regulations related to telemedicine.    Visit Activities (Refresh list every visit): NEW    Diagnostic Assessment Date: next visit  Treatment Plan Review Date: 1/10/23  See Flowsheets for today's PHQ-9 and DELILAH-7 results  Previous PHQ-9:   PHQ-9 SCORE 2011 2013 1/10/2023   PHQ-9 Total Score 24 9 -   PHQ-9 Total Score MyChart - - 13 (Moderate depression)   PHQ-9 Total Score - - 13     Previous DELILAH-7:   DELILAH-7 SCORE 10/23/2015 2015 1/10/2023   Total Score - - 16 (severe anxiety)   Total Score 20 7 16       VINCENT LEVEL:  VINCENT Score (Last  Two) 7/28/2011   VINCENT Raw Score 49   Activation Score 82.8   VINCENT Level 4       DATA  Extended Session (60+ minutes): No  Interactive Complexity: No  Crisis: No  H Patient: No    Treatment Objective(s) Addressed in This Session:  Target Behavior(s): Decrease anxiety    Anxiety: will experience a reduction in anxiety and will develop more effective coping skills to manage anxiety symptoms    Current Stressors / Issues:  PT reports that she has Polycystic Kidney disease and is in the process of working on getting accepted to get on the transplant list. PT is also in the process of getting on dialysis. PT reports that starting in October her health has declined to the point that she has not been able to work. PT is struggling financially and is not bringing in any money and using her 401K with only 1 month left. PT reports that she is struggling to sleep as she will fall asleep but wake up in a panic due to financial stress and not be able to fall back asleep du to stress and then her restless leg will act up. PT and writer worked on skills she can use to get her back to sleep by reducing her anxiety. PT is going to try progressive muscle relaxation, getting up and using some coping skills and then going back to bed, and challenging thoughts.       Progress on Treatment Objective(s) / Homework:  New Objective established this session - ACTION (Actively working towards change); Intervened by reinforcing change plan / affirming steps taken    Motivational Interviewing    MI Intervention: Expressed Empathy/Understanding, Open-ended questions and Reframe     Change Talk Expressed by the Patient: Desire to change Ability to change Reasons to change Need to change Committment to change Activation Taking steps    Provider Response to Change Talk: E - Evoked more info from patient about behavior change and A - Affirmed patient's thoughts, decisions, or attempts at behavior change      Care Plan review completed:  Yes    Medication Review:  No current psychiatric medications prescribed    Medication Compliance:  Yes    Changes in Health Issues:   Yes: Sleep disturbance, Associated Psychological Distress  Chronic disease management, Associated Psychological Distress  restless leg syndrom with associated psychological distress    Chemical Use Review:   Substance Use: Chemical use reviewed, no active concerns identified      Tobacco Use: No current tobacco use.      Assessment: Current Emotional / Mental Status (status of significant symptoms):  Risk status (Self / Other harm or suicidal ideation)  Patient denies a history of suicidal ideation, suicide attempts, self-injurious behavior, homicidal ideation, homicidal behavior and and other safety concerns  Patient denies current fears or concerns for personal safety.  Patient denies current or recent suicidal ideation or behaviors.  Patient denies current or recent homicidal ideation or behaviors.  Patient denies current or recent self injurious behavior or ideation.  Patient denies other safety concerns.  A safety and risk management plan has not been developed at this time, however patient was encouraged to call [place] [phone] should there be a change in any of these risk factors.    Appearance:   Appropriate   Eye Contact:   Good   Psychomotor Behavior: Normal   Attitude:   Cooperative  Friendly Pleasant  Orientation:   All  Speech   Rate / Production: Normal    Volume:  Normal   Mood:    Normal  Affect:    Appropriate  Bright  Worrisome   Thought Content:  Clear   Thought Form:  Coherent  Logical   Insight:    Good     Diagnoses:  1. Generalized anxiety disorder    2. Chronic kidney disease, stage 5 (H)        Collateral Reports Completed:  Not Applicable    Plan: (Homework, other):  Patient was given information about behavioral services and encouraged to schedule a follow up appointment with the clinic [facility] in 2 weeks.  She was also given Cognitive Behavioral Therapy  skills to practice when experiencing anxiety and sleep Hygeine recommendations, including a handout with tips and strategies.  CD Recommendations: No indications of CD issues.  Cece Briceno MSW ,LICSW      ______________________________________________________________________    Integrated Primary Care Behavioral Health Treatment Plan    Patient's Name: Sheri Joyce  YOB: 1970    Date of Creation: 1/10/23  Date Treatment Plan Last Reviewed/Revised: 1/10/23    DSM5 Diagnoses: 300.02 (F41.1) Generalized Anxiety Disorder  Psychosocial / Contextual Factors: physical health, finances,  PROMIS (reviewed every 90 days):     Referral / Collaboration:  Referral to another professional/service is not indicated at this time..    Anticipated number of session for this episode of care: 10  Anticipation frequency of session: Every other week  Anticipated Duration of each session: 16-37 minutes  Treatment plan will be reviewed in 90 days or when goals have been changed.       MeasurableTreatment Goal(s) related to diagnosis / functional impairment(s)  Goal 1: Patient will Try coping skills to help with anxiety while sleeping     I will know I've met my goal when I can fall back asleep.      Objective #A (Patient Action)    Patient will use distraction each time intrusive worry surfaces.  Status: New - Date: 1/10/23     Intervention(s)  Therapist will teach distraction skills. progressive muscle relaxation.    Objective #B  Patient will use cognitive strategies identified in therapy to challenge anxious thoughts.  Status: New - Date: 1/10/23     Intervention(s)  Therapist will assign homework afdter eash session to try at bedtime  teach coping skills .    Objective #C  Patient will use cognitive strategies identified in therapy to challenge anxious thoughts.  Status: New - Date: 1/10/23     Intervention(s)  Therapist will provide educational materials on tapping.      Patient has reviewed and agreed to the above  plan.      Cece Briceno, [facility]  January 10, 2023  Answers for HPI/ROS submitted by the patient on 1/10/2023  If you checked off any problems, how difficult have these problems made it for you to do your work, take care of things at home, or get along with other people?: Extremely difficult  PHQ9 TOTAL SCORE: 13  DELILAH 7 TOTAL SCORE: 16

## 2023-01-10 NOTE — PATIENT INSTRUCTIONS
"Sleep Hygiene Skills-  twelve Simple Tips to Improve Your Sleep  Falling asleep may seem like an impossible dream when you re awake at 3 a.m., but good sleep is more under your control than you might think. Following healthy sleep habits can make the difference between restlessness and restful slumber. Researchers have identified a variety of practices and habits--known as  sleep hygiene\"--that can help anyone maximize the hours they spend sleeping, even those whose sleep is affected by insomnia, jet lag, or shift work.    Sleep hygiene may sound unimaginative, but it just may be the best way to get the sleep you need in this 24/7 age. Here are some simple tips for making the sleep of your dreams a nightly reality:    #1 Avoid Caffeine, Alcohol, Nicotine, and Other Chemicals that Interfere with Sleep  [email or web address]  Caffeinated products decrease a person s quality of sleep.    As any coffee lover knows, caffeine is a stimulant that can keep you awake. So avoid caffeine (found in coffee, tea, chocolate, cola, and some pain relievers) for four to six hours before bedtime. Similarly, smokers should refrain from using tobacco products too close to bedtime.    Dr. Ceballos  Caffeine and Sleep (0:43)  Dr. Chucky Ceballos describes how caffeine works to promote alertness, but can also inhibit restful sleep.    watch video      Although alcohol may help bring on sleep, after a few hours it acts as a stimulant, increasing the number of awakenings and generally decreasing the quality of sleep later in the night. It is therefore best to limit alcohol consumption to one to two drinks per day, or less, and to avoid drinking within three hours of bedtime.    #2 Turn Your Bedroom into a Sleep-Inducing Environment  A quiet, dark, and cool environment can help promote sound slumber. Why do you think bats congregate in caves for their daytime sleep? To achieve such an environment, lower the volume of outside noise with earplugs or " "a \"white noise\" appliance. Use heavy curtains, blackout shades, or an eye mask to block light, a powerful cue that tells the brain that it's time to wake up. Keep the temperature comfortably cool--between 60 and 75 F--and the room well ventilated. And make sure your bedroom is equipped with a comfortable mattress and pillows. (Remember that most mattresses wear out after ten years.)    Also, if a pet regularly wakes you during the night, you may want to consider keeping it out of your bedroom.    It may help to limit your bedroom activities to sleep and sex only. Keeping computers, TVs, and work materials out of the room will strengthen the mental association between your bedroom and sleep.    #3 Establish a Soothing Pre-Sleep Routine  [email or web address]  Light reading before bed is a good way to prepare yourself for sleep.    Ease the transition from wake time to sleep time with a period of relaxing activities an hour or so before bed. Take a bath (the rise, then fall in body temperature promotes drowsiness), read a book, watch television, or practice relaxation exercises. Avoid stressful, stimulating activities--doing work, discussing emotional issues. Physically and psychologically stressful activities can cause the body to secrete the stress hormone cortisol, which is associated with increasing alertness. If you tend to take your problems to bed, try writing them down--and then putting them aside.    #4 Go to Sleep When You re Truly Tired  Struggling to fall sleep just leads to frustration. If you re not asleep after 20 minutes, get out of bed, go to another room, and do something relaxing, like reading or listening to music until you are tired enough to sleep.    #5 Don t Be a Nighttime Clock-Watcher  Staring at a clock in your bedroom, either when you are trying to fall asleep or when you wake in the middle of the night, can actually increase stress, making it harder to fall asleep. Turn your clock s face away from " "you.    And if you wake up in the middle of the night and can t get back to sleep in about 20 minutes, get up and engage in a quiet, restful activity such as reading or listening to music. And keep the lights dim; bright light can stimulate your internal clock. When your eyelids are drooping and you are ready to sleep, return to bed.    #6 Use Light to Your Advantage  Natural light keeps your internal clock on a healthy sleep-wake cycle. So let in the light first thing in the morning and get out of the office for a sun break during the day.    #7 Keep Your Internal Clock Set with a Consistent Sleep Schedule  [email or web address]  Having a regular sleep schedule helps to ensure better quality and consistent sleep.    Going to bed and waking up at the same time each day sets the body s \"internal clock\" to expect sleep at a certain time night after night. Try to stick as closely as possible to your routine on weekends to avoid a Monday morning sleep hangover. Waking up at the same time each day is the very best way to set your clock, and even if you did not sleep well the night before, the extra sleep drive will help you consolidate sleep the following night. Learn more about the importance of synchronizing the clock in The Drive to Sleep and Our Internal Clock.    Dr. Gee  Maintaining a Consistent Wake Time (1:13)  Dr. Clayton Gee describes the importance of waking up at the same time each day.    watch video    #8 Nap Early--Or Not at All  Many people make naps a regular part of their day. However, for those who find falling asleep or staying asleep through the night problematic, afternoon napping may be one of the culprits. This is because late-day naps decrease sleep drive. If you must nap, it s better to keep it short and before 5 p.m.    Dr. Gee  To Nap or Not? (0:35)  Dr. Clayton Gee discusses the benefits of short naps.    watch video    #9 Lighten Up on Evening Meals  Eating a pepperoni pizza at 10 p.m. may be " a recipe for insomnia. Finish dinner several hours before bedtime and avoid foods that cause indigestion. If you get hungry at night, snack on foods that (in your experience) won't disturb your sleep, perhaps dairy foods and carbohydrates.    #10 Balance Fluid Intake  Drink enough fluid at night to keep from waking up thirsty--but not so much and so close to bedtime that you will be awakened by the need for a trip to the bathroom.    #11 Exercise Early  [email or web address]  Exercise helps promote restful sleep if it is done several hours before you go to bed.    Exercise can help you fall asleep faster and sleep more soundly--as long as it's done at the right time. Exercise stimulates the body to secrete the stress hormone cortisol, which helps activate the alerting mechanism in the brain. This is fine, unless you're trying to fall asleep. Try to finish exercising at least three hours before bed or work out earlier in the day.    #12 Follow Through  Some of these tips will be easier to include in your daily and nightly routine than others. However, if you stick with them, your chances of achieving restful sleep will improve. That said, not all sleep problems are so easily treated and could signify the presence of a sleep disorder such as apnea, restless legs syndrome, narcolepsy, or another clinical sleep problem. If your sleep difficulties don t improve through good sleep hygiene, you may want to consult your physician or a sleep specialist. Learn more at When to Seek Treatment.    Tapping- I tell people to try this when they are feeling better. Practice it before actually using it. The first two are for Restless leg and the other one is for Anxiety.  https://www.youtube.com/watch?v=7nqWyXu4oKP  https://www.youtube.com/watch?v=XXkOnOxuD0o  https://www.youtube.com/watch?v=67bY0EMg18C    Progressive muscle relaxation- COPY and paste to  browser  https://www.[email or web address] Applebaum MD/articles/ovs-ok-zo-progressive-muscle-relaxation/  https://www.youtube.com/watch?v=ORrQX34w_KM

## 2023-01-10 NOTE — TELEPHONE ENCOUNTER
Called pt to introduce self as [facility] coordinator, voicemailbox is full unable to leave message. Will send [facility] message with contact information.

## 2023-01-11 ENCOUNTER — HOSPITAL ENCOUNTER (OUTPATIENT)
Facility: CLINIC | Age: 53
Discharge: HOME | End: 2023-01-11
Attending: SURGERY | Admitting: SURGERY
Payer: COMMERCIAL

## 2023-01-11 ENCOUNTER — ANESTHESIA (OUTPATIENT)
Dept: SURGERY | Facility: CLINIC | Age: 53
End: 2023-01-11
Payer: COMMERCIAL

## 2023-01-11 VITALS
DIASTOLIC BLOOD PRESSURE: 90 MMHG | SYSTOLIC BLOOD PRESSURE: 150 MMHG | OXYGEN SATURATION: 100 % | BODY MASS INDEX: 23.56 KG/M2 | WEIGHT: 138.01 LBS | HEIGHT: 64 IN | TEMPERATURE: 98 F | RESPIRATION RATE: 16 BRPM | HEART RATE: 79 BPM

## 2023-01-11 DIAGNOSIS — N18.5 CHRONIC KIDNEY DISEASE, STAGE 5 (H): Primary | ICD-10-CM

## 2023-01-11 LAB
ABO/RH(D): NORMAL
ANTIBODY SCREEN: NEGATIVE
SPECIMEN EXPIRATION DATE: NORMAL

## 2023-01-11 PROCEDURE — 370N000017 HC ANESTHESIA TECHNICAL FEE, PER MIN: Performed by: SURGERY

## 2023-01-11 PROCEDURE — 250N000009 HC RX 250: Performed by: SURGERY

## 2023-01-11 PROCEDURE — 250N000011 HC RX IP 250 OP 636: Performed by: ANESTHESIOLOGY

## 2023-01-11 PROCEDURE — 278N000051 HC OR IMPLANT GENERAL: Performed by: SURGERY

## 2023-01-11 PROCEDURE — 258N000003 HC RX IP 258 OP 636: Performed by: STUDENT IN AN ORGANIZED HEALTH CARE EDUCATION/TRAINING PROGRAM

## 2023-01-11 PROCEDURE — 360N000077 HC SURGERY LEVEL 4, PER MIN: Performed by: SURGERY

## 2023-01-11 PROCEDURE — 86901 BLOOD TYPING SEROLOGIC RH(D): CPT | Performed by: SURGERY

## 2023-01-11 PROCEDURE — 49324 LAP INSERT TUNNEL IP CATH: CPT | Performed by: SURGERY

## 2023-01-11 PROCEDURE — 36415 COLL VENOUS BLD VENIPUNCTURE: CPT | Performed by: SURGERY

## 2023-01-11 PROCEDURE — 999N000141 HC STATISTIC PRE-PROCEDURE NURSING ASSESSMENT: Performed by: SURGERY

## 2023-01-11 PROCEDURE — 710N000012 HC RECOVERY PHASE 2, PER MINUTE: Performed by: SURGERY

## 2023-01-11 PROCEDURE — 250N000011 HC RX IP 250 OP 636: Performed by: SURGERY

## 2023-01-11 PROCEDURE — 272N000001 HC OR GENERAL SUPPLY STERILE: Performed by: SURGERY

## 2023-01-11 PROCEDURE — 250N000025 HC SEVOFLURANE, PER MIN: Performed by: SURGERY

## 2023-01-11 PROCEDURE — 710N000009 HC RECOVERY PHASE 1, LEVEL 1, PER MIN: Performed by: SURGERY

## 2023-01-11 PROCEDURE — 250N000011 HC RX IP 250 OP 636: Performed by: STUDENT IN AN ORGANIZED HEALTH CARE EDUCATION/TRAINING PROGRAM

## 2023-01-11 PROCEDURE — 250N000009 HC RX 250: Performed by: ANESTHESIOLOGY

## 2023-01-11 PROCEDURE — 250N000009 HC RX 250: Performed by: STUDENT IN AN ORGANIZED HEALTH CARE EDUCATION/TRAINING PROGRAM

## 2023-01-11 DEVICE — IMPLANTABLE DEVICE
Type: IMPLANTABLE DEVICE | Site: ABDOMEN | Status: FUNCTIONAL
Brand: FLEX-NECK®

## 2023-01-11 RX ORDER — CEFAZOLIN SODIUM/WATER 2 G/20 ML
2 SYRINGE (ML) INTRAVENOUS
Status: COMPLETED | OUTPATIENT
Start: 2023-01-11 | End: 2023-01-11

## 2023-01-11 RX ORDER — POLYETHYLENE GLYCOL 3350 17 G/17G
17 POWDER, FOR SOLUTION ORAL DAILY
Qty: 510 G | Refills: 1 | Status: SHIPPED | OUTPATIENT
Start: 2023-01-11

## 2023-01-11 RX ORDER — SENNOSIDES A AND B 8.6 MG/1
1 TABLET, FILM COATED ORAL DAILY
Qty: 30 TABLET | Refills: 1 | Status: ON HOLD | OUTPATIENT
Start: 2023-01-11 | End: 2024-03-21

## 2023-01-11 RX ORDER — PROPOFOL 10 MG/ML
INJECTION, EMULSION INTRAVENOUS PRN
Status: DISCONTINUED | OUTPATIENT
Start: 2023-01-11 | End: 2023-01-11

## 2023-01-11 RX ORDER — ESMOLOL HYDROCHLORIDE 10 MG/ML
INJECTION INTRAVENOUS PRN
Status: DISCONTINUED | OUTPATIENT
Start: 2023-01-11 | End: 2023-01-11

## 2023-01-11 RX ORDER — LIDOCAINE HYDROCHLORIDE 20 MG/ML
INJECTION, SOLUTION INFILTRATION; PERINEURAL PRN
Status: DISCONTINUED | OUTPATIENT
Start: 2023-01-11 | End: 2023-01-11

## 2023-01-11 RX ORDER — ONDANSETRON 2 MG/ML
4 INJECTION INTRAMUSCULAR; INTRAVENOUS EVERY 30 MIN PRN
Status: DISCONTINUED | OUTPATIENT
Start: 2023-01-11 | End: 2023-01-11 | Stop reason: HOSPADM

## 2023-01-11 RX ORDER — CEFAZOLIN SODIUM/WATER 2 G/20 ML
2 SYRINGE (ML) INTRAVENOUS SEE ADMIN INSTRUCTIONS
Status: DISCONTINUED | OUTPATIENT
Start: 2023-01-11 | End: 2023-01-11 | Stop reason: HOSPADM

## 2023-01-11 RX ORDER — DOCUSATE SODIUM 100 MG/1
100 CAPSULE, LIQUID FILLED ORAL 2 TIMES DAILY
Qty: 30 CAPSULE | Refills: 1 | Status: SHIPPED | OUTPATIENT
Start: 2023-01-11 | End: 2024-02-27

## 2023-01-11 RX ORDER — FENTANYL CITRATE 50 UG/ML
50 INJECTION, SOLUTION INTRAMUSCULAR; INTRAVENOUS EVERY 5 MIN PRN
Status: DISCONTINUED | OUTPATIENT
Start: 2023-01-11 | End: 2023-01-11 | Stop reason: HOSPADM

## 2023-01-11 RX ORDER — CEFAZOLIN SODIUM 1 G/3ML
1 INJECTION, POWDER, FOR SOLUTION INTRAMUSCULAR; INTRAVENOUS EVERY 8 HOURS
Status: DISCONTINUED | OUTPATIENT
Start: 2023-01-11 | End: 2023-01-11

## 2023-01-11 RX ORDER — FENTANYL CITRATE 50 UG/ML
25 INJECTION, SOLUTION INTRAMUSCULAR; INTRAVENOUS EVERY 5 MIN PRN
Status: DISCONTINUED | OUTPATIENT
Start: 2023-01-11 | End: 2023-01-11 | Stop reason: HOSPADM

## 2023-01-11 RX ORDER — SODIUM CHLORIDE, SODIUM LACTATE, POTASSIUM CHLORIDE, CALCIUM CHLORIDE 600; 310; 30; 20 MG/100ML; MG/100ML; MG/100ML; MG/100ML
INJECTION, SOLUTION INTRAVENOUS CONTINUOUS PRN
Status: DISCONTINUED | OUTPATIENT
Start: 2023-01-11 | End: 2023-01-11

## 2023-01-11 RX ORDER — ONDANSETRON 4 MG/1
4 TABLET, ORALLY DISINTEGRATING ORAL EVERY 30 MIN PRN
Status: DISCONTINUED | OUTPATIENT
Start: 2023-01-11 | End: 2023-01-11 | Stop reason: HOSPADM

## 2023-01-11 RX ORDER — DEXAMETHASONE SODIUM PHOSPHATE 4 MG/ML
INJECTION, SOLUTION INTRA-ARTICULAR; INTRALESIONAL; INTRAMUSCULAR; INTRAVENOUS; SOFT TISSUE PRN
Status: DISCONTINUED | OUTPATIENT
Start: 2023-01-11 | End: 2023-01-11

## 2023-01-11 RX ORDER — ONDANSETRON 2 MG/ML
INJECTION INTRAMUSCULAR; INTRAVENOUS PRN
Status: DISCONTINUED | OUTPATIENT
Start: 2023-01-11 | End: 2023-01-11

## 2023-01-11 RX ORDER — HYDROMORPHONE HYDROCHLORIDE 1 MG/ML
0.2 INJECTION, SOLUTION INTRAMUSCULAR; INTRAVENOUS; SUBCUTANEOUS EVERY 5 MIN PRN
Status: DISCONTINUED | OUTPATIENT
Start: 2023-01-11 | End: 2023-01-11 | Stop reason: HOSPADM

## 2023-01-11 RX ORDER — HYDROMORPHONE HYDROCHLORIDE 1 MG/ML
0.4 INJECTION, SOLUTION INTRAMUSCULAR; INTRAVENOUS; SUBCUTANEOUS EVERY 5 MIN PRN
Status: DISCONTINUED | OUTPATIENT
Start: 2023-01-11 | End: 2023-01-11 | Stop reason: HOSPADM

## 2023-01-11 RX ORDER — SODIUM CHLORIDE, SODIUM LACTATE, POTASSIUM CHLORIDE, CALCIUM CHLORIDE 600; 310; 30; 20 MG/100ML; MG/100ML; MG/100ML; MG/100ML
INJECTION, SOLUTION INTRAVENOUS CONTINUOUS
Status: DISCONTINUED | OUTPATIENT
Start: 2023-01-11 | End: 2023-01-11 | Stop reason: HOSPADM

## 2023-01-11 RX ORDER — OXYCODONE HYDROCHLORIDE 5 MG/1
5 TABLET ORAL EVERY 6 HOURS PRN
Qty: 12 TABLET | Refills: 0 | Status: SHIPPED | OUTPATIENT
Start: 2023-01-11 | End: 2023-01-14

## 2023-01-11 RX ORDER — HYDRALAZINE HYDROCHLORIDE 20 MG/ML
INJECTION INTRAMUSCULAR; INTRAVENOUS PRN
Status: DISCONTINUED | OUTPATIENT
Start: 2023-01-11 | End: 2023-01-11

## 2023-01-11 RX ORDER — FENTANYL CITRATE 50 UG/ML
INJECTION, SOLUTION INTRAMUSCULAR; INTRAVENOUS PRN
Status: DISCONTINUED | OUTPATIENT
Start: 2023-01-11 | End: 2023-01-11

## 2023-01-11 RX ADMIN — FENTANYL CITRATE 100 MCG: 50 INJECTION, SOLUTION INTRAMUSCULAR; INTRAVENOUS at 07:31

## 2023-01-11 RX ADMIN — MIDAZOLAM 2 MG: 1 INJECTION INTRAMUSCULAR; INTRAVENOUS at 07:20

## 2023-01-11 RX ADMIN — SODIUM CHLORIDE, POTASSIUM CHLORIDE, SODIUM LACTATE AND CALCIUM CHLORIDE: 600; 310; 30; 20 INJECTION, SOLUTION INTRAVENOUS at 07:24

## 2023-01-11 RX ADMIN — ONDANSETRON 4 MG: 2 INJECTION INTRAMUSCULAR; INTRAVENOUS at 09:10

## 2023-01-11 RX ADMIN — ESMOLOL HYDROCHLORIDE 30 MG: 10 INJECTION, SOLUTION INTRAVENOUS at 08:31

## 2023-01-11 RX ADMIN — ONDANSETRON HYDROCHLORIDE 4 MG: 2 INJECTION, SOLUTION INTRAMUSCULAR; INTRAVENOUS at 09:51

## 2023-01-11 RX ADMIN — HYDROMORPHONE HYDROCHLORIDE 0.5 MG: 1 INJECTION, SOLUTION INTRAMUSCULAR; INTRAVENOUS; SUBCUTANEOUS at 08:22

## 2023-01-11 RX ADMIN — PROPOFOL 50 MG: 10 INJECTION, EMULSION INTRAVENOUS at 08:33

## 2023-01-11 RX ADMIN — HYDRALAZINE HYDROCHLORIDE 2 MG: 20 INJECTION INTRAMUSCULAR; INTRAVENOUS at 09:04

## 2023-01-11 RX ADMIN — Medication 50 MG: at 07:32

## 2023-01-11 RX ADMIN — Medication 20 MG: at 08:08

## 2023-01-11 RX ADMIN — LIDOCAINE HYDROCHLORIDE 60 MG: 20 INJECTION, SOLUTION INFILTRATION; PERINEURAL at 07:31

## 2023-01-11 RX ADMIN — FENTANYL CITRATE 25 MCG: 50 INJECTION, SOLUTION INTRAMUSCULAR; INTRAVENOUS at 09:49

## 2023-01-11 RX ADMIN — PROPOFOL 30 MG: 10 INJECTION, EMULSION INTRAVENOUS at 08:20

## 2023-01-11 RX ADMIN — SUGAMMADEX 200 MG: 100 INJECTION, SOLUTION INTRAVENOUS at 09:25

## 2023-01-11 RX ADMIN — LIDOCAINE HYDROCHLORIDE 40 MG: 20 INJECTION, SOLUTION INFILTRATION; PERINEURAL at 08:21

## 2023-01-11 RX ADMIN — FENTANYL CITRATE 25 MCG: 50 INJECTION, SOLUTION INTRAMUSCULAR; INTRAVENOUS at 10:18

## 2023-01-11 RX ADMIN — Medication 2 G: at 07:45

## 2023-01-11 RX ADMIN — DEXAMETHASONE SODIUM PHOSPHATE 4 MG: 4 INJECTION, SOLUTION INTRA-ARTICULAR; INTRALESIONAL; INTRAMUSCULAR; INTRAVENOUS; SOFT TISSUE at 07:52

## 2023-01-11 RX ADMIN — HYDRALAZINE HYDROCHLORIDE 2 MG: 20 INJECTION INTRAMUSCULAR; INTRAVENOUS at 09:15

## 2023-01-11 RX ADMIN — PROPOFOL 170 MG: 10 INJECTION, EMULSION INTRAVENOUS at 07:31

## 2023-01-11 ASSESSMENT — ACTIVITIES OF DAILY LIVING (ADL)
ADLS_ACUITY_SCORE: 35

## 2023-01-11 NOTE — ANESTHESIA POSTPROCEDURE EVALUATION
Patient: Sheri Joyce    Procedure: Procedure(s):  INSERTION, CATHETER, DIALYSIS, PERITONEAL, LAPAROSCOPIC       Anesthesia Type:  General    Note:  Disposition: Admission   Postop Pain Control: Uneventful            Sign Out: Well controlled pain   PONV: No   Neuro/Psych: Uneventful            Sign Out: Acceptable/Baseline neuro status   Airway/Respiratory: Uneventful            Sign Out: Acceptable/Baseline resp. status   CV/Hemodynamics: Uneventful            Sign Out: Acceptable CV status; No obvious hypovolemia; No obvious fluid overload   Other NRE: NONE   DID A NON-ROUTINE EVENT OCCUR? No           Last vitals:  Vitals Value Taken Time   /96 01/11/23 1115   Temp 36.4  C (97.5  F) 01/11/23 1025   Pulse 80 01/11/23 1115   Resp 30 01/11/23 1115   SpO2 98 % 01/11/23 1115   Vitals shown include unvalidated device data.    Electronically Signed By: Felix Osborne MD  January 11, 2023  12:55 PM

## 2023-01-11 NOTE — ANESTHESIA POSTPROCEDURE EVALUATION
Patient: Sheri Joyce    Procedure: Procedure(s):  INSERTION, CATHETER, DIALYSIS, PERITONEAL, LAPAROSCOPIC       Anesthesia Type:  General    Note:  Disposition: Outpatient   Postop Pain Control: Uneventful            Sign Out: Well controlled pain   PONV: No   Neuro/Psych: Uneventful            Sign Out: Acceptable/Baseline neuro status   Airway/Respiratory: Uneventful            Sign Out: Acceptable/Baseline resp. status   CV/Hemodynamics: Uneventful            Sign Out: Acceptable CV status; No obvious hypovolemia; No obvious fluid overload   Other NRE: NONE   DID A NON-ROUTINE EVENT OCCUR? No           Last vitals:  Vitals Value Taken Time   /96 01/11/23 1029   Temp 36.4  C (97.5  F) 01/11/23 1025   Pulse 79 01/11/23 1037   Resp 15 01/11/23 1037   SpO2 97 % 01/11/23 1037   Vitals shown include unvalidated device data.    Electronically Signed By: Robert Snow MD  January 11, 2023  10:38 AM

## 2023-01-11 NOTE — BRIEF OP NOTE
[facility]    Brief Operative Note    Pre-operative diagnosis: ESRD (end stage renal disease) (H) [N18.6]  Post-operative diagnosis Same as pre-operative diagnosis    Procedure: Procedure(s):  INSERTION, CATHETER, DIALYSIS, PERITONEAL, LAPAROSCOPIC  Surgeon: Surgeon(s) and Role:     * Juanita Hagen MD - Primary     * Bryce Lawrence MD - Fellow - Assisting  Anesthesia: General   Estimated Blood Loss: 2 mL from 1/11/2023  7:22 AM to 1/11/2023  9:36 AM      Drains: None  Specimens: * No specimens in log *  Findings:   None.  Complications: None.  Implants:   Implant Name Type Inv. Item Serial No.  Lot No. LRB No. Used Action   Flex-Neck ExxTended Adult Peritoneal  Dialysis Catheter  Catheter   [employer] [id number] Right 1 Implanted

## 2023-01-11 NOTE — ANESTHESIA PROCEDURE NOTES
Airway       Patient location during procedure: OR       Procedure Start/Stop Times: 1/11/2023 7:34 AM  Staff -        CRNA: Pete Estevez APRN CRNA       Performed By: CRNA  Consent for Airway        Urgency: elective  Indications and Patient Condition       Indications for airway management: samia-procedural       Induction type:intravenous       Mask difficulty assessment: 1 - vent by mask    Final Airway Details       Final airway type: endotracheal airway       Successful airway: ETT - single  Endotracheal Airway Details        ETT size (mm): 7.0       Cuffed: yes       Successful intubation technique: direct laryngoscopy       DL Blade Type: MAC 3       Grade View of Cords: 1       Adjucts: stylet       Position: Right       Measured from: gums/teeth       Secured at (cm): 22       Bite block used: None    Post intubation assessment        Placement verified by: capnometry, equal breath sounds and chest rise        Number of attempts at approach: 1       Number of other approaches attempted: 0       Secured with: pink tape       Ease of procedure: easy       Dentition: Intact and Unchanged    Medication(s) Administered   Medication Administration Time: 1/11/2023 7:34 AM

## 2023-01-11 NOTE — ANESTHESIA CARE TRANSFER NOTE
Patient: Sheri Joyce    Procedure: Procedure(s):  INSERTION, CATHETER, DIALYSIS, PERITONEAL, LAPAROSCOPIC       Diagnosis: ESRD (end stage renal disease) (H) [N18.6]  Diagnosis Additional Information: No value filed.    Anesthesia Type:   General     Note:    Oropharynx: oropharynx clear of all foreign objects  Level of Consciousness: awake and drowsy  Oxygen Supplementation: room air    Independent Airway: airway patency satisfactory and stable  Dentition: dentition unchanged  Vital Signs Stable: post-procedure vital signs reviewed and stable  Report to RN Given: handoff report given  Patient transferred to: PACU    Handoff Report: Identifed the Patient, Identified the Reponsible Provider, Reviewed the pertinent medical history, Discussed the surgical course, Reviewed Intra-OP anesthesia mangement and issues during anesthesia, Set expectations for post-procedure period and Allowed opportunity for questions and acknowledgement of understanding      Vitals:  Vitals Value Taken Time   /95 01/11/23 0939   Temp     Pulse 75 01/11/23 0942   Resp 15 01/11/23 0942   SpO2 97 % 01/11/23 0942   Vitals shown include unvalidated device data.    Electronically Signed By: RYDER Villegas CRNA  January 11, 2023  9:43 AM

## 2023-01-11 NOTE — OP NOTE
Transplant Surgery  Operative Note  Date: 1/11/2023  Preop Dx:  Chronic Kidney Disease  Postop Dx: Same  Procedure: Laparoscopic Peritoneal dialysis catheter placement. Omentopexy    Surgeon: Juanita Hagen MD  Fellow: Bryce Lawrence MD  Anesthesia: General.  EBL: minimal  Drains: Peritoneal dialysis catheter placement  Specimen: None.  Complications: None.    Findings: Normal. Catheter in good position with good flow.     Indication: The patient has Chronic Kidney disease and is in need of permanent dialysis access.  After discussing the risks and benefits of proceeding, the patient agreed to proceed with surgery and provided informed consent.      Procedure: The patient was brought to the operating room and placed supine on the operating table. After induction of general anesthesia, the abdomen was prepped and draped in the usual fashion. A time-out was performed to ensure the correct patient and procedure. We used the stencil from the kit to monica the catheter course and exit site. Perioperative antibiotics were given. The peritoneal cavity was accessed with a Veress needle inserted at supraumbilical point and CO2 insufflation to 15 mm H20 was performed. Two 5mm ports were then placed in the left abdomen.  The laparoscopic survey did not reveal any abnormalities.  An 8mm incision was made in the right paramedian space based on the stencil's map. Over the Veress needle the STEP expander sheath was loaded and under direct visualization, the Veress needle was walked caudad along the left posterior rectus sheath. Then we entered the peritoneal cavity approximately 4 cm caudad to the skin incision.  We removed the Veress needle and dilated the sheath with the port, loaded the catheter on a stylet and inserted it through the port down into the true pelvis, taking care to maintain correct orientation of the catheter.  At this point, we removed the stylet. Care was taken to make sure that the distal cuff  remained outside the peritoneal space.  The abdomen was desufflated. A catheter extension was connected and counter incision was made. We used the Woody tunneling device to externalize the catheter out the previously marked site. We connected the end of the catheter to a liter of heparinized saline and allowed it to run in. It ran in easily. We placed the bag on the floor and by gravity drainage the fluid drained easily for a near complete evacuation of non-bloody saline.        The port site fascia was closed with O Vicryl stitches using a PMI needle.   All the ports were removed.  The skin incisions were closed with Monocryl and Dermabond and the sterile extension set and minicap was placed on the end of the catheter.  A sterile occlusive dressing was applied over the catheter exit site.  All needle and sponge counts were correct x 2.  The patient was awakened from anesthesia and transported to the recovery room, having tolerated the procedure well.  There were no apparent complications.   Faculty was present for the critical portions of the procedure.    Physician Attestation   I was present for the key portions of the procedure and I was immediately available for the entire procedure between opening and closing.    Juanita Hagen MD, MD  Date of Service (when I saw the patient): 1/11/2023  The transplant fellow, Bryce Lawrence MD, was present and assisted with all aspects of the operation described above from opening to closing.  There was no suitable surgical resident available to assist in this procedure.

## 2023-01-11 NOTE — DISCHARGE INSTRUCTIONS
[facility]   Adult Discharge Orders & Instructions     For 24 hours after surgery    Get plenty of rest.  A responsible adult must stay with you for at least 24 hours after you leave the hospital.   Do not drive or use heavy equipment.  If you have weakness or tingling, don't drive or use heavy equipment until this feeling goes away.  Do not drink alcohol.  Avoid strenuous or risky activities.  Ask for help when climbing stairs.   You may feel lightheaded.  IF so, sit for a few minutes before standing.  Have someone help you get up.   If you have nausea (feel sick to your stomach): Drink only clear liquids such as apple juice, ginger ale, broth or 7-Up.  Rest may also help.  Be sure to drink enough fluids.  Move to a regular diet as you feel able.  You may have a slight fever. Call the doctor if your fever is over 100 F (37.7 C) (taken under the tongue) or lasts longer than 24 hours.  You may have a dry mouth, a sore throat, muscle aches or trouble sleeping.  These should go away after 24 hours.  Do not make important or legal decisions.   Call your doctor for any of the followin.  Signs of infection (fever, growing tenderness at the surgery site, a large amount of drainage or bleeding, severe pain, foul-smelling drainage, redness, swelling).    2. It has been over 8 to 10 hours since surgery and you are still not able to urinate (pass water).    3.  Headache for over 24 hours.    4.  Numbness, tingling or weakness the day after surgery (if you had spinal anesthesia).  To contact a doctor, call Juanita Hagen -[phone], or          [phone] for resident on call                Caring for Your PD Catheter and Exit Site  Your healthcare provider will teach you how to care for your catheter and exit site. Good care is important to prevent infection. If an infection occurs, the catheter may have to be taken out. A new one will have to be put in at a later date.   During healing  Your  skin will heal in a week or two. During this time, follow your healthcare provider's instructions. Don't get your catheter or exit site wet until your healthcare provider says you can.   After healing  Always scrub your hands for 20 seconds before and after touching your catheter.  Keep your catheter clean and covered.  [place] or secure the catheter as instructed. Don't let clothes or things you carry rub or pull at it.  Always do exchanges in a clean place.  Never use a cloudy or leaking bag of dialysate.  Wear a mask while doing exchanges.  Don't swim in lakes or streams, rivers, or public pools. Also don't soak in baths, hot tubs, or jacuzzis. It's OK to go into the ocean or a chlorinated private pool.  Make sure all caretakers and healthcare providers clean their hands before and after caring for you or your catheter access and exit site. They should wash their hands with soap and water. Or they can use an alcohol-based hand .    Watching for problems  Call your healthcare provider if you have any of these problems:   Bleeding or draining from the exit site  Red, painful, or warm skin around the catheter  Fever of 100.4 F ( 38 C) or higher, or as directed by your healthcare provider  Pain in your belly (abdomen)  Cloudy or bloody dialysate when it drains from your belly  Incomplete draining of dialysate  Important numbers  Write the names and numbers of your healthcare providers below. You need to know how to get in touch with them. Make sure you know how to reach them even after hours and on weekends and holidays.   Doctor:  Name _____Michelle Cruz______________ Phone _____[phone]______________     Tianna Vieyra  [phone]     Surgeon:  Name __Juanita Hagen _________ Phone ________ [phone]___________   Dialysis Center:  Name ___________________ Phone ___________________   Nelly last reviewed this educational content on 4/1/2020 2000-2021 [employer].  All rights reserved. This information is not intended as a substitute for professional medical care. Always follow your healthcare professional's instructions.

## 2023-01-11 NOTE — ANESTHESIA PREPROCEDURE EVALUATION
Anesthesia Pre-Procedure Evaluation    Patient: Sheri Joyce   MRN: [id number] : 1970        Procedure : Procedure(s):  INSERTION, CATHETER, DIALYSIS, PERITONEAL, LAPAROSCOPIC          Past Medical History:   Diagnosis Date     Anxiety      Chronic kidney disease      Contraception 2009     Depression      Former tobacco use      Hypertension      Liver disease      PKD (polycystic kidney disease)      Polycystic kidney, unspecified type      PONV (postoperative nausea and vomiting)      Thyroid disease      Varicosities       Past Surgical History:   Procedure Laterality Date     LAPAROSCOPIC DECORTICATION CYST RENAL  2006     MYRINGOTOMY, INSERT TUBE(S), ADENOIDECTOMY, COMBINED       PE TUBES  age 13     SURGICAL HISTORY OF -   2005    kidney surgery for cyst removal      Allergies   Allergen Reactions     Bactrim [Sulfamethoxazole W/Trimethoprim] Itching     Seasonal Allergies      Pcn [Penicillin G]       Social History     Tobacco Use     Smoking status: Former     Packs/day: 0.50     Years: 10.00     Pack years: 5.00     Types: Cigarettes     Quit date: 10/17/2022     Years since quittin.2     Smokeless tobacco: Never   Substance Use Topics     Alcohol use: Yes     Comment: Very occasionally      Wt Readings from Last 1 Encounters:   23 62.6 kg (138 lb 0.1 oz)        Anesthesia Evaluation   Pt has had prior anesthetic. Type: General.    History of anesthetic complications  - PONV.      ROS/MED HX  ENT/Pulmonary:  - neg pulmonary ROS     Neurologic:  - neg neurologic ROS     Cardiovascular:     (+) hypertension-----Previous cardiac testing   Echo: Date: 2022 Results:  Procedure  Echocardiogram with two-dimensional, color and spectral Doppler performed.  ______________________________________________________________________________  Interpretation Summary  Global and regional left ventricular function is normal with an EF of 55-60%.  Global right ventricular function  is normal.  No significant valvular abnormalities present.  IVC diameter <2.1 cm collapsing >50% with sniff suggests a normal RA pressure  of 3 mmHg.  No pericardial effusion is present.  There is no prior study for direct comparison.  Stress Test: Date: Results:    ECG Reviewed: Date: Results:    Cath: Date: Results:      METS/Exercise Tolerance:     Hematologic:  - neg hematologic  ROS     Musculoskeletal:   (+) arthritis,     GI/Hepatic:     (+) liver disease,     Renal/Genitourinary:     (+) renal disease, type: CRI and ESRD,     Endo:     (+) thyroid problem, hypothyroidism,     Psychiatric/Substance Use:  - neg psychiatric ROS     Infectious Disease:  - neg infectious disease ROS     Malignancy:       Other:            Physical Exam    Airway  airway exam normal      Mallampati: II   TM distance: > 3 FB   Neck ROM: full   Mouth opening: > 3 cm    Respiratory Devices and Support         Dental  no notable dental history         Cardiovascular   cardiovascular exam normal          Pulmonary   pulmonary exam normal                OUTSIDE LABS:  CBC:   Lab Results   Component Value Date    WBC 6.4 12/08/2022    WBC 7.8 10/12/2022    HGB 10.1 (L) 12/27/2022    HGB 10.9 (L) 12/08/2022    HCT 32.5 (L) 12/08/2022    HCT 36.8 10/12/2022     12/08/2022     10/12/2022     BMP:   Lab Results   Component Value Date     12/27/2022     12/08/2022    POTASSIUM 3.9 12/27/2022    POTASSIUM 3.4 12/08/2022    CHLORIDE 103 12/27/2022    CHLORIDE 104 12/08/2022    CO2 24 12/27/2022    CO2 24 12/08/2022    BUN 42.3 (H) 12/27/2022    BUN 52 (H) 12/08/2022    CR 3.86 (H) 12/27/2022    CR 3.58 (H) 12/08/2022     (H) 12/27/2022    GLC 99 12/08/2022     COAGS:   Lab Results   Component Value Date    PTT 35 12/08/2022    INR 0.97 12/08/2022     POC:   Lab Results   Component Value Date    HCG Negative 03/02/2005     HEPATIC:   Lab Results   Component Value Date    ALBUMIN 4.2 12/27/2022    PROTTOTAL 8.3  12/08/2022    ALT 15 12/08/2022    AST 10 12/08/2022    ALKPHOS 62 12/08/2022    BILITOTAL 0.3 12/08/2022     OTHER:   Lab Results   Component Value Date    A1C 5.1 07/25/2008    ADRIEN 9.0 12/27/2022    PHOS 5.4 (H) 12/27/2022    TSH 2.75 12/27/2022    T4 0.90 12/27/2022       Anesthesia Plan    ASA Status:  3      Anesthesia Type: General.     - Airway: ETT   Induction: Intravenous.   Maintenance: Balanced.        Consents    Anesthesia Plan(s) and associated risks, benefits, and realistic alternatives discussed. Questions answered and patient/representative(s) expressed understanding.    - Discussed:     - Discussed with:  Patient      - Extended Intubation/Ventilatory Support Discussed: No.      - Patient is DNR/DNI Status: No    Use of blood products discussed: No .     Postoperative Care    Pain management: IV analgesics, Oral pain medications, Multi-modal analgesia.   PONV prophylaxis: Ondansetron (or other 5HT-3), Dexamethasone or Solumedrol     Comments:                Felix Osborne MD

## 2023-01-12 ASSESSMENT — ACTIVITIES OF DAILY LIVING (ADL)
ADLS_ACUITY_SCORE: 35

## 2023-01-13 ASSESSMENT — ACTIVITIES OF DAILY LIVING (ADL)
ADLS_ACUITY_SCORE: 35

## 2023-01-14 ASSESSMENT — ACTIVITIES OF DAILY LIVING (ADL)
ADLS_ACUITY_SCORE: 35

## 2023-01-15 ASSESSMENT — ACTIVITIES OF DAILY LIVING (ADL)
ADLS_ACUITY_SCORE: 35

## 2023-01-19 ENCOUNTER — TELEPHONE (OUTPATIENT)
Dept: GASTROENTEROLOGY | Facility: CLINIC | Age: 53
End: 2023-01-19
Payer: COMMERCIAL

## 2023-01-19 NOTE — TELEPHONE ENCOUNTER
Caller: Chantal  Reason for Reschedule/Cancellation (please be detailed, any staff messages or encounters to note?): Patient      Prior to reschedule please review:    Ordering Provider:Jarrell    Sedation per order:CS    Does patient have any ASC Exclusions, please identify?: N      Notes on Cancelled Procedure:    Procedure:Lower Endoscopy [Colonoscopy]     Date: 2/7/23    Location:[facility]; [address], [place] [address]    Surgeon: Lux        Rescheduled: Yes    Procedure: Lower Endoscopy [Colonoscopy]     Date: 3/21/23    Location:[facility]; [address], [place] [address]    Surgeon: Lux    Sedation Level Scheduled  CS,  Reason for Sedation Level Protocol    Prep/Instructions updated and sent: Yes

## 2023-01-23 DIAGNOSIS — I10 HYPERTENSION GOAL BP (BLOOD PRESSURE) < 130/80: Primary | ICD-10-CM

## 2023-01-23 RX ORDER — CARVEDILOL 6.25 MG/1
6.25 TABLET ORAL 2 TIMES DAILY WITH MEALS
Qty: 180 TABLET | Refills: 3 | Status: SHIPPED | OUTPATIENT
Start: 2023-01-23 | End: 2024-02-27

## 2023-01-31 ENCOUNTER — TELEPHONE (OUTPATIENT)
Dept: TRANSPLANT | Facility: CLINIC | Age: 53
End: 2023-01-31
Payer: COMMERCIAL

## 2023-01-31 NOTE — TELEPHONE ENCOUNTER
SW sent [facility] application for Sheri to fill out for any potential living donor financial assistance.     Elzbieta Loo [facility] CenterAYLIN, [facility] HospitalSW   Living Donor   [facility], [facility], [facility]  Direct: [phone]  E-Mail: [email or web address]

## 2023-01-31 NOTE — TELEPHONE ENCOUNTER
Called patient to schedule Iliac US, she confirmed for Feb 10 at 630am at the [facility], she was informed no food or drink 8 hrs before

## 2023-02-01 ENCOUNTER — MYC MEDICAL ADVICE (OUTPATIENT)
Dept: FAMILY MEDICINE | Facility: CLINIC | Age: 53
End: 2023-02-01
Payer: COMMERCIAL

## 2023-02-06 ENCOUNTER — PATIENT OUTREACH (OUTPATIENT)
Dept: NEPHROLOGY | Facility: CLINIC | Age: 53
End: 2023-02-06
Payer: COMMERCIAL

## 2023-02-06 DIAGNOSIS — G25.81 RESTLESS LEG SYNDROME: ICD-10-CM

## 2023-02-06 RX ORDER — GABAPENTIN 100 MG/1
100 CAPSULE ORAL AT BEDTIME
Qty: 30 CAPSULE | Refills: 0 | Status: SHIPPED | OUTPATIENT
Start: 2023-02-06 | End: 2024-02-27

## 2023-02-06 NOTE — TELEPHONE ENCOUNTER
Medication: gabapentin (NEURONTIN) 100 MG capsule  Sig: Take 1 capsule (100 mg) by mouth At Bedtime - Oral  Date last Filled: 1/06/23      Requested [facility]acy: [facility] [phone]    Pt's last office visit: 12/28/22  Next scheduled office visit: none

## 2023-02-06 NOTE — PROGRESS NOTES
"Neph Tracking Flowsheet Last Filled Values     CKD Education Status Complete    CKD Education Completed Date 12/05/22    CKD Education Type Dialysis Unit Tour    CKD Education Other Patient has been in touch with Alicia, PD nurse at [facility]. Patient feels \"totally informed.\" about PD choice    Preferred Modality Peritoneal Dialysis    Final Modality --  [facility] Admission Started- online 1/3/23    Patient's Referral Dates Auto Populate Patient's Referral Dates    Dietician Referral 11/15/22    Journey Referral 10/12/22    Access Surgeon Referral Status  Referred    Dialysis Access Referral 10/12/22  Msg sent to Dialysis access team    Access Surgical Consult 11/08/22  She is a good candidate and is well prepared for starting dialysis. Will contact dialysis access RN when needing access    Diaylsis Access Type PD  Sent to team to schedule PD cath placement    Dialysis Access Surgery 01/11/23  PD catheter placement    Dialysis Access Surgeon Xiao    Transplant Evaluation Referral 10/12/22    Transplant Status  Referred  11/15/22    Initiation of Dialysis Outpatient  Accepted at [facility] for PD- Nephrologist Dr. Boswell. Cath flush and dressing change scheduled 1/20/23. Will start PD training with Alicia RN at Unit on 1/24/23        Sabrina Mari, RN, BSN  Nephrology RN Care Coordinator  [facility]       "

## 2023-02-10 ENCOUNTER — ANCILLARY PROCEDURE (OUTPATIENT)
Dept: ULTRASOUND IMAGING | Facility: CLINIC | Age: 53
End: 2023-02-10
Attending: NURSE PRACTITIONER
Payer: COMMERCIAL

## 2023-02-10 DIAGNOSIS — N18.9 CHRONIC KIDNEY DISEASE (CKD): ICD-10-CM

## 2023-02-10 DIAGNOSIS — Z76.82 ORGAN TRANSPLANT CANDIDATE: ICD-10-CM

## 2023-02-10 PROCEDURE — 93978 VASCULAR STUDY: CPT | Mod: GC | Performed by: RADIOLOGY

## 2023-02-19 DIAGNOSIS — F43.21 SITUATIONAL DEPRESSION: ICD-10-CM

## 2023-02-19 DIAGNOSIS — F51.04 PSYCHOPHYSIOLOGICAL INSOMNIA: ICD-10-CM

## 2023-02-20 RX ORDER — TRAZODONE HYDROCHLORIDE 50 MG/1
TABLET, FILM COATED ORAL
Qty: 90 TABLET | Refills: 2 | Status: SHIPPED | OUTPATIENT
Start: 2023-02-20 | End: 2024-02-27

## 2023-03-02 ENCOUNTER — DOCUMENTATION ONLY (OUTPATIENT)
Dept: TRANSPLANT | Facility: CLINIC | Age: 53
End: 2023-03-02

## 2023-03-06 ENCOUNTER — OFFICE VISIT (OUTPATIENT)
Dept: NEUROPSYCHOLOGY | Facility: CLINIC | Age: 53
End: 2023-03-06
Payer: COMMERCIAL

## 2023-03-06 DIAGNOSIS — R41.3 COMPLAINTS OF MEMORY DISTURBANCE: Primary | ICD-10-CM

## 2023-03-06 DIAGNOSIS — Z01.818 PRE-TRANSPLANT EVALUATION FOR KIDNEY TRANSPLANT: ICD-10-CM

## 2023-03-06 PROCEDURE — 96133 NRPSYC TST EVAL PHYS/QHP EA: CPT

## 2023-03-06 PROCEDURE — 96132 NRPSYC TST EVAL PHYS/QHP 1ST: CPT

## 2023-03-06 PROCEDURE — 90791 PSYCH DIAGNOSTIC EVALUATION: CPT

## 2023-03-06 PROCEDURE — 96138 PSYCL/NRPSYC TECH 1ST: CPT

## 2023-03-06 PROCEDURE — 96139 PSYCL/NRPSYC TST TECH EA: CPT

## 2023-03-06 NOTE — NURSING NOTE
The patient was seen for neuropsychological evaluation at the request of RYDER Jolly, for the purposes of diagnostic clarification and treatment planning. 139 minutes of test administration and scoring were provided by this writer, Nehemiah Brennan. Please see Dr. Helen Wilkerson's report for a full interpretation of the findings.
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
## 2023-03-07 ENCOUNTER — TELEPHONE (OUTPATIENT)
Dept: GASTROENTEROLOGY | Facility: CLINIC | Age: 53
End: 2023-03-07

## 2023-03-09 ENCOUNTER — TELEPHONE (OUTPATIENT)
Dept: GASTROENTEROLOGY | Facility: CLINIC | Age: 53
End: 2023-03-09

## 2023-03-09 ENCOUNTER — OFFICE VISIT (OUTPATIENT)
Dept: CARDIOLOGY | Facility: CLINIC | Age: 53
End: 2023-03-09
Payer: COMMERCIAL

## 2023-03-09 VITALS
BODY MASS INDEX: 24.98 KG/M2 | OXYGEN SATURATION: 98 % | SYSTOLIC BLOOD PRESSURE: 122 MMHG | DIASTOLIC BLOOD PRESSURE: 79 MMHG | HEART RATE: 52 BPM | WEIGHT: 145.5 LBS

## 2023-03-09 DIAGNOSIS — Z01.818 PRE-TRANSPLANT EVALUATION FOR KIDNEY TRANSPLANT: Primary | ICD-10-CM

## 2023-03-09 DIAGNOSIS — Z12.11 SPECIAL SCREENING FOR MALIGNANT NEOPLASMS, COLON: Primary | ICD-10-CM

## 2023-03-09 DIAGNOSIS — Z99.2 ESRD (END STAGE RENAL DISEASE) ON DIALYSIS (H): ICD-10-CM

## 2023-03-09 DIAGNOSIS — N18.5 CHRONIC KIDNEY DISEASE, STAGE 5 (H): ICD-10-CM

## 2023-03-09 DIAGNOSIS — Q61.3 POLYCYSTIC KIDNEY DISEASE: ICD-10-CM

## 2023-03-09 DIAGNOSIS — N18.6 ESRD (END STAGE RENAL DISEASE) ON DIALYSIS (H): ICD-10-CM

## 2023-03-09 DIAGNOSIS — Z76.82 ORGAN TRANSPLANT CANDIDATE: ICD-10-CM

## 2023-03-09 DIAGNOSIS — I12.9 RENAL HYPERTENSION: ICD-10-CM

## 2023-03-09 DIAGNOSIS — Z87.891 HISTORY OF TOBACCO USE: ICD-10-CM

## 2023-03-09 PROCEDURE — 93000 ELECTROCARDIOGRAM COMPLETE: CPT | Performed by: INTERNAL MEDICINE

## 2023-03-09 PROCEDURE — 99204 OFFICE O/P NEW MOD 45 MIN: CPT | Performed by: INTERNAL MEDICINE

## 2023-03-09 RX ORDER — BISACODYL 5 MG/1
TABLET, DELAYED RELEASE ORAL
Qty: 4 TABLET | Refills: 0 | Status: SHIPPED | OUTPATIENT
Start: 2023-03-09 | End: 2024-02-27

## 2023-03-09 NOTE — PROGRESS NOTES
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    She has history of ADPKD which has progressed to ESRD; she started on PD 2/15/23. She has no known prior cardiac history.     She has no known family history of CAD, cardiomyopathy, or sudden death.     She walks for 30+ minutes at a brisk pace with her dog or hiking for 1 hour multiple times per week. She can walk up 1 flight of stairs without breath.     She denies any chest pain, dyspnea at rest or with exertion, PND, orthopnea, peripheral edema, palpitations, lightheadedness or syncope.       PAST MEDICAL HISTORY:  Past Medical History:   Diagnosis Date     Anxiety      Chronic kidney disease      Contraception 03/19/2009     Depression      Former tobacco use      Hypertension      Liver disease      PKD (polycystic kidney disease)      Polycystic kidney, unspecified type      PONV (postoperative nausea and vomiting)      Thyroid disease      Varicosities        CURRENT MEDICATIONS:  Current Outpatient Medications   Medication Sig Dispense Refill     amLODIPine (NORVASC) 10 MG tablet Take 1 tablet (10 mg) by mouth daily 90 tablet 3     carvedilol (COREG) 6.25 MG tablet Take 1 tablet (6.25 mg) by mouth 2 times daily (with meals) 180 tablet 3     cyclobenzaprine (FLEXERIL) 10 MG tablet Take 1 tablet (10 mg) by mouth every evening as needed 30 tablet 3     diphenhydrAMINE-acetaminophen (TYLENOL PM)  MG tablet Take 1 tablet by mouth nightly as needed for sleep       docusate sodium (COLACE) 100 MG capsule Take 1 capsule (100 mg) by mouth 2 times daily 30 capsule 1     gabapentin (NEURONTIN) 100 MG capsule Take 1 capsule (100 mg) by mouth At Bedtime 30 capsule 0     medical cannabis (Patient's own supply) 1 Dose See Admin  Instructions (The purpose of this order is to document that the patient reports taking medical cannabis.  This is not a prescription, and is not used to certify that the patient has a qualifying medical condition.)       polyethylene glycol (MIRALAX) 17 GM/Dose powder Take 17 g by mouth daily 510 g 1     senna (SENOKOT) 8.6 MG tablet Take 1 tablet by mouth daily 30 tablet 1     sennosides (SENOKOT) 8.6 MG tablet Take 1 tablet by mouth daily as needed for constipation       traZODone (DESYREL) 50 MG tablet TAKE 1 TABLET(50 MG) BY MOUTH AT BEDTIME 90 tablet 2     varenicline (CHANTIX) 1 MG tablet Take 1 tablet (1 mg) by mouth 2 times daily 60 tablet 2       PAST SURGICAL HISTORY:  Past Surgical History:   Procedure Laterality Date     LAPAROSCOPIC DECORTICATION CYST RENAL  2006     LAPAROSCOPIC INSERTION CATHETER PERITONEAL DIALYSIS Right 2023    Procedure: INSERTION, CATHETER, DIALYSIS, PERITONEAL, LAPAROSCOPIC;  Surgeon: Juanita Hagen MD;  Location: [facility] OR     MYRINGOTOMY, INSERT TUBE(S), ADENOIDECTOMY, COMBINED       PE TUBES  age 13     SURGICAL HISTORY OF -   2005    kidney surgery for cyst removal       ALLERGIES:     Allergies   Allergen Reactions     Bactrim [Sulfamethoxazole W/Trimethoprim] Itching     Seasonal Allergies      Pcn [Penicillin G]        FAMILY HISTORY:  She has no known family history of CAD, cardiomyopathy, or sudden death.     SOCIAL HISTORY:  Social History     Tobacco Use     Smoking status: Former     Packs/day: 0.50     Years: 10.00     Pack years: 5.00     Types: Cigarettes     Quit date: 10/17/2022     Years since quittin.3     Smokeless tobacco: Never   Vaping Use     Vaping Use: Never used   Substance Use Topics     Alcohol use: Yes     Comment: Very occasionally     Drug use: Yes     Types: Marijuana     Comment: Smokes once daily to induce appetite.       ROS:   A comprehensive 14 point review of systems is negative other than as mentioned in  HPI.    Exam:  /79 (BP Location: Left arm, Patient Position: Chair, Cuff Size: Adult Regular)   Pulse 52   Wt 66 kg (145 lb 8 oz)   LMP  (LMP Unknown)   SpO2 98%   BMI 24.98 kg/m    GENERAL APPEARANCE: healthy, alert and no distress  EYES: no icterus, no xanthelasmas  ENT: normal palate, mucosa moist, no central cyanosis  NECK: no adenopathy, no asymmetry, masses, or scars, thyroid normal to palpation and no bruits, JVP not elevated  RESPIRATORY: lungs clear to auscultation - no rales, rhonchi or wheezes, no use of accessory muscles, no retractions, respirations are unlabored, normal respiratory rate  CARDIOVASCULAR: bradycardic, regular rhythm, normal S1 with physiologic split S2, no S3 or S4 and no murmur, click or rub, precordium quiet with normal PMI.  GI: soft, non tender, without hepatosplenomegaly, no masses palpable, bowel sounds normal, aorta not enlarged by palpation, no abdominal bruits  EXTREMITIES: peripheral pulses normal, no edema, no bruits  NEURO: alert and oriented to person/place/time, normal speech, gait and affect  VASC: Radial pulses 2+ bilaterally.  SKIN: no ecchymoses, no rashes.  PSYCH: cooperative, affect appropriate.     Labs:  Reviewed.       Testing/Procedures:  I personally visualized and interpreted:  TTE 12/27/22: Normal LV/RV size/function, LVEF=55-60%  ECG 03/09/23: Sinus bradycardia, VR 47. Aside from rate, normal ECG. No significant change from 12/27/22.    Outside notes/studies reviewed:  ECG 11/15/11 ([facility]): sinus bradycardia with sinus arrhythmia    Assessment and Plan:   1. Preoperative cardiovascular evaluation for renal transplantation  2. Renal transplant candidate  3. ESRD due to ADPKD on PD  Renal is a non-emergent procedure that is intermediate-risk based on ACC/AHA guidelines. The patient has no active severe cardiac conditions. Functional capacity is 4 METS. RCRI=2, predicting 2.4% risk of perioperative cardiac complications.   Given possible change to  perioperative management (changes to medical therapy or revascularization in the case of high-risk anatomy), plan to proceed with exercise stress echocardiogram.  She should hold carvedilol but take all other antihypertensive medications prior to stress echocardiogram.  If exercise echocardiogram is non-diagnostic, would plan for regadenoson SPECT as 2nd-line option.  Provided stress echocardiogram shows no evidence of ischemia, she would be of acceptable cardiac risk for renal transplant without further preoperative cardiac workup.    The patient states understanding and is agreeable with plan.   Review of external notes as documented elsewhere in note  Review of the result(s) of each unique test - ECG, TTE  Independent interpretation of a test performed by another physician/other qualified health care professional (not separately reported) - ECG, TTE  Ordering of each unique test  Prescription drug management (periprocedural management of antihypertensives for stress test)    Jose Manuel Blake MD  Cardiology    CC  DUYEN NIEVES

## 2023-03-09 NOTE — PATIENT INSTRUCTIONS
Take your medicines every day, as directed    Changes made today:  Exercise stress echocardiogram at your convenience  Hold your carvedilol the morning of the stress test; take all other medicines as usual      Cardiology Care Coordinators:      Alma Jaramillo, LORA Cooley, LORA Maldonado, LORA    Cardiology Rooming staff:  RENAN Desai    Phone  [phone]      Fax [phone]    To Contact us    During Business Hours:  [phone]     If you are needing refills please contact your pharmacy.     For urgent after hour care please call the [facility] at [phone] or the [facility] at [phone] and ask to speak to the cardiologist on call.         HOW TO CHECK YOUR BLOOD PRESSURE AT HOME:    Avoid eating, smoking, and exercising for at least 30 minutes before taking a reading.    Be sure you have taken your BP medication at least 2-3 hours before you check it.     Sit quietly for 10 minutes before a reading.     Sit in a chair with your feet flat on the floor. Rest your  arm on a table so that the arm cuff is at the same level as your heart.    Remain still during the reading.  Record your blood pressure and pulse in a log and bring to your next appointment.     Use [employer] allows you to communicate directly with your heart team through secure messaging.  edulio can be accessed any time on your phone, computer, or tablet.  If you need assistance, we'd be happy to help!         Keep your Heart Appointments:    Stress echocardiogram, we will contact you after results

## 2023-03-09 NOTE — NURSING NOTE
Sheri Joyce's goals for this visit include:   Chief Complaint   Patient presents with     New Patient     Referred by Derrick Hernández  Cardiac Clearance for Kidney transplant  TBD       She requests these members of her care team be copied on today's visit information: yes     PCP: Tianna Vieyra    Referring Provider:  Derrick Hernández, APRN CNP  [address] [place] [address]    /79 (BP Location: Left arm, Patient Position: Chair, Cuff Size: Adult Regular)   Pulse 52   Wt 66 kg (145 lb 8 oz)   LMP  (LMP Unknown)   SpO2 98%   BMI 24.98 kg/m      Do you need any medication refills at today's visit? No       JOSE Muir   Cardiology Team  [facility]

## 2023-03-09 NOTE — TELEPHONE ENCOUNTER
Caller: Writer to patient  Reason for Reschedule/Cancellation (please be detailed, any staff messages or encounters to note?): Started dialysis. Staff message below:  Keara Banegas RN  P Endoscopy Scheduling Pool  Patient scheduled for colonoscopy 3/21/23 at      Patient has started dialysis. Will need to be rescheduled at a hospital setting per exclusion criteria. Patient is aware of needing to reschedule. Would someone be able to reach out to patient to facilitate?      Prior to reschedule please review:    Ordering Provider:IVANA FLORES    Sedation per order: Moderate    Does patient have any ASC Exclusions, please identify?: Y - dialysis      Notes on Cancelled Procedure:    Procedure:Lower Endoscopy [Colonoscopy]     Date: 3/21/2023    Location:[facility]; [address], [place] [address]    Surgeon: Lux        Rescheduled: Yes    Procedure: Lower Endoscopy [Colonoscopy]     Date: 3/24/2023    Location:[facility]; [address], [address], [place] [address]    Surgeon: Darren    Sedation Level Scheduled  Moderate,  Reason for Sedation Level Order    Prep/Instructions updated and sent: Iván

## 2023-03-09 NOTE — PROGRESS NOTES
NAME  Sheri Joyce     MRN  [id number]      [birth year]     AGE  52     SEX  Female     HANDEDNESS Left     EDUCATION 14     NEWBERRY  3/6/23     PROVIDER  CE     TECH  KB     STATION  OP            ORIENTATION      Time  0     Place      Personal Info.     Presidents  3 /6                         WRAT   5     SS %ile GE   Word Reading 102 55 >12.9          WASI-II       FSIQ: 113        Raw T    Vocabulary  48 65    Matrix Reasoning 19 50           WAIS IV DIGIT SPAN        Raw ss    Forward  10 10    Backward  6 7    Sequence  7 8    Total  23 8           RDS  9             BOSTON NAMING TEST     Raw 58 /60     ss 13      T 58              COWAT   FAS   Raw 45      ss 11      T 50             TRAIL MAKING TEST       Time Errors ss T   A 27 1 10 50   B 61 0 11 51          ILS HEALTH & SAFETY      Raw 36      T 53      Interp. High              RBANS   A     Raw ss/%ile    List Learning 28 10    Story Memory 24 17    Figure Copy 20 13    Line Orientation 18 51-75    Picture Naming 10 51-75    Semantic Fluency 20 9    Digit Span  8 6    Coding  50 11    List Recall  7 51-75    List Recognition 19 26-50    Story Recall 12 14    Figure Recall 16 12             Index     Immediate Mem. 120     Visuospat./Constr. 116     Language  97     Attention  91     Delayed Mem. 111     Total Scale 109            BDI-II       Raw 13      Interp. Minimal             ALEKSANDRA       Raw 8      Interp. Mild             MMPI-3       RCd 48 CRIN 48    RC1 52 VRIN 47    RC2 57 TREVOR 50    RC4 74 F 50    RC6 50 Fp 67    RC7 58 Fs 42    RC8 55 FBS 48    RC9 48 RBS 39      L 40      K 41
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
## 2023-03-16 ENCOUNTER — DOCUMENTATION ONLY (OUTPATIENT)
Dept: TRANSPLANT | Facility: CLINIC | Age: 53
End: 2023-03-16

## 2023-03-16 ENCOUNTER — TELEPHONE (OUTPATIENT)
Dept: NEPHROLOGY | Facility: CLINIC | Age: 53
End: 2023-03-16
Payer: COMMERCIAL

## 2023-03-16 DIAGNOSIS — Q61.2 ADPKD (AUTOSOMAL DOMINANT POLYCYSTIC KIDNEY DISEASE): ICD-10-CM

## 2023-03-16 DIAGNOSIS — Q61.3 POLYCYSTIC KIDNEY: Primary | ICD-10-CM

## 2023-03-16 RX ORDER — LORAZEPAM 0.5 MG/1
TABLET ORAL
Qty: 1 TABLET | Refills: 0 | Status: CANCELLED | OUTPATIENT
Start: 2023-03-16

## 2023-03-16 NOTE — CONFIDENTIAL NOTE
03/16 Called patient (1st attempt) left voicemail and provided [phone] to schedule MRA.      Neris zee Procedure   Cardiology, Nephrology, Rheumatology, GI, Pulmonology, Infectious Disease Specialties   [facility]

## 2023-03-16 NOTE — TELEPHONE ENCOUNTER
----- Message from Amber Boswell DO sent at 3/16/2023  2:00 PM CDT -----  Please help in getting MRA scheduled.     Amber Boswell DO

## 2023-03-20 ENCOUNTER — DOCUMENTATION ONLY (OUTPATIENT)
Dept: LAB | Facility: CLINIC | Age: 53
End: 2023-03-20
Payer: COMMERCIAL

## 2023-03-20 DIAGNOSIS — Q61.3 POLYCYSTIC KIDNEY: Primary | ICD-10-CM

## 2023-03-20 RX ORDER — LORAZEPAM 0.5 MG/1
0.5 TABLET ORAL DAILY
Qty: 1 TABLET | Refills: 0 | Status: SHIPPED | OUTPATIENT
Start: 2023-03-20 | End: 2024-02-27

## 2023-03-21 ENCOUNTER — LAB (OUTPATIENT)
Dept: LAB | Facility: CLINIC | Age: 53
End: 2023-03-21
Payer: COMMERCIAL

## 2023-03-21 ENCOUNTER — TELEPHONE (OUTPATIENT)
Dept: CARDIOLOGY | Facility: CLINIC | Age: 53
End: 2023-03-21

## 2023-03-21 DIAGNOSIS — N18.5 CHRONIC KIDNEY DISEASE, STAGE 5 (H): Primary | ICD-10-CM

## 2023-03-21 DIAGNOSIS — N18.6 ESRD (END STAGE RENAL DISEASE) (H): ICD-10-CM

## 2023-03-21 DIAGNOSIS — Z76.82 ORGAN TRANSPLANT CANDIDATE: ICD-10-CM

## 2023-03-21 DIAGNOSIS — N18.6 ESRD (END STAGE RENAL DISEASE) (H): Primary | ICD-10-CM

## 2023-03-21 PROCEDURE — 999N001093 HLA VIRTUAL CROSSMATCH (VXM), LIVING DONOR

## 2023-03-21 PROCEDURE — 86833 HLA CLASS II HIGH DEFIN QUAL: CPT

## 2023-03-21 PROCEDURE — 36415 COLL VENOUS BLD VENIPUNCTURE: CPT

## 2023-03-21 PROCEDURE — 86832 HLA CLASS I HIGH DEFIN QUAL: CPT

## 2023-03-21 NOTE — TELEPHONE ENCOUNTER
[facility] Call Center    Phone Message    May a detailed message be left on voicemail: yes     Reason for Call: Other: please call pt to schedule an Exercise Echo STress  appt.  She wants to do it in [place]     Action Taken: Message routed to:  [facility] cardio    Travel Screening: Not Applicable     Thank you!  [facility]

## 2023-03-22 LAB
PROTOCOL CUTOFF: NORMAL
SA 1 CELL: NORMAL
SA 1 TEST METHOD: NORMAL
SA 2 CELL: NORMAL
SA 2 TEST METHOD: NORMAL
SA1 HI RISK ABY: NORMAL
SA1 MOD RISK ABY: NORMAL
SA2 HI RISK ABY: NORMAL
SA2 MOD RISK ABY: NORMAL
UNACCEPTABLE ANTIGENS: NORMAL
UNOS CPRA: 98
ZZZSA 1  COMMENTS: NORMAL
ZZZSA 2 COMMENTS: NORMAL

## 2023-03-23 ENCOUNTER — TELEPHONE (OUTPATIENT)
Dept: GASTROENTEROLOGY | Facility: CLINIC | Age: 53
End: 2023-03-23
Payer: COMMERCIAL

## 2023-03-23 DIAGNOSIS — N18.6 ESRD (END STAGE RENAL DISEASE) (H): Primary | ICD-10-CM

## 2023-03-23 NOTE — TELEPHONE ENCOUNTER
Caller: Sheri  Reason for Reschedule/Cancellation (please be detailed, any staff messages or encounters to note?): Patient       Prior to reschedule please review:    Ordering Provider:Jarrell    Sedation per order:CS    Does patient have any ASC Exclusions, please identify?: Yes-on dialysis since February      Notes on Cancelled Procedure:    Procedure:Lower Endoscopy [Colonoscopy]     Date: 3/24/23    Location:[facility]; [address] [place] [address]    Surgeon: Darren        Rescheduled: Yes    Procedure: Lower Endoscopy [Colonoscopy]     Date: 4/7/23    Location:[facility]; [address], [place] [address]    Surgeon: Alexander    Sedation Level Scheduled  CS,  Reason for Sedation Level Protocol    Prep/Instructions updated and sent: Yes

## 2023-03-23 NOTE — TELEPHONE ENCOUNTER
Appointment scheduled, closing encounter.      JOSE Muir   Cardiology Team  [facility]

## 2023-03-24 NOTE — TELEPHONE ENCOUNTER
Attempted to contact patient regarding upcoming Colonoscopy  procedure on 4/7/23 for pre assessment questions. No answer.     Left message to return call to [phone] #4    Discuss Covid policy and designated  policy.    Pre op exam? N/A    Arrival time: 1415. Procedure time: 1515    Facility location: [facility]; [address], [place] [address]    Sedation type: Conscious sedation     Anticoagulants: No    Electronic implanted devices? No    Diabetic? No     Patient is stage 5 CKD and on dialysis    Patient has medical cannabis, advise not to use day of procedure.     Indication for procedure: screening colonoscopy    Bowel prep recommendation: Standard Golytely      Prep instructions sent via [employer]. Bowel prep script sent to [id number] - [place], [place] - [address]    Verify if patient picked up rx    Nanette Mahan RN  Endoscopy Procedure Pre Assessment RN

## 2023-03-29 LAB
CROSSMATCHDATEVXM: NORMAL
DONOR VXM: NORMAL
DSA VXM B1: NORMAL
DSA VXM B2: NORMAL
DSA VXMT1: NORMAL
DSA VXMT2: NORMAL
RESULT VXM B1: NORMAL
RESULT VXM B2: NORMAL
RESULT VXM T1: NORMAL
RESULT VXM T2: NORMAL
SERUM DATE VXM B1: NORMAL
SERUM DATE VXM B2: NORMAL
SERUM DATE VXM T1: NORMAL
SERUM DATE VXM T2: NORMAL
ZZZCOMMENT VXMB1: NORMAL
ZZZCOMMENT VXMB2: NORMAL
ZZZCOMMENT VXMT1: NORMAL
ZZZCOMMENT VXMT2: NORMAL

## 2023-03-31 NOTE — TELEPHONE ENCOUNTER
Second attempt for pre-assessment prior to upcoming colonoscopy 4.7.23    No answer.  Left message to return call [phone] #4    Basic[employer] message sent    Cristela Vanessa RN  Endoscopy Procedure Pre Assessment RN

## 2023-04-04 DIAGNOSIS — F17.210 CIGARETTE NICOTINE DEPENDENCE WITHOUT COMPLICATION: ICD-10-CM

## 2023-04-04 RX ORDER — VARENICLINE TARTRATE 1 MG/1
1 TABLET, FILM COATED ORAL 2 TIMES DAILY
Qty: 60 TABLET | Refills: 2 | Status: ON HOLD | OUTPATIENT
Start: 2023-04-04 | End: 2024-03-21

## 2023-04-04 NOTE — TELEPHONE ENCOUNTER
Medication: varenicline (CHANTIX) 1 MG tablet  Sig: Take 1 tablet (1 mg) by mouth 2 times daily     Date last Filled: 1/08/2023      Requested Pharmacy: [facility] [place], [phone]    Pt's last office visit: 12/28/22  Next scheduled office visit: none      Refill sent to patient's pharmacy.  LORA Gooden

## 2023-04-05 NOTE — TELEPHONE ENCOUNTER
Pt returning call    Pre assessment questions completed for upcoming Colonoscopy  procedure scheduled on 4/7/23    COVID policy reviewed.     Reviewed procedural arrival time 1415, procedure time 1515 and facility location [facility]; [address], [address], [place] [address]    Designated  policy reviewed. Instructed to have someone stay 6 hours post procedure.     Reviewed procedure prep instructions.     Pt was instructed not to use her medical cannabis day of procedure.    Patient verbalized understanding and had no questions or concerns at this time.    Keara Cam RN  Endoscopy Procedure Pre Assessment RN

## 2023-04-06 NOTE — H&P
Sheri Cohen [facility]  [id number]  female  52 year old      Reason for procedure/surgery: Presents for colonoscopy for CRC screening. 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 She received Flagyl, Cipro and Keflex this morning prior to the procedure.      Patient Active Problem List   Diagnosis     Acquired hypothyroidism     Polycystic kidney     Family history of malignant neoplasm of breast     CARDIOVASCULAR SCREENING; LDL GOAL LESS THAN 100     Hypertension goal BP (blood pressure) < 130/80     Chronic kidney disease, stage 5 (H)     Former tobacco use     Anxiety     Depression       Past Surgical History:    Past Surgical History:   Procedure Laterality Date     LAPAROSCOPIC DECORTICATION CYST RENAL  2006     LAPAROSCOPIC INSERTION CATHETER PERITONEAL DIALYSIS Right 2023    Procedure: INSERTION, CATHETER, DIALYSIS, PERITONEAL, LAPAROSCOPIC;  Surgeon: Juanita Hagen MD;  Location: [facility] OR     MYRINGOTOMY, INSERT TUBE(S), ADENOIDECTOMY, COMBINED       PE TUBES  age 13     SURGICAL HISTORY OF -   2005    kidney surgery for cyst removal       Past Medical History:   Past Medical History:   Diagnosis Date     Anxiety      Chronic kidney disease      Contraception 2009     Depression      Former tobacco use      Hypertension      Liver disease      PKD (polycystic kidney disease)      Polycystic kidney, unspecified type      PONV (postoperative nausea and vomiting)      Thyroid disease      Varicosities        Social History:   Social History     Tobacco Use     Smoking status: Former     Packs/day: 0.50     Years: 10.00     Pack years: 5.00     Types: Cigarettes     Quit date: 10/17/2022     Years since quittin.4     Smokeless tobacco: Never   Vaping Use     Vaping status: Never Used   Substance Use Topics     Alcohol use: Yes     Comment: Very occasionally       Family History:   Family History   Problem Relation Age of Onset     Lung Cancer Mother          lung     Hypertension Father      Breast Cancer Maternal Aunt      Breast Cancer Cousin      Breast Cancer Cousin      Cerebrovascular Disease Paternal Aunt 70     Diabetes No family hx of      C.A.D. No family hx of      Cancer - colorectal No family hx of      Prostate Cancer No family hx of        Allergies:   Allergies   Allergen Reactions     Bactrim [Sulfamethoxazole W/Trimethoprim] Itching     Seasonal Allergies      Pcn [Penicillin G]        Active Medications:   Current Outpatient Medications   Medication Sig Dispense Refill     amLODIPine (NORVASC) 10 MG tablet Take 1 tablet (10 mg) by mouth daily 90 tablet 3     bisacodyl (DULCOLAX) 5 MG EC tablet Take 2 tablets at 3 pm the day before your procedure. If your procedure is before 11 am, take 2 additional tablets at 11 pm. If your procedure is after 11 am, take 2 additional tablets at 6 am. For additional instructions refer to your colonoscopy prep instructions. 4 tablet 0     carvedilol (COREG) 6.25 MG tablet Take 1 tablet (6.25 mg) by mouth 2 times daily (with meals) 180 tablet 3     cephALEXin (KEFLEX) 250 MG capsule Take 1 capsule (250 mg) by mouth daily 1 capsule 0     ciprofloxacin (CIPRO) 500 MG tablet Take 1 tablet (500 mg) by mouth daily 1 tablet 0     cyclobenzaprine (FLEXERIL) 10 MG tablet Take 1 tablet (10 mg) by mouth every evening as needed 30 tablet 3     diphenhydrAMINE-acetaminophen (TYLENOL PM)  MG tablet Take 1 tablet by mouth nightly as needed for sleep       docusate sodium (COLACE) 100 MG capsule Take 1 capsule (100 mg) by mouth 2 times daily 30 capsule 1     gabapentin (NEURONTIN) 100 MG capsule Take 1 capsule (100 mg) by mouth At Bedtime 30 capsule 0     LORazepam (ATIVAN) 0.5 MG tablet Take 1 tablet (0.5 mg) by mouth daily 30 minutes prior to MRI- take on location. Do not drive after taking this medication. Arrange for transportation. 1 tablet 0     medical cannabis (Patient's own supply) 1 Dose See Admin Instructions (The  purpose of this order is to document that the patient reports taking medical cannabis.  This is not a prescription, and is not used to certify that the patient has a qualifying medical condition.)       metroNIDAZOLE (FLAGYL) 250 MG tablet Take 1 tablet (250 mg) by mouth daily 1 tablet 0     polyethylene glycol (GOLYTELY) 236 g suspension The night before the exam at 6 pm drink an 8-ounce glass every 15 minutes until the jug is half empty. If you arrive before 11 AM: Drink the other half of the Golytely jug at 11 PM night before procedure. If you arrive after 11 AM: Drink the other half of the Golytely jug at 6 AM day of procedure. For additional instructions refer to your colonoscopy prep instructions. 4000 mL 0     polyethylene glycol (MIRALAX) 17 GM/Dose powder Take 17 g by mouth daily 510 g 1     senna (SENOKOT) 8.6 MG tablet Take 1 tablet by mouth daily 30 tablet 1     sennosides (SENOKOT) 8.6 MG tablet Take 1 tablet by mouth daily as needed for constipation       traZODone (DESYREL) 50 MG tablet TAKE 1 TABLET(50 MG) BY MOUTH AT BEDTIME 90 tablet 2     varenicline (CHANTIX) 1 MG tablet Take 1 tablet (1 mg) by mouth 2 times daily 60 tablet 2       Systemic Review:   CONSTITUTIONAL: NEGATIVE for fever, chills, change in weight  ENT/MOUTH: NEGATIVE for ear, mouth and throat problems  RESP: NEGATIVE for significant cough or SOB  CV: NEGATIVE for chest pain, palpitations or peripheral edema    Physical Examination:   Vital Signs: LMP  (LMP Unknown)   GENERAL: healthy, alert and no distress  NECK: no adenopathy, no asymmetry, masses, or scars  RESP: lungs clear to auscultation - no rales, rhonchi or wheezes  CV: regular rate and rhythm, normal S1 S2, no S3 or S4, no murmur, click or rub, no peripheral edema and peripheral pulses strong  ABDOMEN: soft, nontender, no hepatosplenomegaly, no masses and bowel sounds normal  MS: no gross musculoskeletal defects noted, no edema    Plan: Appropriate to proceed as  scheduled.      Lizzie Munoz MD  4/6/2023    PCP:  Tianna Vieyra

## 2023-04-07 ENCOUNTER — HOSPITAL ENCOUNTER (OUTPATIENT)
Facility: CLINIC | Age: 53
Discharge: HOME | End: 2023-04-07
Attending: INTERNAL MEDICINE | Admitting: INTERNAL MEDICINE
Payer: COMMERCIAL

## 2023-04-07 VITALS
DIASTOLIC BLOOD PRESSURE: 94 MMHG | RESPIRATION RATE: 16 BRPM | OXYGEN SATURATION: 99 % | HEART RATE: 68 BPM | SYSTOLIC BLOOD PRESSURE: 117 MMHG

## 2023-04-07 LAB — COLONOSCOPY: NORMAL

## 2023-04-07 PROCEDURE — 45385 COLONOSCOPY W/LESION REMOVAL: CPT | Mod: PT | Performed by: INTERNAL MEDICINE

## 2023-04-07 PROCEDURE — 99153 MOD SED SAME PHYS/QHP EA: CPT | Performed by: INTERNAL MEDICINE

## 2023-04-07 PROCEDURE — 250N000011 HC RX IP 250 OP 636: Performed by: INTERNAL MEDICINE

## 2023-04-07 PROCEDURE — 45380 COLONOSCOPY AND BIOPSY: CPT | Performed by: INTERNAL MEDICINE

## 2023-04-07 PROCEDURE — G0500 MOD SEDAT ENDO SERVICE >5YRS: HCPCS | Performed by: INTERNAL MEDICINE

## 2023-04-07 PROCEDURE — 88305 TISSUE EXAM BY PATHOLOGIST: CPT | Mod: TC | Performed by: INTERNAL MEDICINE

## 2023-04-07 PROCEDURE — 88305 TISSUE EXAM BY PATHOLOGIST: CPT | Mod: 26 | Performed by: PATHOLOGY

## 2023-04-07 PROCEDURE — 999N000099 HC STATISTIC MODERATE SEDATION < 10 MIN: Performed by: INTERNAL MEDICINE

## 2023-04-07 RX ORDER — FENTANYL CITRATE 50 UG/ML
INJECTION, SOLUTION INTRAMUSCULAR; INTRAVENOUS PRN
Status: DISCONTINUED | OUTPATIENT
Start: 2023-04-07 | End: 2023-04-07 | Stop reason: HOSPADM

## 2023-04-07 RX ORDER — ONDANSETRON 4 MG/1
4 TABLET, ORALLY DISINTEGRATING ORAL EVERY 6 HOURS PRN
Status: DISCONTINUED | OUTPATIENT
Start: 2023-04-07 | End: 2023-04-07 | Stop reason: HOSPADM

## 2023-04-07 RX ORDER — LIDOCAINE 40 MG/G
CREAM TOPICAL
Status: DISCONTINUED | OUTPATIENT
Start: 2023-04-07 | End: 2023-04-07 | Stop reason: HOSPADM

## 2023-04-07 RX ORDER — NALOXONE HYDROCHLORIDE 0.4 MG/ML
0.2 INJECTION, SOLUTION INTRAMUSCULAR; INTRAVENOUS; SUBCUTANEOUS
Status: DISCONTINUED | OUTPATIENT
Start: 2023-04-07 | End: 2023-04-07 | Stop reason: HOSPADM

## 2023-04-07 RX ORDER — FLUMAZENIL 0.1 MG/ML
0.2 INJECTION, SOLUTION INTRAVENOUS
Status: DISCONTINUED | OUTPATIENT
Start: 2023-04-07 | End: 2023-04-07 | Stop reason: HOSPADM

## 2023-04-07 RX ORDER — ONDANSETRON 2 MG/ML
4 INJECTION INTRAMUSCULAR; INTRAVENOUS EVERY 6 HOURS PRN
Status: DISCONTINUED | OUTPATIENT
Start: 2023-04-07 | End: 2023-04-07 | Stop reason: HOSPADM

## 2023-04-07 RX ORDER — ONDANSETRON 2 MG/ML
4 INJECTION INTRAMUSCULAR; INTRAVENOUS
Status: DISCONTINUED | OUTPATIENT
Start: 2023-04-07 | End: 2023-04-07 | Stop reason: HOSPADM

## 2023-04-07 RX ORDER — PROCHLORPERAZINE MALEATE 5 MG
10 TABLET ORAL EVERY 6 HOURS PRN
Status: DISCONTINUED | OUTPATIENT
Start: 2023-04-07 | End: 2023-04-07 | Stop reason: HOSPADM

## 2023-04-07 RX ORDER — NALOXONE HYDROCHLORIDE 0.4 MG/ML
0.4 INJECTION, SOLUTION INTRAMUSCULAR; INTRAVENOUS; SUBCUTANEOUS
Status: DISCONTINUED | OUTPATIENT
Start: 2023-04-07 | End: 2023-04-07 | Stop reason: HOSPADM

## 2023-04-07 RX ORDER — ONDANSETRON 2 MG/ML
INJECTION INTRAMUSCULAR; INTRAVENOUS PRN
Status: DISCONTINUED | OUTPATIENT
Start: 2023-04-07 | End: 2023-04-07 | Stop reason: HOSPADM

## 2023-04-07 ASSESSMENT — ACTIVITIES OF DAILY LIVING (ADL): ADLS_ACUITY_SCORE: 35

## 2023-04-07 NOTE — OR NURSING
Pt tolerated colonoscopy with polypectomy, very well, ubder conscious sedation. Cold Exacto snare was used  
Never smoker

## 2023-04-07 NOTE — LETTER
"April 20, 2023      Sheri Cohen [place]  [address] [place] [address]        Dear ,    We are writing to inform you of your test results.    We removed one polyp during your recent colonoscopy. The pathology results show sessile serrated adenoma with no high risk features. This means you will need repeat colonoscopy in 7-10 years.      Resulted Orders   Surgical Pathology Exam   Result Value Ref Range    Case Report       Surgical Pathology Report                         Case: [id number]                                  Authorizing Provider:  Lizzie Munoz MD      Collected:           04/07/2023 03:30 PM          Ordering Location:     [facility]          Received:            04/07/2023 03:48 PM                                 Endoscopy                                                                    Pathologist:           Ed Carney DO                                                            Specimen:    Large Intestine, Colon, Transverse, Colon biopsy, transverse                               Final Diagnosis       A.  COLON, TRANSVERSE, POLYP:  - Sessile serrated adenoma  - No cytologic dysplasia identified        Clinical Information       Screen for colon cancer      Gross Description       A(1). Large Intestine, Colon, Transverse, Colon biopsy, transverse:  The specimen is received in formalin with proper patient identification, labeled \"transverse colon polyp\".  The specimen consists of a 1.5 x 1.5 x 0.1 cm aggregate of red-brown soft tissue, which is entirely submitted in cassette A1.       Microscopic Description       Microscopic examination was performed.        Performing Labs       The technical component of this testing was completed at [facility]        Case Images         If you have any questions or concerns, please call the clinic at the number listed above.       Sincerely,      Lizzie Munoz, " MD

## 2023-04-10 LAB
PATH REPORT.COMMENTS IMP SPEC: NORMAL
PATH REPORT.COMMENTS IMP SPEC: NORMAL
PATH REPORT.FINAL DX SPEC: NORMAL
PATH REPORT.GROSS SPEC: NORMAL
PATH REPORT.MICROSCOPIC SPEC OTHER STN: NORMAL
PATH REPORT.RELEVANT HX SPEC: NORMAL
PHOTO IMAGE: NORMAL

## 2023-04-13 ENCOUNTER — ANCILLARY PROCEDURE (OUTPATIENT)
Dept: MRI IMAGING | Facility: CLINIC | Age: 53
End: 2023-04-13
Attending: INTERNAL MEDICINE
Payer: COMMERCIAL

## 2023-04-13 ENCOUNTER — TELEPHONE (OUTPATIENT)
Dept: NEPHROLOGY | Facility: CLINIC | Age: 53
End: 2023-04-13

## 2023-04-13 DIAGNOSIS — I67.1 BRAIN ANEURYSM: Primary | ICD-10-CM

## 2023-04-13 DIAGNOSIS — Q61.2 ADPKD (AUTOSOMAL DOMINANT POLYCYSTIC KIDNEY DISEASE): ICD-10-CM

## 2023-04-13 LAB — RADIOLOGIST FLAGS: NORMAL

## 2023-04-13 PROCEDURE — 70544 MR ANGIOGRAPHY HEAD W/O DYE: CPT | Performed by: STUDENT IN AN ORGANIZED HEALTH CARE EDUCATION/TRAINING PROGRAM

## 2023-04-13 NOTE — TELEPHONE ENCOUNTER
Received a call from [facility] imaging team regarding MRA result. New finding as follows:  Impression: New 5 mm right MCA trifurcation aneurysm.  Routing high priority encounter to provider to review.

## 2023-04-19 ENCOUNTER — TELEPHONE (OUTPATIENT)
Dept: NEUROSURGERY | Facility: CLINIC | Age: 53
End: 2023-04-19
Payer: MEDICARE

## 2023-04-19 NOTE — TELEPHONE ENCOUNTER
[facility] Call Center    Phone Message    May a detailed message be left on voicemail: yes     Reason for Call: Other: [facility] to check on the scheduling status of this urgent referral. Please review and call patient to schedule.     Action Taken: Message routed to:  [facility] ([facility]    Travel Screening: Not Applicable

## 2023-04-20 NOTE — TELEPHONE ENCOUNTER
RECORDS RECEIVED FROM: internal   REASON FOR VISIT: Brain aneurysm   Date of Appt: 4/25/23   NOTES (FOR ALL VISITS) STATUS DETAILS   OFFICE NOTE from referring provider Internal Dr Amber Boswell @ [facility]:  4/13/23mychart encounter  3/20/23 mychart encounter   OFFICE NOTE from other specialist Internal Neuropsych Eval @ [facility]:  3/6/23   MEDICATION LIST Internal    IMAGING  (FOR ALL VISITS)     MRI (HEAD, NECK, SPINE) Internal [facility]:  MRA Brain COW 4/13/23

## 2023-04-25 ENCOUNTER — VIRTUAL VISIT (OUTPATIENT)
Dept: NEUROSURGERY | Facility: CLINIC | Age: 53
End: 2023-04-25
Payer: COMMERCIAL

## 2023-04-25 ENCOUNTER — PRE VISIT (OUTPATIENT)
Dept: NEUROSURGERY | Facility: CLINIC | Age: 53
End: 2023-04-25

## 2023-04-25 ENCOUNTER — TELEPHONE (OUTPATIENT)
Dept: CARDIOLOGY | Facility: CLINIC | Age: 53
End: 2023-04-25

## 2023-04-25 ENCOUNTER — TELEPHONE (OUTPATIENT)
Dept: RADIOLOGY | Facility: CLINIC | Age: 53
End: 2023-04-25

## 2023-04-25 DIAGNOSIS — I67.1 BRAIN ANEURYSM: ICD-10-CM

## 2023-04-25 PROCEDURE — 99203 OFFICE O/P NEW LOW 30 MIN: CPT | Mod: VID | Performed by: RADIOLOGY

## 2023-04-25 NOTE — TELEPHONE ENCOUNTER
1st attempt for 6 month video and scheudled with pt for 10/17 at 0845. Transferred to imaging at [phone]

## 2023-04-25 NOTE — PATIENT INSTRUCTIONS
Follow-up with Dr. Hayes in 6 months with an MRA prior to your appointment.    If you have any questions please contact me at [phone], option 1. After business hours call the  at [phone] and have the Neuro-Interventional Fellow paged.    Renetta Nazario RN, CNRN, SCRN  Stroke & Endovascular Care Coordinator    Thank you for choosing [facility] for your health care needs.

## 2023-04-25 NOTE — TELEPHONE ENCOUNTER
04/05  Called patient (1st attempt) to schedule echocardiogram. Patient stated that she needs to consult with her Neurologist prior to scheduling echo. Once she has been cleared from neurology she will call back to schedule. I provided [phone] to schedule.     Neris zee Procedure   Cardiology, Nephrology, Rheumatology, GI, Pulmonology, Infectious Disease Specialties   [facility]

## 2023-04-25 NOTE — NURSING NOTE
Is the patient currently in the state of [place]? YES    Visit mode:VIDEO    If the visit is dropped, the patient can be reconnected by: VIDEO VISIT: Text to cell phone: [phone]    Will anyone else be joining the visit? NO      How would you like to obtain your AVS? MyChart    Are changes needed to the allergy or medication list? NO    Reason for visit: Video Visit (Brain aneurysm )

## 2023-04-25 NOTE — LETTER
4/25/2023       RE: Sheri Joyce  [address]  [facility] [address]       Dear Colleague,    Thank you for referring your patient, Sheri Joyce, to the [facility] [place] at [facility]. Please see a copy of my visit note below.                                                                         [facility] [place]  [address]  [address]RD [address]  [facility] [address]  Phone: [phone]  Fax: [phone]    Neuro-interventional clinic progress note:    Reason for clinic visit: Right MCA aneurysm      History of present Illness: Sheri Joyce is a 52-year-old female patient with history of autosomal dominant PKD, complicated by ESRD on HD, hypertension, and tobacco abuse who presents for evaluation of a right MCA trifurcation 5 mm saccular aneurysm.  She underwent MRA screening for aneurysms due to her PKD in 2005, and this lesion was not present at that time.  Again, imaging was obtained for aneurysm screening, in the context of preparation for kidney transplant.  Her blood pressure is well controlled.  She has quit smoking on a number of occasions and most recently restarted 2 weeks ago, but she is starting Chantix and intends to quit shortly.  She has no personal or family history of other brain aneurysm, or subarachnoid hemorrhage.      Past Medical History:  Past Medical History:   Diagnosis Date    Anxiety     Chronic kidney disease     Contraception 03/19/2009    Depression     Former tobacco use     Hypertension     Liver disease     PKD (polycystic kidney disease)     Polycystic kidney, unspecified type     PONV (postoperative nausea and vomiting)     Thyroid disease     Varicosities        Past Surgical History:  Past Surgical History:   Procedure Laterality Date    COLONOSCOPY N/A 4/7/2023    Procedure: Colonoscopy;  Surgeon: Lizzie Munoz MD;  Location:  GI     LAPAROSCOPIC DECORTICATION CYST RENAL  2006    LAPAROSCOPIC INSERTION CATHETER PERITONEAL DIALYSIS Right 2023    Procedure: INSERTION, CATHETER, DIALYSIS, PERITONEAL, LAPAROSCOPIC;  Surgeon: Juanita Hagen MD;  Location: [facility] OR    MYRINGOTOMY, INSERT TUBE(S), ADENOIDECTOMY, COMBINED      PE TUBES  age 13    SURGICAL HISTORY OF -   2005    kidney surgery for cyst removal       Social History:  Social History     Socioeconomic History    Marital status:      Spouse name: Not on file    Number of children: Not on file    Years of education: Not on file    Highest education level: Not on file   Occupational History    Not on file   Tobacco Use    Smoking status: Former     Packs/day: 0.50     Years: 10.00     Pack years: 5.00     Types: Cigarettes     Quit date: 10/17/2022     Years since quittin.5    Smokeless tobacco: Never   Vaping Use    Vaping status: Never Used   Substance and Sexual Activity    Alcohol use: Yes     Comment: Very occasionally    Drug use: Yes     Types: Marijuana     Comment: Smokes once daily to induce appetite.    Sexual activity: Yes     Partners: Male     Birth control/protection: None   Other Topics Concern    Parent/sibling w/ CABG, MI or angioplasty before 65F 55M? No   Social History Narrative    ** Merged History Encounter **         Dairy/d 2 servings/d.     Caffeine 3 servings/d    Exercise 5-7 x week walking    Sunscreen used - Yes    Seatbelts used - Yes    Working smoke/CO detectors in the home - NO    Guns stored in the home - No    Self Breast Exams - Yes    Self Testicular Exam - NA    Eye Exam up to date - Yes    Dental Exam up to date - No    Pap Smear up to date - Yes    Mammogram up to date - No    PSA up to date - NA    Dexa Scan up to date - NA    Flex Sig / Colonoscopy up to date - NA    Immunizations up to date - No    Abuse: Current or Past(Physical, Sexual or Emotional)- No    Do you feel safe in your environment - Yes    [employer]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         Social Determinants of Health     Financial Resource Strain: Not on file   Food Insecurity: Not on file   Transportation Needs: Not on file   Physical Activity: Not on file   Stress: Not on file   Social Connections: Not on file   Intimate Partner Violence: Not on file   Housing Stability: Not on file       Family History:  Family History   Problem Relation Age of Onset    Lung Cancer Mother         lung    Hypertension Father     Breast Cancer Maternal Aunt     Breast Cancer Cousin     Breast Cancer Cousin     Cerebrovascular Disease Paternal Aunt 70    Diabetes No family hx of     C.A.D. No family hx of     Cancer - colorectal No family hx of     Prostate Cancer No family hx of        Home Medications:  Current Outpatient Medications   Medication    amLODIPine (NORVASC) 10 MG tablet    carvedilol (COREG) 6.25 MG tablet    cephALEXin (KEFLEX) 250 MG capsule    ciprofloxacin (CIPRO) 500 MG tablet    cyclobenzaprine (FLEXERIL) 10 MG tablet    diphenhydrAMINE-acetaminophen (TYLENOL PM)  MG tablet    docusate sodium (COLACE) 100 MG capsule    gabapentin (NEURONTIN) 100 MG capsule    medical cannabis (Patient's own supply)    metroNIDAZOLE (FLAGYL) 250 MG tablet    polyethylene glycol (MIRALAX) 17 GM/Dose powder    senna (SENOKOT) 8.6 MG tablet    sennosides (SENOKOT) 8.6 MG tablet    traZODone (DESYREL) 50 MG tablet    varenicline (CHANTIX) 1 MG tablet    bisacodyl (DULCOLAX) 5 MG EC tablet    LORazepam (ATIVAN) 0.5 MG tablet    polyethylene glycol (GOLYTELY) 236 g suspension     No current facility-administered medications for this visit.       Allergies:  Allergies   Allergen Reactions    Bactrim [Sulfamethoxazole W/Trimethoprim] Itching    Oxycodone-Acetaminophen Nausea and Vomiting    Seasonal Allergies     Pcn [Penicillin G]        Physical Examination:  Video visit  Awake, alert, attentive, fully oriented, language is fluent and coherent  Visual fields full, PERRL, EOMI, face  symmetric, speech is nondysarthric  Moves 4 of 4 extremities antigravity without drift    Laboratory findings:  Reviewed    Imaging findings:  MRA brain reviewed- 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  While her risk of the aneurysm incidence is higher than the general population, her risk of rupture is comparable to that of other patients with incidentally noted brain aneurysms.  5-year rupture risk of the lesion of this size, in this location is less than 1%.  There is no contraindication or need for delay of kidney transplant from a neurovascular standpoint.    Plan:  Repeat MRA in 6 months    Patient seen and discussed with the attending, Dr. Hayes.      Attestation signed by Jesus Hayes MD at 5/1/2023  7:52 AM:  I have personally seen and evaluated the patient on 4/25/2023  and I agree with the note by Dr. Richards               On May 1, 2023        Again, thank you for allowing me to participate in the care of your patient.      Sincerely,    Jesus Hayes MD

## 2023-04-25 NOTE — PROGRESS NOTES
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  She underwent MRA screening for aneurysms due to her PKD in 2005, and this lesion was not present at that time.  Again, imaging was obtained for aneurysm screening, in the context of preparation for kidney transplant.  Her blood pressure is well controlled.  She has quit smoking on a number of occasions and most recently restarted 2 weeks ago, but she is starting Chantix and intends to quit shortly.  She has no personal or family history of other brain aneurysm, or subarachnoid hemorrhage.      Past Medical History:  Past Medical History:   Diagnosis Date     Anxiety      Chronic kidney disease      Contraception 03/19/2009     Depression      Former tobacco use      Hypertension      Liver disease      PKD (polycystic kidney disease)      Polycystic kidney, unspecified type      PONV (postoperative nausea and vomiting)      Thyroid disease      Varicosities        Past Surgical History:  Past Surgical History:   Procedure Laterality Date     COLONOSCOPY N/A 4/7/2023    Procedure: Colonoscopy;  Surgeon: Lizzie Munoz MD;  Location: [facility]     LAPAROSCOPIC DECORTICATION CYST RENAL  01/01/2006     LAPAROSCOPIC INSERTION CATHETER PERITONEAL DIALYSIS Right 1/11/2023    Procedure: INSERTION, CATHETER, DIALYSIS, PERITONEAL, LAPAROSCOPIC;  Surgeon: Juanita Hagen MD;  Location: [facility]     MYRINGOTOMY, INSERT TUBE(S), ADENOIDECTOMY, COMBINED       PE  TUBES  age 13     SURGICAL HISTORY OF -   2005    kidney surgery for cyst removal       Social History:  Social History     Socioeconomic History     Marital status:      Spouse name: Not on file     Number of children: Not on file     Years of education: Not on file     Highest education level: Not on file   Occupational History     Not on file   Tobacco Use     Smoking status: Former     Packs/day: 0.50     Years: 10.00     Pack years: 5.00     Types: Cigarettes     Quit date: 10/17/2022     Years since quittin.5     Smokeless tobacco: Never   Vaping Use     Vaping status: Never Used   Substance and Sexual Activity     Alcohol use: Yes     Comment: Very occasionally     Drug use: Yes     Types: Marijuana     Comment: Smokes once daily to induce appetite.     Sexual activity: Yes     Partners: Male     Birth control/protection: None   Other Topics Concern     Parent/sibling w/ CABG, MI or angioplasty before 65F 55M? No   Social History Narrative    ** Merged History Encounter **         Dairy/d 2 servings/d.     Caffeine 3 servings/d    Exercise 5-7 x week walking    Sunscreen used - Yes    Seatbelts used - Yes    Working smoke/CO detectors in the home - NO    Guns stored in the home - No    Self Breast Exams - Yes    Self Testicular Exam - NA    Eye Exam up to date - Yes    Dental Exam up to date - No    Pap Smear up to date - Yes    Mammogram up to date - No    PSA up to date - NA    Dexa Scan up to date - NA    Flex Sig / Colonoscopy up to date - NA    Immunizations up to date - No    Abuse: Current or Past(Physical, Sexual or Emotional)- No    Do you feel safe in your environment - Yes    HONG KOCH LPN.    06         Social Determinants of Health     Financial Resource Strain: Not on file   Food Insecurity: Not on file   Transportation Needs: Not on file   Physical Activity: Not on file   Stress: Not on file   Social Connections: Not on file   Intimate Partner Violence: Not on file    Housing Stability: Not on file       Family History:  Family History   Problem Relation Age of Onset     Lung Cancer Mother         lung     Hypertension Father      Breast Cancer Maternal Aunt      Breast Cancer Cousin      Breast Cancer Cousin      Cerebrovascular Disease Paternal Aunt 70     Diabetes No family hx of      C.A.D. No family hx of      Cancer - colorectal No family hx of      Prostate Cancer No family hx of        Home Medications:  Current Outpatient Medications   Medication     amLODIPine (NORVASC) 10 MG tablet     carvedilol (COREG) 6.25 MG tablet     cephALEXin (KEFLEX) 250 MG capsule     ciprofloxacin (CIPRO) 500 MG tablet     cyclobenzaprine (FLEXERIL) 10 MG tablet     diphenhydrAMINE-acetaminophen (TYLENOL PM)  MG tablet     docusate sodium (COLACE) 100 MG capsule     gabapentin (NEURONTIN) 100 MG capsule     medical cannabis (Patient's own supply)     metroNIDAZOLE (FLAGYL) 250 MG tablet     polyethylene glycol (MIRALAX) 17 GM/Dose powder     senna (SENOKOT) 8.6 MG tablet     sennosides (SENOKOT) 8.6 MG tablet     traZODone (DESYREL) 50 MG tablet     varenicline (CHANTIX) 1 MG tablet     bisacodyl (DULCOLAX) 5 MG EC tablet     LORazepam (ATIVAN) 0.5 MG tablet     polyethylene glycol (GOLYTELY) 236 g suspension     No current facility-administered medications for this visit.       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  While her risk of the aneurysm incidence is higher than the general population, her risk of rupture is comparable to that of other patients with incidentally noted brain aneurysms.  5-year rupture risk of the lesion of this size, in this location is less than 1%.  There is no contraindication or need for delay of kidney transplant from a neurovascular standpoint.    Plan:  Repeat MRA in 6 months    Patient seen and discussed with the attending, Dr. Hayes.    Janay Richards MD  Endovascular Surgical Neuroradiology Fellow, PGY-6  [phone]

## 2023-05-23 ENCOUNTER — TEAM CONFERENCE (OUTPATIENT)
Dept: TRANSPLANT | Facility: CLINIC | Age: 53
End: 2023-05-23
Payer: MEDICARE

## 2023-05-23 NOTE — TELEPHONE ENCOUNTER
Image Review Meeting    ATTENDEES: Dr. Osorio and coordinators     IMAGES REVIEWED: 2/10/23 iliac US     DECISION: Vessels suitable for transplant

## 2023-08-07 ENCOUNTER — PATIENT OUTREACH (OUTPATIENT)
Dept: NEPHROLOGY | Facility: CLINIC | Age: 53
End: 2023-08-07
Payer: COMMERCIAL

## 2023-08-18 ENCOUNTER — TELEPHONE (OUTPATIENT)
Dept: TRANSPLANT | Facility: CLINIC | Age: 53
End: 2023-08-18
Payer: COMMERCIAL

## 2023-08-18 NOTE — TELEPHONE ENCOUNTER
Called pt to update writer received notification of insurance clearance from . Okay for pt to reschedule stress test. Requesting update on dental and vaccines as well. Left  with direct line for return call.

## 2023-08-22 ENCOUNTER — DOCUMENTATION ONLY (OUTPATIENT)
Dept: TRANSPLANT | Facility: CLINIC | Age: 53
End: 2023-08-22

## 2023-08-22 ENCOUNTER — TELEPHONE (OUTPATIENT)
Dept: TRANSPLANT | Facility: CLINIC | Age: 53
End: 2023-08-22
Payer: COMMERCIAL

## 2023-08-22 NOTE — TELEPHONE ENCOUNTER
Received a call from Liza TAFOYA, looking for an update on pts transplant status. Reviewed pt has stress test scheduled next month. Needs dental and vaccine records sent over. She will reach out to pt to provide more dental options.

## 2023-09-15 ENCOUNTER — ANCILLARY PROCEDURE (OUTPATIENT)
Dept: CARDIOLOGY | Facility: CLINIC | Age: 53
End: 2023-09-15
Attending: INTERNAL MEDICINE
Payer: MEDICARE

## 2023-09-15 DIAGNOSIS — N18.6 ESRD (END STAGE RENAL DISEASE) ON DIALYSIS (H): ICD-10-CM

## 2023-09-15 DIAGNOSIS — Z01.818 PRE-TRANSPLANT EVALUATION FOR KIDNEY TRANSPLANT: ICD-10-CM

## 2023-09-15 DIAGNOSIS — Z76.82 ORGAN TRANSPLANT CANDIDATE: ICD-10-CM

## 2023-09-15 DIAGNOSIS — Z99.2 ESRD (END STAGE RENAL DISEASE) ON DIALYSIS (H): ICD-10-CM

## 2023-09-15 PROCEDURE — 93321 DOPPLER ECHO F-UP/LMTD STD: CPT | Performed by: INTERNAL MEDICINE

## 2023-09-15 PROCEDURE — 93016 CV STRESS TEST SUPVJ ONLY: CPT | Performed by: INTERNAL MEDICINE

## 2023-09-15 PROCEDURE — 93325 DOPPLER ECHO COLOR FLOW MAPG: CPT | Performed by: INTERNAL MEDICINE

## 2023-09-15 PROCEDURE — 93350 STRESS TTE ONLY: CPT | Performed by: INTERNAL MEDICINE

## 2023-09-15 PROCEDURE — 93018 CV STRESS TEST I&R ONLY: CPT | Performed by: INTERNAL MEDICINE

## 2023-09-15 PROCEDURE — 93017 CV STRESS TEST TRACING ONLY: CPT | Performed by: INTERNAL MEDICINE

## 2023-10-10 ENCOUNTER — TELEPHONE (OUTPATIENT)
Dept: NEUROSURGERY | Facility: CLINIC | Age: 53
End: 2023-10-10
Payer: MEDICARE

## 2023-10-27 ENCOUNTER — TELEPHONE (OUTPATIENT)
Dept: NEUROSURGERY | Facility: CLINIC | Age: 53
End: 2023-10-27

## 2023-11-07 DIAGNOSIS — I12.9 RENAL HYPERTENSION: ICD-10-CM

## 2023-11-07 RX ORDER — AMLODIPINE BESYLATE 10 MG/1
10 TABLET ORAL DAILY
Qty: 90 TABLET | Refills: 3 | Status: SHIPPED | OUTPATIENT
Start: 2023-11-07

## 2023-11-07 NOTE — TELEPHONE ENCOUNTER
Medication: amLODIPine (NORVASC) 10 MG tablet   Sig: Take 1 tablet (10 mg) by mouth daily   Date last Filled: 9/05/23    [facility]ed [facility]: [facility] [phone]    Pt's last office visit: 12/28/22  Next scheduled office visit: none

## 2023-11-15 ENCOUNTER — TELEPHONE (OUTPATIENT)
Dept: NEUROLOGY | Facility: CLINIC | Age: 53
End: 2023-11-15
Payer: MEDICARE

## 2023-11-15 NOTE — TELEPHONE ENCOUNTER
Called patient to talk through MRA and appt with Dr. Hayes. [facility] for patient letting her know I was calling to check in and would send a [employer] message. Left my contact information and encouraged her to call back.   Angelica Hayes RN 11/15/2023 9:31 AM

## 2024-01-04 ENCOUNTER — MYC MEDICAL ADVICE (OUTPATIENT)
Dept: NEUROSURGERY | Facility: CLINIC | Age: 54
End: 2024-01-04
Payer: MEDICARE

## 2024-01-04 DIAGNOSIS — I67.1 BRAIN ANEURYSM: Primary | ICD-10-CM

## 2024-01-05 NOTE — TELEPHONE ENCOUNTER
Order entered per verbal order from Dr. Hayes. Called prescription into patient preferred pharmacy.   Angelica Hayes RN 1/5/2024 9:34 AM    Prescription called in. Patient notified.   Angelica Hayes RN 1/5/2024 9:46 AM

## 2024-01-07 RX ORDER — DIAZEPAM 2 MG
TABLET ORAL
Qty: 2 TABLET | Refills: 0 | Status: SHIPPED | OUTPATIENT
Start: 2024-01-07 | End: 2024-02-27

## 2024-01-25 ENCOUNTER — ANCILLARY PROCEDURE (OUTPATIENT)
Dept: MRI IMAGING | Facility: CLINIC | Age: 54
End: 2024-01-25
Attending: RADIOLOGY
Payer: MEDICARE

## 2024-01-25 PROCEDURE — 70544 MR ANGIOGRAPHY HEAD W/O DYE: CPT | Mod: MG | Performed by: RADIOLOGY

## 2024-01-25 PROCEDURE — G1010 CDSM STANSON: HCPCS | Performed by: RADIOLOGY

## 2024-01-30 ENCOUNTER — VIRTUAL VISIT (OUTPATIENT)
Dept: NEUROSURGERY | Facility: CLINIC | Age: 54
End: 2024-01-30
Payer: MEDICARE

## 2024-01-30 ENCOUNTER — TELEPHONE (OUTPATIENT)
Dept: NEUROLOGY | Facility: CLINIC | Age: 54
End: 2024-01-30

## 2024-01-30 DIAGNOSIS — I67.1 BRAIN ANEURYSM: Primary | ICD-10-CM

## 2024-01-30 PROCEDURE — 99214 OFFICE O/P EST MOD 30 MIN: CPT | Mod: 95 | Performed by: RADIOLOGY

## 2024-01-30 NOTE — PROGRESS NOTES
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  She underwent MRA screening for aneurysms due to her PKD in 2005, and this lesion was not present at that time.  Again, imaging was obtained for aneurysm screening, in the context of preparation for kidney transplant.  Her blood pressure is well controlled.  She has quit smoking on a number of occasions and most recently restarted 2 weeks ago, but she is starting Chantix and intends to quit shortly.  She has no personal or family history of other brain aneurysm, or subarachnoid hemorrhage.    She is from [place], [place] and is [facility], she is on dialysis for ESRD - on PD everyday.   She hasn't had the kidney transplant yet she isn't sure when it was going to get down, she has to undergo dental in order to proceed, she has some broken teeth. She is inactive on the transplant list.   She quit smoking in September. SBPs 120-130/84      Past Medical History:  Past Medical History:   Diagnosis Date    Anxiety     Chronic kidney disease     Contraception 03/19/2009    Depression     Former tobacco use     Hypertension     Liver disease     PKD (polycystic kidney disease)     Polycystic kidney, unspecified type     PONV (postoperative nausea and vomiting)     Thyroid disease     Varicosities        Past Surgical History:  Past Surgical History:   Procedure Laterality Date    COLONOSCOPY N/A 4/7/2023    Procedure: Colonoscopy;  Surgeon: Lizzie Munoz MD;   Location: [facility]    LAPAROSCOPIC DECORTICATION CYST RENAL  2006    LAPAROSCOPIC INSERTION CATHETER PERITONEAL DIALYSIS Right 2023    Procedure: INSERTION, CATHETER, DIALYSIS, PERITONEAL, LAPAROSCOPIC;  Surgeon: Juanita Hagen MD;  Location: [facility] OR    MYRINGOTOMY, INSERT TUBE(S), ADENOIDECTOMY, COMBINED      PE TUBES  age 13    SURGICAL HISTORY OF -   2005    kidney surgery for cyst removal       Social History:  Social History     Socioeconomic History    Marital status:      Spouse name: Not on file    Number of children: Not on file    Years of education: Not on file    Highest education level: Not on file   Occupational History    Not on file   Tobacco Use    Smoking status: Former     Packs/day: 0.50     Years: 10.00     Additional pack years: 0.00     Total pack years: 5.00     Types: Cigarettes     Quit date: 10/17/2022     Years since quittin.2    Smokeless tobacco: Never   Vaping Use    Vaping Use: Never used   Substance and Sexual Activity    Alcohol use: Yes     Comment: Very occasionally    Drug use: Yes     Types: Marijuana     Comment: Smokes once daily to induce appetite.    Sexual activity: Yes     Partners: Male     Birth control/protection: None   Other Topics Concern    Parent/sibling w/ CABG, MI or angioplasty before 65F 55M? No   Social History Narrative    ** Merged History Encounter **         Dairy/d 2 servings/d.     Caffeine 3 servings/d    Exercise 5-7 x week walking    Sunscreen used - Yes    Seatbelts used - Yes    Working smoke/CO detectors in the home - NO    Guns stored in the home - No    Self Breast Exams - Yes    Self Testicular Exam - NA    Eye Exam up to date - Yes    Dental Exam up to date - No    Pap Smear up to date - Yes    Mammogram up to date - No    PSA up to date - NA    Dexa Scan up to date - NA    Flex Sig / Colonoscopy up to date - NA    Immunizations up to date - No    Abuse: Current or Past(Physical, Sexual or Emotional)- No    Do  you feel safe in your environment - Yes    HONG HALLAnselmo BRIAN.    [birth year]06         Social Determinants of Health     Financial Resource Strain: Not on file   Food Insecurity: Not on file   Transportation Needs: Not on file   Physical Activity: Not on file   Stress: Not on file   Social Connections: Not on file   Interpersonal Safety: Not on file   Housing Stability: Not on file       Family History:  Family History   Problem Relation Age of Onset    Lung Cancer Mother         lung    Hypertension Father     Breast Cancer Maternal Aunt     Breast Cancer Cousin     Breast Cancer Cousin     Cerebrovascular Disease Paternal Aunt 70    Diabetes No family hx of     C.A.D. No family hx of     Cancer - colorectal No family hx of     Prostate Cancer No family hx of        Home Medications:  Current Outpatient Medications   Medication    amLODIPine (NORVASC) 10 MG tablet    bisacodyl (DULCOLAX) 5 MG EC tablet    carvedilol (COREG) 6.25 MG tablet    cephALEXin (KEFLEX) 250 MG capsule    ciprofloxacin (CIPRO) 500 MG tablet    cyclobenzaprine (FLEXERIL) 10 MG tablet    diazepam (VALIUM) 2 MG tablet    diazepam (VALIUM) 2 MG tablet    diazepam (VALIUM) 2 MG tablet    diphenhydrAMINE-acetaminophen (TYLENOL PM)  MG tablet    docusate sodium (COLACE) 100 MG capsule    gabapentin (NEURONTIN) 100 MG capsule    LORazepam (ATIVAN) 0.5 MG tablet    medical cannabis (Patient's own supply)    metroNIDAZOLE (FLAGYL) 250 MG tablet    polyethylene glycol (GOLYTELY) 236 g suspension    polyethylene glycol (MIRALAX) 17 GM/Dose powder    senna (SENOKOT) 8.6 MG tablet    sennosides (SENOKOT) 8.6 MG tablet    traZODone (DESYREL) 50 MG tablet    varenicline (CHANTIX) 1 MG tablet     No current facility-administered medications for this visit.     Facility-Administered Medications Ordered in Other Visits   Medication    perflutren diluted in saline (DEFINITY) injection 5 mL       Allergies:  Allergies   Allergen Reactions    Bactrim  [Sulfamethoxazole W/Trimethoprim] Itching    Oxycodone-Acetaminophen Nausea and Vomiting    Seasonal Allergies     Pcn [Penicillin G]        Physical Examination:  Video visit  Awake, alert, attentive, fully oriented, language is fluent and coherent  Visual fields full, PERRL, EOMI, face symmetric, speech is nondysarthric  Moves 4 of 4 extremities antigravity without drift    Laboratory findings:  Reviewed    Imaging findings:  MRA brain reviewed- 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  While her risk of the aneurysm incidence is higher than the general population, her risk of rupture is comparable to that of other patients with incidentally noted brain aneurysms.  5-year rupture risk of the lesion of this size, in this location is less than 1%.  On most recent imaging her aneurysm ahs grown in size from 5mm to 5.5mm, given the increase in size and her young age would recommend intervention. She needs a diagnostic angiogram to further characterize the lesion and help determine which treatment approach is better. Discussed the risk and benefits of the diagnostic cerebral angiogram procedure which included the risk of groin hematoma, bruising, bleeding, limb ischemia, stroke, arteriovenous malformation or fistula formation. The procedure was detailed to the patient with a description of the procedure, conscious sedation and closure device.The patient's questions were answered and all concerns were addressed. The patient agreed to proceed with the procedure. Discussed the natural course of aneurysms the mortality and morbidity with aneurysm rupture. The size of the aneurysm is approximately 5.5 given this size and location of the aneurysm this carries a 1% rupture risk over a 5 year time period. Discussed the risk factors for aneurysm rupture and growth including smoking, hypertension  that is uncontrolled and family history.     Discussed the 3 treatment strategies with aneurysms that includes observation with serial imaging studies, surgical approach for aneurysm treatment and endovascular approach. Discussed the rupture risk of the aneurysm and the resulting morbidity and mortality of subarachnoid hemorrhage due to aneurysmal rupture.     Discussed with the patient the various treatment approaches for aneurysms that includes observation over time with serial imaging, surgical approach or endovascular approach. Discussed the risk associated with endovascular treatment of aneurysms which includes stroke 2-3%,aneurysm rupture, bleeding, bruising, groin or wrist hematoma, arteriovenous malformation or fistula development, retroperitoneal hemorrhage, artery perforation or dissection.   Discussed the risks associated with surgical approach which includes a longer recovery time with cognitive deficits, aneurysm rupture, bleeding, infection, artery perforation.       She is very anxious about her aneurysm and wants to proceed with treatment and diagnostic treatment at the same time.       Plan:  DSA with intention to treat 1 month  Start aspirin, Brilinta 5 days prior to procedure     Patient seen and discussed with the attending, Dr. Hayes.  Brenda Haskins MD  Endovascular Surgical Neuroradiology Fellow  [facility]  [phone]    Endovascular Surgical Neuroradiology staff is Dr. Hayes

## 2024-01-30 NOTE — LETTER
1/30/2024       RE: Sheri Joyce  [address]  [facility] [address]     Dear Colleague,    Thank you for referring your patient, Sheri Joyce, to the [facility] [place] at [facility]. Please see a copy of my visit note below.                                                                         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  She underwent MRA screening for aneurysms due to her PKD in 2005, and this lesion was not present at that time.  Again, imaging was obtained for aneurysm screening, in the context of preparation for kidney transplant.  Her blood pressure is well controlled.  She has quit smoking on a number of occasions and most recently restarted 2 weeks ago, but she is starting Chantix and intends to quit shortly.  She has no personal or family history of other brain aneurysm, or subarachnoid hemorrhage.    She is from [place], [place] and is [facility], she is on dialysis for ESRD - on PD everyday.   She hasn't had the kidney transplant yet she isn't sure when it was going to get down, she has to undergo dental in order to proceed, she has some broken teeth. She is inactive on the transplant list.   She quit smoking in September. SBPs 120-130/84      Past Medical History:  Past Medical History:   Diagnosis Date    Anxiety     Chronic kidney disease     Contraception 03/19/2009    Depression     Former tobacco use     Hypertension     Liver disease      PKD (polycystic kidney disease)     Polycystic kidney, unspecified type     PONV (postoperative nausea and vomiting)     Thyroid disease     Varicosities        Past Surgical History:  Past Surgical History:   Procedure Laterality Date    COLONOSCOPY N/A 2023    Procedure: Colonoscopy;  Surgeon: Lizzie Munoz MD;  Location: [facility]    LAPAROSCOPIC DECORTICATION CYST RENAL  2006    LAPAROSCOPIC INSERTION CATHETER PERITONEAL DIALYSIS Right 2023    Procedure: INSERTION, CATHETER, DIALYSIS, PERITONEAL, LAPAROSCOPIC;  Surgeon: Juanita Hagen MD;  Location: [facility] OR    MYRINGOTOMY, INSERT TUBE(S), ADENOIDECTOMY, COMBINED      PE TUBES  age 13    SURGICAL HISTORY OF -   2005    kidney surgery for cyst removal       Social History:  Social History     Socioeconomic History    Marital status:      Spouse name: Not on file    Number of children: Not on file    Years of education: Not on file    Highest education level: Not on file   Occupational History    Not on file   Tobacco Use    Smoking status: Former     Packs/day: 0.50     Years: 10.00     Additional pack years: 0.00     Total pack years: 5.00     Types: Cigarettes     Quit date: 10/17/2022     Years since quittin.2    Smokeless tobacco: Never   Vaping Use    Vaping Use: Never used   Substance and Sexual Activity    Alcohol use: Yes     Comment: Very occasionally    Drug use: Yes     Types: Marijuana     Comment: Smokes once daily to induce appetite.    Sexual activity: Yes     Partners: Male     Birth control/protection: None   Other Topics Concern    Parent/sibling w/ CABG, MI or angioplasty before 65F 55M? No   Social History Narrative    ** Merged History Encounter **         Dairy/d 2 servings/d.     Caffeine 3 servings/d    Exercise 5-7 x week walking    Sunscreen used - Yes    Seatbelts used - Yes    Working smoke/CO detectors in the home - NO    Guns stored in the home - No    Self Breast Exams - Yes    Self  Testicular Exam - NA    Eye Exam up to date - Yes    Dental Exam up to date - No    Pap Smear up to date - Yes    Mammogram up to date - No    PSA up to date - NA    Dexa Scan up to date - NA    Flex Sig / Colonoscopy up to date - NA    Immunizations up to date - No    Abuse: Current or Past(Physical, Sexual or Emotional)- No    Do you feel safe in your environment - Yes    HONG HALLAnselmo BRIAN.    06/02/06         Social Determinants of Health     Financial Resource Strain: Not on file   Food Insecurity: Not on file   Transportation Needs: Not on file   Physical Activity: Not on file   Stress: Not on file   Social Connections: Not on file   Interpersonal Safety: Not on file   Housing Stability: Not on file       Family History:  Family History   Problem Relation Age of Onset    Lung Cancer Mother         lung    Hypertension Father     Breast Cancer Maternal Aunt     Breast Cancer Cousin     Breast Cancer Cousin     Cerebrovascular Disease Paternal Aunt 70    Diabetes No family hx of     C.A.D. No family hx of     Cancer - colorectal No family hx of     Prostate Cancer No family hx of        Home Medications:  Current Outpatient Medications   Medication    amLODIPine (NORVASC) 10 MG tablet    bisacodyl (DULCOLAX) 5 MG EC tablet    carvedilol (COREG) 6.25 MG tablet    cephALEXin (KEFLEX) 250 MG capsule    ciprofloxacin (CIPRO) 500 MG tablet    cyclobenzaprine (FLEXERIL) 10 MG tablet    diazepam (VALIUM) 2 MG tablet    diazepam (VALIUM) 2 MG tablet    diazepam (VALIUM) 2 MG tablet    diphenhydrAMINE-acetaminophen (TYLENOL PM)  MG tablet    docusate sodium (COLACE) 100 MG capsule    gabapentin (NEURONTIN) 100 MG capsule    LORazepam (ATIVAN) 0.5 MG tablet    medical cannabis (Patient's own supply)    metroNIDAZOLE (FLAGYL) 250 MG tablet    polyethylene glycol (GOLYTELY) 236 g suspension    polyethylene glycol (MIRALAX) 17 GM/Dose powder    senna (SENOKOT) 8.6 MG tablet    sennosides (SENOKOT) 8.6 MG tablet     traZODone (DESYREL) 50 MG tablet    varenicline (CHANTIX) 1 MG tablet     No current facility-administered medications for this visit.     Facility-Administered Medications Ordered in Other Visits   Medication    perflutren diluted in saline (DEFINITY) injection 5 mL       Allergies:  Allergies   Allergen Reactions    Bactrim [Sulfamethoxazole W/Trimethoprim] Itching    Oxycodone-Acetaminophen Nausea and Vomiting    Seasonal Allergies     Pcn [Penicillin G]        Physical Examination:  Video visit  Awake, alert, attentive, fully oriented, language is fluent and coherent  Visual fields full, PERRL, EOMI, face symmetric, speech is nondysarthric  Moves 4 of 4 extremities antigravity without drift    Laboratory findings:  Reviewed    Imaging findings:  MRA brain reviewed- 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  While her risk of the aneurysm incidence is higher than the general population, her risk of rupture is comparable to that of other patients with incidentally noted brain aneurysms.  5-year rupture risk of the lesion of this size, in this location is less than 1%.  On most recent imaging her aneurysm ahs grown in size from 5mm to 5.5mm, given the increase in size and her young age would recommend intervention. She needs a diagnostic angiogram to further characterize the lesion and help determine which treatment approach is better. Discussed the risk and benefits of the diagnostic cerebral angiogram procedure which included the risk of groin hematoma, bruising, bleeding, limb ischemia, stroke, arteriovenous malformation or fistula formation. The procedure was detailed to the patient with a description of the procedure, conscious sedation and closure device.The patient's questions were answered and all concerns were addressed. The patient agreed to proceed with the procedure.  Discussed the natural course of aneurysms the mortality and morbidity with aneurysm rupture. The size of the aneurysm is approximately 5.5 given this size and location of the aneurysm this carries a 1% rupture risk over a 5 year time period. Discussed the risk factors for aneurysm rupture and growth including smoking, hypertension that is uncontrolled and family history.     Discussed the 3 treatment strategies with aneurysms that includes observation with serial imaging studies, surgical approach for aneurysm treatment and endovascular approach. Discussed the rupture risk of the aneurysm and the resulting morbidity and mortality of subarachnoid hemorrhage due to aneurysmal rupture.     Discussed with the patient the various treatment approaches for aneurysms that includes observation over time with serial imaging, surgical approach or endovascular approach. Discussed the risk associated with endovascular treatment of aneurysms which includes stroke 2-3%,aneurysm rupture, bleeding, bruising, groin or wrist hematoma, arteriovenous malformation or fistula development, retroperitoneal hemorrhage, artery perforation or dissection.   Discussed the risks associated with surgical approach which includes a longer recovery time with cognitive deficits, aneurysm rupture, bleeding, infection, artery perforation.       She is very anxious about her aneurysm and wants to proceed with treatment and diagnostic treatment at the same time.       Plan:  DSA with intention to treat 1 month  Start aspirin, Brilinta 5 days prior to procedure     Patient seen and discussed with the attending, Dr. Hayes.  Brenda Haskins MD  Endovascular Surgical Neuroradiology Fellow  [facility]  [phone]    Endovascular Surgical Neuroradiology staff is Dr. Hayes        Attestation signed by Jesus Hayes MD at 2/11/2024  3:40 PM:  I have personally seen and evaluated the patient on January 30 2024  and I agree  with the note by Dr. Haskins        On February 11, 2024      Again, thank you for allowing me to participate in the care of your patient.      Sincerely,    Jesus Hayes MD

## 2024-01-30 NOTE — PATIENT INSTRUCTIONS
We will contact you regarding your procedure.    Stroke & Endovascular RN Care Coordinators:    Angelica Hayes, RN, BSN  Renetta Nazario, RN, CNRN, SCRN    If you have any questions please contact the RN Care Coordinators at [phone], option 1.     After business hours call the  at [phone] and have the Neuro-Interventional Fellow paged.    Thank you for choosing [facility] for your health care needs.

## 2024-01-30 NOTE — TELEPHONE ENCOUNTER
LVMM informing patient will send [email or web address] message. Need to speak with patient prior to having scheduling reach out. Encouraged to return call or respond to [email or web address] message.     Addendum:  Communicated with patient via [email or web address]. Message sent to scheduling to contact patient to schedule procedure and PAC visit.     Renetta Nazario RN 1/30/2024 2:53 PM

## 2024-01-30 NOTE — TELEPHONE ENCOUNTER
----- Message -----  From: Brenda Haskins MD  Sent: 1/30/2024   9:09 AM CST  To: Stroke/Neurovascular Nurses    Hello,     This patient needs to be set up for diagnostic angiogram with intention to treat with WEB device under GA, start aspirin/ Brilinta 5 days prior. She is saving up for dental work can you ask her if she is planning on having anything done within the next 3-4 months as she may have to delay if she needs antiplatelet therapy for longer.      She also has some broken teeth so we need to make sure anesthesia would be ok with proceeding    Brenda Haskins MD  Endovascular Surgical Neuroradiology Fellow  [facility]  [phone]    Endovascular Surgical Neuroradiology staff is Dr. Hayes

## 2024-01-31 ENCOUNTER — TELEPHONE (OUTPATIENT)
Dept: RADIOLOGY | Facility: CLINIC | Age: 54
End: 2024-01-31
Payer: MEDICARE

## 2024-01-31 ENCOUNTER — PATIENT OUTREACH (OUTPATIENT)
Dept: NEUROLOGY | Facility: CLINIC | Age: 54
End: 2024-01-31
Payer: MEDICARE

## 2024-01-31 DIAGNOSIS — I67.1 CEREBRAL ANEURYSM, NONRUPTURED: Primary | ICD-10-CM

## 2024-01-31 RX ORDER — ASPIRIN 81 MG/1
TABLET ORAL
Qty: 30 TABLET | Refills: 3 | Status: ON HOLD | OUTPATIENT
Start: 2024-01-31 | End: 2024-03-21

## 2024-01-31 NOTE — TELEPHONE ENCOUNTER
Patient is scheduled for surgery with: Dr. Hayes    Surgery Date: 3/14     Location: [facility]    H&P: to be completed by PAC clinic - scheduled on 2/27     Post-op: not needed at this time    Patient is aware of arrival and procedure times.       Patient questions/concerns: N/A     Surgery packet N/A       Lissa Machado on 1/31/2024 at 10:24 AM

## 2024-01-31 NOTE — PATIENT INSTRUCTIONS
You are scheduled for a cerebral angiogram with  intention to treat , under general anesthesia, with Dr. Hayes on 3/14/24. Arrival Time: 7:00 am. Procedure Time: 9:00 am.    Please follow these instructions:    * You will need a pre-op physical within 30 days prior to your procedure. You are scheduled for 2/27/24 at 10:45 am (10:30 am arrival) with the Pre-Operative Assessment Center (PAC), located at [facility] at [address] [place] [address]. The PAC phone number is [phone].    * Begin taking 81 mg aspirin and Brilinta 60 mg twice daily on 3/9/24. You will remain on these medications for your procedure.     * Check in at the Surgery Admission Unit (3C), on the third floor, at the [facility]. The address is [address] [place] [address]. The phone number is [phone].     * Nothing to eat for 8 hours prior to arrival time. You may drink clear liquids (includes water, Jell-O, clear broth, apple juice or any non-carbonated beverage that you can see through) up until 2 hours prior to arrival time.     * You may take your medications, including aspirin and Brilinta, with a sip of water the morning of the procedure.     * If you are not treated, you will be discharged the same day. You must have a  home and someone that can stay with you through the night. If you are treated you will stay overnight at the hospital for observation after the procedure. We aim to discharge you by 11:00 AM the following day. Please have your ride available by 10:00 AM.     PLEASE NOTE our COVID-19 visitors policy: For the protection of our patients and visitors, patients are allowed two consistent visitors, 5 years of age or older, per adult patient in the hospital.     All discharge instructions will be given to the  or volunteer. Documentation for the post-operative plan will be given to the patient and . Patients are required  to have someone to stay with them for 24 hours after their procedure.    If you have questions regarding your procedure, please contact me at [phone], option 1.    If you need to cancel, reschedule or have procedure scheduling related questions, please call Neuroendovascular IR Procedure Scheduling at [phone].    Thank you,  Renetta Nazario, RN, CNRN, SCRN  Angelica Hayes, RN, BSN  Stroke & Neuroendovascular Care Coordinator

## 2024-01-31 NOTE — PROGRESS NOTES
Informed patient of procedure instructions. Confirmed date and time. All questions answered. Patient instructions sent via Non[employer].     Prescriptions sent - confirmed that patient is not lactating.     Patient has my contact information and was encouraged to call with questions/concerns.      Renetta Nazario RN 1/31/2024 3:14 PM

## 2024-02-01 NOTE — TELEPHONE ENCOUNTER
FUTURE VISIT INFORMATION      SURGERY INFORMATION:  Date: 3/14/24  [facility]  Surgeon:  Jesus Hayes MD   Anesthesia Type:  general  Procedure: ANESTHESIA, IN NON-OPERATING ROOM SETTING Angiogram with Intervention @0900   Consult: virtual visit 1/30/24    RECORDS REQUESTED FROM:       Primary Care Provider: Tianna Vieyra APRN CNP     Pertinent Medical History: hypertension    Most recent EKG+ Tracing: 3/9/23    Most recent ECHO: 9/15/23    Most recent PFT's: 12/27/22

## 2024-02-21 ENCOUNTER — TELEPHONE (OUTPATIENT)
Dept: SURGERY | Facility: CLINIC | Age: 54
End: 2024-02-21
Payer: MEDICARE

## 2024-02-21 NOTE — PROGRESS NOTES
Message sent to AMA informing her that there is a plan put in place for Brilinta.  See note dated 1/30/24 by Dr. Hayes.    Felipa Connor RN

## 2024-02-26 LAB
ABO/RH(D): NORMAL
ANTIBODY SCREEN: NEGATIVE
SPECIMEN EXPIRATION DATE: NORMAL

## 2024-02-27 ENCOUNTER — LAB (OUTPATIENT)
Dept: LAB | Facility: CLINIC | Age: 54
End: 2024-02-27
Payer: MEDICARE

## 2024-02-27 ENCOUNTER — ANESTHESIA EVENT (OUTPATIENT)
Dept: SURGERY | Facility: CLINIC | Age: 54
DRG: 025 | End: 2024-02-27
Payer: MEDICARE

## 2024-02-27 ENCOUNTER — OFFICE VISIT (OUTPATIENT)
Dept: SURGERY | Facility: CLINIC | Age: 54
End: 2024-02-27
Payer: MEDICARE

## 2024-02-27 ENCOUNTER — PRE VISIT (OUTPATIENT)
Dept: SURGERY | Facility: CLINIC | Age: 54
End: 2024-02-27

## 2024-02-27 VITALS
OXYGEN SATURATION: 99 % | HEART RATE: 63 BPM | BODY MASS INDEX: 27.14 KG/M2 | TEMPERATURE: 98 F | HEIGHT: 64 IN | WEIGHT: 159 LBS | DIASTOLIC BLOOD PRESSURE: 91 MMHG | SYSTOLIC BLOOD PRESSURE: 141 MMHG

## 2024-02-27 DIAGNOSIS — Z01.818 PRE-OP EVALUATION: Primary | ICD-10-CM

## 2024-02-27 DIAGNOSIS — I67.1 BRAIN ANEURYSM: ICD-10-CM

## 2024-02-27 DIAGNOSIS — Z01.818 PRE-OP EVALUATION: ICD-10-CM

## 2024-02-27 LAB
ANION GAP SERPL CALCULATED.3IONS-SCNC: 13 MMOL/L (ref 7–15)
BUN SERPL-MCNC: 45.2 MG/DL (ref 6–20)
CALCIUM SERPL-MCNC: 9.4 MG/DL (ref 8.6–10)
CHLORIDE SERPL-SCNC: 100 MMOL/L (ref 98–107)
CREAT SERPL-MCNC: 5.91 MG/DL (ref 0.51–0.95)
DEPRECATED HCO3 PLAS-SCNC: 25 MMOL/L (ref 22–29)
EGFRCR SERPLBLD CKD-EPI 2021: 8 ML/MIN/1.73M2
ERYTHROCYTE [DISTWIDTH] IN BLOOD BY AUTOMATED COUNT: 13 % (ref 10–15)
GLUCOSE SERPL-MCNC: 110 MG/DL (ref 70–99)
HCT VFR BLD AUTO: 40.3 % (ref 35–47)
HGB BLD-MCNC: 13.7 G/DL (ref 11.7–15.7)
MCH RBC QN AUTO: 30.6 PG (ref 26.5–33)
MCHC RBC AUTO-ENTMCNC: 34 G/DL (ref 31.5–36.5)
MCV RBC AUTO: 90 FL (ref 78–100)
PLATELET # BLD AUTO: 201 10E3/UL (ref 150–450)
POTASSIUM SERPL-SCNC: 3.3 MMOL/L (ref 3.4–5.3)
RBC # BLD AUTO: 4.47 10E6/UL (ref 3.8–5.2)
SODIUM SERPL-SCNC: 138 MMOL/L (ref 135–145)
WBC # BLD AUTO: 6.8 10E3/UL (ref 4–11)

## 2024-02-27 PROCEDURE — 86900 BLOOD TYPING SEROLOGIC ABO: CPT | Performed by: PHYSICIAN ASSISTANT

## 2024-02-27 PROCEDURE — 85027 COMPLETE CBC AUTOMATED: CPT | Performed by: PATHOLOGY

## 2024-02-27 PROCEDURE — 36415 COLL VENOUS BLD VENIPUNCTURE: CPT | Performed by: PATHOLOGY

## 2024-02-27 PROCEDURE — 99204 OFFICE O/P NEW MOD 45 MIN: CPT | Performed by: PHYSICIAN ASSISTANT

## 2024-02-27 PROCEDURE — 80048 BASIC METABOLIC PNL TOTAL CA: CPT | Performed by: PATHOLOGY

## 2024-02-27 RX ORDER — SIMETHICONE 80 MG
80 TABLET,CHEWABLE ORAL DAILY PRN
COMMUNITY

## 2024-02-27 RX ORDER — ERGOCALCIFEROL (VITAMIN D2) 10 MCG
1 TABLET ORAL DAILY
COMMUNITY

## 2024-02-27 RX ORDER — FAMOTIDINE 10 MG
10 TABLET ORAL DAILY PRN
COMMUNITY

## 2024-02-27 ASSESSMENT — ENCOUNTER SYMPTOMS: SEIZURES: 0

## 2024-02-27 ASSESSMENT — PAIN SCALES - GENERAL: PAINLEVEL: MODERATE PAIN (4)

## 2024-02-27 ASSESSMENT — LIFESTYLE VARIABLES: TOBACCO_USE: 1

## 2024-02-27 NOTE — PATIENT INSTRUCTIONS
Preparing for Your Surgery      Name:  Sheri Joyce   MRN:  [id number]   :  1970   Today's Date:  2024       Arriving for surgery:  Surgery date:  3/14/24  Arrival time:  7.00AM    Please come to:     Please come to:      [facility] [address]  [address] [place]  [address]      The [facility] Patient /Visitor Ramp is located at [address]. Patients and visitors who self-park will receive the reduced hospital parking rate. If the Patient /Visitor Ramp is full, please follow the signs to the  parking located at the main hospital entrance.     parking is available ( 24 hours/ 7 days a week)    Discounted parking pass options are available for patients and visitors. They can be purchased at the [employer] desk at the main hospital entrance.    -    Stop at the security desk and they will direct surgery patients to the 3rd floor Surgery Waiting Room. [phone] 3C     -  If you are in need of directions, wheelchair or escort please stop at the Information/security desk in the lobby.       What can I eat or drink?  -  You may eat and drink normally up to 8 hours prior to arrival time. (Until 11.00PM)  -  You may have clear liquids until 2 hours prior to arrival time. (Until 5.00AM)    Examples of clear liquids:  Water  Clear broth  Juices (apple, white grape, white cranberry  and cider) without pulp  Noncarbonated, powder based beverages  (lemonade and Deny-Aid)  Sodas (Sprite, 7-Up, ginger ale and seltzer)  Coffee or tea (without milk or cream)  Gatorade    -  No Alcohol or cannabis products for at least 24 hours before surgery.     Which medicines can I take?    Hold Multivitamins for 7 days before surgery. (Vitamin D)  Hold Supplements for 7 days before surgery.  Hold Ibuprofen (Advil, Motrin) for 1 day(s) before surgery--unless otherwise directed by surgeon.  Hold Naproxen (Aleve) for 4 days before  surgery.    -  DO NOT take these medications the day of surgery:  Tylenol-PM,  Senna, Chantix.    -  PLEASE TAKE these medications the day of surgery:  Morning medications per usual except for medications listed above.  Per surgeon's recommendations, start taking Aspirin and Brilinta 5 days before procedure.    How do I prepare myself?  - Please take 2 showers (one the night prior to surgery and one the morning of surgery) using Scrubcare or Hibiclens soap.    Use this soap only from the neck to your toes.     Leave the soap on your skin for one minute--then rinse thoroughly.      You may use your own shampoo and conditioner. No other hair products.   - Please remove all jewelry and body piercings.  - No lotions, deodorants or fragrance.  - No makeup or fingernail polish.   - Bring your ID and insurance card.    -If you use a CPAP machine, please bring the CPAP machine, tubing, and mask to hospital.    -If you have a Deep Brain Stimulator, Spinal Cord Stimulator, or any Neuro Stimulator device---you must bring the remote control to the hospital.      ALL PATIENTS GOING HOME THE SAME DAY OF SURGERY ARE REQUIRED TO HAVE A RESPONSIBLE ADULT TO DRIVE AND BE IN ATTENDANCE WITH THEM FOR 24 HOURS FOLLOWING SURGERY.    Covid testing policy as of 12/06/2022  Your surgeon will notify and schedule you for a COVID test if one is needed before surgery--please direct any questions or COVID symptoms to your surgeon      Questions or Concerns:    - For any questions regarding the day of surgery or your hospital stay, please contact the Pre Admission Nursing Office at [phone].       - If you have health changes between today and your surgery, please call your surgeon.       - For questions after surgery, please call your surgeons office.           Current Visitor Guidelines    You may have 2 visitors in the pre op area.    Visiting hours: 8 a.m. to 8:30 p.m.    Patients confirmed or suspected to have symptoms of COVID 19 or  flu:     No visitors allowed for adult patients.   Children (under age 18) can have 1 named visitor.     People who are sick or showing symptoms of COVID 19 or flu:    Are not allowed to visit patients--we can only make exceptions in special situations.       Please follow these guidelines for your visit:          Please maintain social distance          Masking is optional--however at times you may be asked to wear a mask for the safety of yourself and others     Clean your hands with alcohol hand . Do this when you arrive at and leave the building and patient room,    And again after you touch your mask or anything in the room.     Go directly to and from the room you are visiting.     Stay in the patient s room during your visit. Limit going to other places in the hospital as much as possible     Leave bags and jackets at home or in the car.     For everyone s health, please don t come and go during your visit. That includes for smoking   during your visit.

## 2024-02-27 NOTE — H&P
Pre-Operative H & P     CC:  Preoperative exam to assess for increased cardiopulmonary risk while undergoing surgery and anesthesia.    Date of Encounter: 2/27/2024  Primary Care Physician:  Tianna Vieyra     Reason for visit:   Encounter Diagnoses   Name Primary?    Pre-op evaluation Yes    Brain aneurysm        HPI  Sheri Joyce is a 53 year old female who presents for pre-operative H & P in preparation for  Procedure Information       Case: [id number] Date/Time: 03/14/24 0900    Procedure: ANESTHESIA, IN NON-OPERATING ROOM SETTING Angiogram with Intervention @0900 (Update)    Anesthesia type: General    Diagnosis: Brain aneurysm [I67.1]    Pre-op diagnosis: Brain aneurysm [I67.1]    [facility]    Providers: GENERIC ANESTHESIA PROVIDER            Patient is being evaluated for comorbid conditions of HTN, tobacco use, anemia, ESRD secondary to polycystic kidney disease on peritoneal dialysis, anxiety, and depression.    She underwent MRA screening for aneurysms due to PKD in the setting of preparing for possible future kidney transplant. She was found to have a right MCA trifurcation saccular aneurysm, measuring 5 mm. She was subsequently referred to neurosurgery and completed consultation with Dr. Hayes on 1/30/24. The above procedure has been recommended for further management.     History is obtained from the patient and chart review    Hx of abnormal bleeding or anti-platelet use: Aspirin, Brilinta    Menstrual history: No LMP recorded (lmp unknown). Patient is postmenopausal.     Past Medical History  Past Medical History:   Diagnosis Date    Anxiety     Brain aneurysm     Chronic kidney disease     Contraception 03/19/2009    Depression     Former tobacco use     Hypertension     Liver disease     PKD (polycystic kidney disease)     PONV (postoperative nausea and vomiting)     Thyroid disease     Varicosities        Past Surgical History  Past Surgical History:   Procedure  Laterality Date    COLONOSCOPY N/A 4/7/2023    Procedure: Colonoscopy;  Surgeon: Lizzie Munoz MD;  [facility]    LAPAROSCOPIC DECORTICATION CYST RENAL  01/01/2006    LAPAROSCOPIC INSERTION CATHETER PERITONEAL DIALYSIS Right 1/11/2023    Procedure: INSERTION, CATHETER, DIALYSIS, PERITONEAL, LAPAROSCOPIC;  Surgeon: Juanita Hagen MD;  [facility]    MYRINGOTOMY, INSERT TUBE(S), ADENOIDECTOMY, COMBINED      PE TUBES  age 13    SURGICAL HISTORY OF -   11/01/2005    kidney surgery for cyst removal       Prior to Admission Medications  Current Outpatient Medications   Medication Sig Dispense Refill    amLODIPine (NORVASC) 10 MG tablet Take 1 tablet (10 mg) by mouth daily (Patient taking differently: Take 10 mg by mouth every morning) 90 tablet 3    diphenhydrAMINE-acetaminophen (TYLENOL PM)  MG tablet Take 1 tablet by mouth nightly as needed for sleep      famotidine (PEPCID) 40 MG tablet Take 40 mg by mouth daily      medical cannabis (Patient's own supply) 1 Dose See Admin Instructions (The purpose of this order is to document that the patient reports taking medical cannabis.  This is not a prescription, and is not used to certify that the patient has a qualifying medical condition.)    Daily smoking      polyethylene glycol (MIRALAX) 17 GM/Dose powder Take 17 g by mouth daily (Patient taking differently: Take 17 g by mouth every morning) 510 g 1    senna (SENOKOT) 8.6 MG tablet Take 1 tablet by mouth daily (Patient taking differently: Take 1 tablet by mouth at bedtime) 30 tablet 1    simethicone (MYLICON) 80 MG chewable tablet Take 80 mg by mouth daily      varenicline (CHANTIX) 1 MG tablet Take 1 tablet (1 mg) by mouth 2 times daily 60 tablet 2    Vitamin D, Cholecalciferol, 10 MCG (400 UNIT) TABS Take 1 tablet by mouth daily      aspirin 81 MG EC tablet Begin taking 1 tab daily 5 days prior to procedure (Patient taking differently: 81 mg Begin taking 1 tab daily 5 days prior to procedure)  30 tablet 3    cephALEXin (KEFLEX) 250 MG capsule Take 1 capsule (250 mg) by mouth daily (Patient taking differently: Take 250 mg by mouth as needed) 1 capsule 0    sennosides (SENOKOT) 8.6 MG tablet Take 1 tablet by mouth daily as needed for constipation      ticagrelor (BRILINTA) 60 MG tablet Begin taking 1 tab twice daily 5 days prior to procedure 60 tablet 3       Allergies  Allergies   Allergen Reactions    Bactrim [Sulfamethoxazole W/Trimethoprim] Itching    Oxycodone-Acetaminophen Nausea and Vomiting    Seasonal Allergies     Pcn [Penicillin G]        Social History  Social History     Socioeconomic History    Marital status:      Spouse name: Not on file    Number of children: Not on file    Years of education: Not on file    Highest education level: Not on file   Occupational History    Not on file   Tobacco Use    Smoking status: Some Days     Packs/day: 0.50     Years: 10.00     Additional pack years: 0.00     Total pack years: 5.00     Types: Cigarettes     Last attempt to quit: 10/17/2022     Years since quittin.3     Passive exposure: Current    Smokeless tobacco: Never   Vaping Use    Vaping Use: Never used   Substance and Sexual Activity    Alcohol use: Not Currently     Comment: Sober May 2023    Drug use: Yes     Types: Marijuana     Comment: Smokes once daily to induce appetite.    Sexual activity: Yes     Partners: Male     Birth control/protection: None   Other Topics Concern    Parent/sibling w/ CABG, MI or angioplasty before 65F 55M? No   Social History Narrative    ** Merged History Encounter **         Dairy/d 2 servings/d.     Caffeine 3 servings/d    Exercise 5-7 x week walking    Sunscreen used - Yes    Seatbelts used - Yes    Working smoke/CO detectors in the home - NO    Guns stored in the home - No    Self Breast Exams - Yes    Self Testicular Exam - NA    Eye Exam up to date - Yes    Dental Exam up to date - No    Pap Smear up to date - Yes    Mammogram up to date - No     PSA up to date - NA    Dexa Scan up to date - NA    Flex Sig / Colonoscopy up to date - NA    Immunizations up to date - No    Abuse: Current or Past(Physical, Sexual or Emotional)- No    Do you feel safe in your environment - Yes    HONG KOCH LPN.    06/02/06         Social Determinants of Health     Financial Resource Strain: Not on file   Food Insecurity: Not on file   Transportation Needs: Not on file   Physical Activity: Not on file   Stress: Not on file   Social Connections: Not on file   Interpersonal Safety: Not on file   Housing Stability: Not on file       Family History  Family History   Problem Relation Age of Onset    Lung Cancer Mother         lung    Hypertension Father     Breast Cancer Maternal Aunt     Cerebrovascular Disease Paternal Aunt 70    Breast Cancer Cousin     Breast Cancer Cousin     Diabetes No family hx of     C.A.D. No family hx of     Cancer - colorectal No family hx of     Prostate Cancer No family hx of     Anesthesia Reaction No family hx of     Venous thrombosis No family hx of        Review of Systems  The complete review of systems is negative other than noted in the HPI or here.   Anesthesia Evaluation   Pt has had prior anesthetic.     History of anesthetic complications  - PONV.      ROS/MED HX  ENT/Pulmonary:     (+)                tobacco use, Current use,                       Neurologic: Comment: Brain aneurysm   (-) no seizures, no CVA and no TIA   Cardiovascular:     (+)  hypertension- -   -  - -                                 Previous cardiac testing   Echo: Date: Results:    Stress Test:  Date: 9/15/23 Results:  Interpretation Summary     This was a normal stress echocardiogram with no evidence of stress-induced  ischemia. Normal resting LV function with EF of approximately 60-65%; normal  response to exercise with increase to approximately 70-75%. No stress induced  regional wall motion abnormalities. No ECG evidence of ischemia. No  significant valvular disease  "noted on routine screening color flow Doppler and  pulsed Doppler examination. No resting wallmotion abnormalities.  Reduced functional capacity.  Target Heart Rate was achieved.  Exercise was stopped due to fatigue.  The patient did not exhibit any symptoms during exercise.  Normal blood pressure response with stress.    ECG Reviewed:  Date: 3/9/23 Results:  Sinus bradycardia  Cath:  Date: Results:      METS/Exercise Tolerance: >4 METS Comment: Able to ascend multiple flights of stairs, walks dog daily. Denies chest pain/pressure, NEWBERRY, orthopnea, dizziness, palpitations, edema.     Hematologic:     (+)      anemia,       (-) history of blood clots and history of blood transfusion   Musculoskeletal:  - neg musculoskeletal ROS     GI/Hepatic: Comment: Constipation    (+) GERD, Asymptomatic on medication,                  Renal/Genitourinary: Comment: Polycystic kidney disease    (+) renal disease, type: ESRD, Pt requires dialysis, type: Peritoneal dialysis,          Endo:  - neg endo ROS  (-) Type II DM   Psychiatric/Substance Use:     (+) psychiatric history anxiety and depression   Recreational drug usage: Cannabis.    Infectious Disease:  - neg infectious disease ROS     Malignancy:  - neg malignancy ROS     Other:            BP (!) 141/91 (BP Location: Right arm, Patient Position: Sitting, Cuff Size: Adult Regular)   Pulse 63   Temp 98  F (36.7  C) (Oral)   Ht 1.626 m (5' 4\")   Wt 72.1 kg (159 lb)   LMP  (LMP Unknown)   SpO2 99%   BMI 27.29 kg/m      Physical Exam   Constitutional: Pleasant, well-appearing female, no apparent distress, and appears older than stated age.  Eyes: Pupils equal, round and reactive to light, extra ocular muscles intact, sclera clear, conjunctiva normal.  HENT: Normocephalic and atraumatic, oral pharynx with moist mucus membranes, poor dentition. No goiter appreciated.   Respiratory: Clear to auscultation bilaterally, no crackles or wheezing.  Cardiovascular: Regular rate and " rhythm, normal S1 and S2, and no murmur noted.  No edema. Palpable pulses to radial  DP and PT arteries.   GI: Normal bowel sounds, soft, non-distended, non-tender, PD catheter present right mid-abdomen.   Lymph/Hematologic: No cervical lymphadenopathy and no supraclavicular lymphadenopathy.  Genitourinary:  Deferred  Skin: Warm and dry.  No rashes on exposed skin   Musculoskeletal: Full ROM of neck. There is no redness, warmth, or swelling of the visible joints. Gross motor strength is normal.    Neurologic: Awake, alert, oriented to name, place and time. Cranial nerves II-XII are grossly intact. Gait is normal.   Neuropsychiatric: Calm, cooperative. Normal affect.       Prior Labs/Diagnostic Studies   All labs and imaging personally reviewed     EKG/ stress test - if available please see in ROS above   Echo result w/o MOPS: Interpretation SummaryGlobal and regional left ventricular function is normal with an EF of 55-60%.Global right ventricular function is normal.No significant valvular abnormalities present.IVC diameter <2.1 cm collapsing >50% with sniff suggests a normal RA pressureof 3 mmHg.No pericardial effusion is present.There is no prior study for direct comparison.          Latest Ref Rng & Units 12/27/2022     9:48 AM   PFT   FVC L 2.97    FEV1 L 2.11    FVC% % 95    FEV1% % 84          The patient's records and results personally reviewed by this provider.     Outside records reviewed from: Care Everywhere    LAB/DIAGNOSTIC STUDIES TODAY:  CBC, BMP, T&S    Assessment  Sheri Joyce is a 53 year old female seen as a PAC referral for risk assessment and optimization for anesthesia.    Plan/Recommendations  Pt will be optimized for the proposed procedure.  See below for details on the assessment, risk, and preoperative recommendations    NEUROLOGY  - No history of TIA, CVA or seizure  - Right MCA aneurysm  Found on screening MRA. Above procedure planned for further management.    -Post Op delirium  "risk factors:  No risk identified    ENT  - No current airway concerns.  Will need to be reassessed day of surgery.  Mallampati: I  TM: > 3  - Poor dentition    CARDIAC  - No history of CAD and Afib  - Hypertension  Well controlled on amlodipine. Patient states BP at home averages 120-130/80s in the morning.   - Patient denies other cardiac history or cardiac symptoms.    - METS (Metabolic Equivalents)  Patient performs 4 or more METS exercise without symptoms            Total Score: 0      RCRI-Low risk: Class 2 0.9% complication rate            Total Score: 1    RCRI: Elevated Creatinine        PULMONARY    JORDAN Low Risk            Total Score: 2    JORDAN: Hypertension    JORDAN: Over 50 ys old      - Denies asthma or inhaler use  - Tobacco History    History   Smoking Status    Some Days    Packs/day: 0.50    Years: 10.00    Types: Cigarettes    Last attempt to quit: 10/17/2022   Smokeless Tobacco    Never   Patient is trying to quit, using Chantix. Reports being down to about 1 cigarette per day. Hold Chantix DOS.     GI  - GERD  Controlled on medications: Pepcid  - Chronic constipation  Hold Senna DOS    PONV Medium Risk  Total Score: 2           1 AN PONV: Pt is Female    1 AN PONV: Patient has history of PONV        /RENAL  - ESRD on PD secondary to PKD  Patient does PD nightly and twice monthly labs through [facility]. Unable to review outside labs today. W update BMP today.     ENDOCRINE    - BMI: Estimated body mass index is 27.29 kg/m  as calculated from the following:    Height as of this encounter: 1.626 m (5' 4\").    Weight as of this encounter: 72.1 kg (159 lb).  Overweight (BMI 25.0-29.9)  - No history of Diabetes Mellitus    HEME  VTE Low Risk 0.26%            Total Score: 0      - Platelet disfunction secondary to Aspirin (Suly, many others) and Ticegralor (Brillinta). Patient will start these medications on 3/9/24 and continue through surgery per neurosurgery recommendations.  - Chronic anemia, secondary " to ESRD  Patient reports baseline hgb 11-13  She does receive iron infusions approximately every 2 weeks  Will update CBC today  Recommend perioperative use of blood conservation techniques intraoperatively and close monitoring for postoperative bleeding.  A type and screen has been ordered for this patient    PSYCH/SUBSTANCE  - Anxiety,Depression  Not currently on medications. Patient reports that her anxiety levels have been high due to upcoming surgery and multiple social stressors  - Patient reports daily marijuana use. She was advised to abstain from marijuana products for 24 hours prior to surgery. She expressed understanding.     Different anesthesia methods/types have been discussed with the patient, but they are aware that the final plan will be decided by the assigned anesthesia provider on the date of service.  Patient was seen with Aaron, anesthesia resident. He discussed the patient with Dr Angeles    The patient is optimized for their procedure. AVS with information on surgery time/arrival time, meds and NPO status given by nursing staff. No further diagnostic testing indicated.      On the day of service:     Prep time: 20 minutes  Visit time: 20 minutes  Documentation time: 15 minutes  ------------------------------------------  Total time: 55 minutes      Ivana Loaiza PA-C  Preoperative Assessment Center  [facility]  Phone: [phone]  Fax: [phone]

## 2024-03-01 ENCOUNTER — TELEPHONE (OUTPATIENT)
Dept: RADIOLOGY | Facility: CLINIC | Age: 54
End: 2024-03-01
Payer: MEDICARE

## 2024-03-01 NOTE — TELEPHONE ENCOUNTER
Patient is rescheduled for surgery with: Dr. Hayes    Surgery Date: 3/20     Location: [facility]    H&P: already completed      Post-op: not needed at this time    Patient is aware of arrival and procedure times.        Patient questions/concerns: N/A     Surgery packet N/A       Lissa Machado on 3/1/2024 at 11:14 AM

## 2024-03-15 DIAGNOSIS — I67.1 CEREBRAL ANEURYSM, NONRUPTURED: Primary | ICD-10-CM

## 2024-03-15 RX ORDER — HEPARIN SODIUM 200 [USP'U]/100ML
1 INJECTION, SOLUTION INTRAVENOUS CONTINUOUS PRN
Status: CANCELLED | OUTPATIENT
Start: 2024-03-15

## 2024-03-15 RX ORDER — NALOXONE HYDROCHLORIDE 0.4 MG/ML
0.4 INJECTION, SOLUTION INTRAMUSCULAR; INTRAVENOUS; SUBCUTANEOUS
Status: CANCELLED | OUTPATIENT
Start: 2024-03-15

## 2024-03-15 RX ORDER — NALOXONE HYDROCHLORIDE 0.4 MG/ML
0.2 INJECTION, SOLUTION INTRAMUSCULAR; INTRAVENOUS; SUBCUTANEOUS
Status: CANCELLED | OUTPATIENT
Start: 2024-03-15

## 2024-03-15 RX ORDER — FLUMAZENIL 0.1 MG/ML
0.2 INJECTION, SOLUTION INTRAVENOUS
Status: CANCELLED | OUTPATIENT
Start: 2024-03-15

## 2024-03-15 RX ORDER — FENTANYL CITRATE 50 UG/ML
25-50 INJECTION, SOLUTION INTRAMUSCULAR; INTRAVENOUS EVERY 5 MIN PRN
Status: CANCELLED | OUTPATIENT
Start: 2024-03-15

## 2024-03-16 ENCOUNTER — HEALTH MAINTENANCE LETTER (OUTPATIENT)
Age: 54
End: 2024-03-16

## 2024-03-19 ASSESSMENT — ENCOUNTER SYMPTOMS: SEIZURES: 0

## 2024-03-19 ASSESSMENT — LIFESTYLE VARIABLES: TOBACCO_USE: 1

## 2024-03-19 NOTE — ANESTHESIA PREPROCEDURE EVALUATION
Anesthesia Pre-Procedure Evaluation    Patient: Sheri Joyce   MRN: [id number] : 1970        Procedure : Procedure(s):  ANESTHESIA, IN NON-OPERATING ROOM SETTING Angiogram with Intervention @1315          Past Medical History:   Diagnosis Date    Anxiety     Brain aneurysm     Chronic kidney disease     Contraception 2009    Depression     Former tobacco use     Hypertension     Liver disease     PKD (polycystic kidney disease)     PONV (postoperative nausea and vomiting)     Thyroid disease     Varicosities       Past Surgical History:   Procedure Laterality Date    COLONOSCOPY N/A 2023    Procedure: Colonoscopy;  Surgeon: Lizzie Munoz MD;  [facility]    LAPAROSCOPIC DECORTICATION CYST RENAL  2006    LAPAROSCOPIC INSERTION CATHETER PERITONEAL DIALYSIS Right 2023    Procedure: INSERTION, CATHETER, DIALYSIS, PERITONEAL, LAPAROSCOPIC;  Surgeon: Juanita Hagen MD;  [facility]    MYRINGOTOMY, INSERT TUBE(S), ADENOIDECTOMY, COMBINED      PE TUBES  age 13    SURGICAL HISTORY OF -   2005    kidney surgery for cyst removal      Allergies   Allergen Reactions    Bactrim [Sulfamethoxazole W/Trimethoprim] Itching    Oxycodone-Acetaminophen Nausea and Vomiting    Seasonal Allergies     Pcn [Penicillin G]       Social History     Tobacco Use    Smoking status: Some Days     Packs/day: 0.50     Years: 10.00     Additional pack years: 0.00     Total pack years: 5.00     Types: Cigarettes     Last attempt to quit: 10/17/2022     Years since quittin.4     Passive exposure: Current    Smokeless tobacco: Never   Substance Use Topics    Alcohol use: Not Currently     Comment: Sober May 2023      Wt Readings from Last 1 Encounters:   24 72.1 kg (159 lb)        Anesthesia Evaluation   Pt has had prior anesthetic. Type: General and Regional (23: Direct view, mac 3 blade, size 7 tube. Grade 1 view Easy,).    History of anesthetic complications  -  PONV.      ROS/MED HX  ENT/Pulmonary:     (+)                tobacco use, Current use,                       Neurologic: Comment: Brain aneurysm   (-) no seizures and no CVA   Cardiovascular:     (+)  hypertension- -   -  - -                                 Previous cardiac testing   Echo: Date: Results:    Stress Test:  Date: 9/15/23 Results:  This was a normal stress echocardiogram with no evidence of stress-induced  ischemia. Normal resting LV function with EF of approximately 60-65%; normal  response to exercise with increase to approximately 70-75%. No stress induced  regional wall motion abnormalities. No ECG evidence of ischemia. No  significant valvular disease noted on routine screening color flow Doppler and  pulsed Doppler examination. No resting wallmotion abnormalities.  Reduced functional capacity.  Target Heart Rate was achieved.  Exercise was stopped due to fatigue.  The patient did not exhibit any symptoms during exercise.  Normal blood pressure response with stress.  ECG Reviewed:  Date: 9/15/12 Results:  Sinus bradycardia  Cath:  Date: Results:      METS/Exercise Tolerance: >4 METS    Hematologic:     (+)      anemia,          Musculoskeletal:  - neg musculoskeletal ROS     GI/Hepatic: Comment: constipation    (+) GERD, Asymptomatic on medication,           liver disease,       Renal/Genitourinary: Comment: The patient has polycystic kidney disease.    (+) renal disease (polycystic kidney disease), type: ESRD, Pt requires dialysis, type: Peritoneal dialysis,          Endo:     (+)          thyroid problem, hypothyroidism,           Psychiatric/Substance Use:     (+) psychiatric history anxiety and depression   Recreational drug usage: Cannabis.    Infectious Disease:  - neg infectious disease ROS     Malignancy:  - neg malignancy ROS     Other:            Physical Exam    Airway  airway exam normal      Mallampati: I   TM distance: > 3 FB   Neck ROM: full   Mouth opening: > 3 cm    Respiratory  Devices and Support         Dental           Cardiovascular   cardiovascular exam normal       Rhythm and rate: regular and normal     Pulmonary   pulmonary exam normal        breath sounds clear to auscultation             OUTSIDE LABS:  CBC:   Lab Results   Component Value Date    WBC 6.8 02/27/2024    WBC 6.4 12/08/2022    HGB 13.7 02/27/2024    HGB 10.1 (L) 12/27/2022    HCT 40.3 02/27/2024    HCT 32.5 (L) 12/08/2022     02/27/2024     12/08/2022     BMP:   Lab Results   Component Value Date     02/27/2024     12/27/2022    POTASSIUM 3.3 (L) 02/27/2024    POTASSIUM 3.9 12/27/2022    CHLORIDE 100 02/27/2024    CHLORIDE 103 12/27/2022    CO2 25 02/27/2024    CO2 24 12/27/2022    BUN 45.2 (H) 02/27/2024    BUN 42.3 (H) 12/27/2022    CR 5.91 (H) 02/27/2024    CR 3.86 (H) 12/27/2022     (H) 02/27/2024     (H) 12/27/2022     COAGS:   Lab Results   Component Value Date    PTT 35 12/08/2022    INR 0.97 12/08/2022     POC:   Lab Results   Component Value Date    HCG Negative 03/02/2005     HEPATIC:   Lab Results   Component Value Date    ALBUMIN 4.2 12/27/2022    PROTTOTAL 8.3 12/08/2022    ALT 15 12/08/2022    AST 10 12/08/2022    ALKPHOS 62 12/08/2022    BILITOTAL 0.3 12/08/2022     OTHER:   Lab Results   Component Value Date    A1C 5.1 07/25/2008    ADRIEN 9.4 02/27/2024    PHOS 5.4 (H) 12/27/2022    TSH 2.75 12/27/2022    T4 0.90 12/27/2022       Anesthesia Plan    ASA Status:  3    NPO Status:  NPO Appropriate    Anesthesia Type: General.     - Airway: ETT   Induction: Propofol, Intravenous.   Maintenance: TIVA.   Techniques and Equipment:     - Lines/Monitors: BIS     Consents    Anesthesia Plan(s) and associated risks, benefits, and realistic alternatives discussed. Questions answered and patient/representative(s) expressed understanding.     - Discussed: Risks, Benefits and Alternatives for BOTH SEDATION and the PROCEDURE were discussed     - Discussed with:  Patient      -  "Extended Intubation/Ventilatory Support Discussed: Yes.      - Patient is DNR/DNI Status: No     Use of blood products discussed: Yes.     - Discussed with: Patient.     Postoperative Care    Pain management: IV analgesics, Oral pain medications, Multi-modal analgesia.   PONV prophylaxis: Ondansetron (or other 5HT-3), Dexamethasone or Solumedrol, Aprepitant (Propofol TIVA)     Comments:    Other Comments: The material risks, benefits and alternatives were discussed in detail.  The patient agrees to proceed.  The patient has no other complaints at this time.           Wayne Santana MD    I have reviewed the pertinent notes and labs in the chart from the past 30 days and (re)examined the patient.  Any updates or changes from those notes are reflected in this note.    # Hypokalemia: Lowest K = 3.3 mmol/L in last 30 days, will replace as needed           # Overweight: Estimated body mass index is 27.29 kg/m  as calculated from the following:    Height as of 2/27/24: 1.626 m (5' 4\").    Weight as of 2/27/24: 72.1 kg (159 lb).      "

## 2024-03-20 ENCOUNTER — APPOINTMENT (OUTPATIENT)
Dept: INTERVENTIONAL RADIOLOGY/VASCULAR | Facility: CLINIC | Age: 54
DRG: 025 | End: 2024-03-20
Attending: RADIOLOGY
Payer: MEDICARE

## 2024-03-20 ENCOUNTER — ANESTHESIA (OUTPATIENT)
Dept: SURGERY | Facility: CLINIC | Age: 54
DRG: 025 | End: 2024-03-20
Payer: MEDICARE

## 2024-03-20 ENCOUNTER — HOSPITAL ENCOUNTER (INPATIENT)
Facility: CLINIC | Age: 54
LOS: 1 days | Discharge: HOME | DRG: 025 | End: 2024-03-21
Attending: RADIOLOGY | Admitting: RADIOLOGY
Payer: MEDICARE

## 2024-03-20 DIAGNOSIS — I67.1 BRAIN ANEURYSM: ICD-10-CM

## 2024-03-20 DIAGNOSIS — I67.1 CEREBRAL ANEURYSM, NONRUPTURED: ICD-10-CM

## 2024-03-20 LAB
ANION GAP SERPL CALCULATED.3IONS-SCNC: 14 MMOL/L (ref 7–15)
APTT PPP: 35 SECONDS (ref 22–38)
BUN SERPL-MCNC: 46.1 MG/DL (ref 6–20)
CALCIUM SERPL-MCNC: 9 MG/DL (ref 8.6–10)
CHLORIDE SERPL-SCNC: 106 MMOL/L (ref 98–107)
CREAT SERPL-MCNC: 5.33 MG/DL (ref 0.51–0.95)
DEPRECATED HCO3 PLAS-SCNC: 18 MMOL/L (ref 22–29)
EGFRCR SERPLBLD CKD-EPI 2021: 9 ML/MIN/1.73M2
ERYTHROCYTE [DISTWIDTH] IN BLOOD BY AUTOMATED COUNT: 13.2 % (ref 10–15)
ERYTHROCYTE [DISTWIDTH] IN BLOOD BY AUTOMATED COUNT: 13.4 % (ref 10–15)
GLUCOSE BLDC GLUCOMTR-MCNC: 111 MG/DL (ref 70–99)
GLUCOSE BLDC GLUCOMTR-MCNC: 152 MG/DL (ref 70–99)
GLUCOSE SERPL-MCNC: 99 MG/DL (ref 70–99)
HCT VFR BLD AUTO: 37.6 % (ref 35–47)
HCT VFR BLD AUTO: 39.1 % (ref 35–47)
HGB BLD-MCNC: 12.6 G/DL (ref 11.7–15.7)
HGB BLD-MCNC: 13.2 G/DL (ref 11.7–15.7)
HOLD SPECIMEN: NORMAL
HOLD SPECIMEN: NORMAL
INR PPP: 1.09 (ref 0.85–1.15)
MCH RBC QN AUTO: 30.4 PG (ref 26.5–33)
MCH RBC QN AUTO: 30.9 PG (ref 26.5–33)
MCHC RBC AUTO-ENTMCNC: 33.5 G/DL (ref 31.5–36.5)
MCHC RBC AUTO-ENTMCNC: 33.8 G/DL (ref 31.5–36.5)
MCV RBC AUTO: 90 FL (ref 78–100)
MCV RBC AUTO: 92 FL (ref 78–100)
PA ADP BLD-ACNC: 104 PRU
PLATELET # BLD AUTO: 196 10E3/UL (ref 150–450)
PLATELET # BLD AUTO: 219 10E3/UL (ref 150–450)
POTASSIUM SERPL-SCNC: 3.8 MMOL/L (ref 3.4–5.3)
RBC # BLD AUTO: 4.08 10E6/UL (ref 3.8–5.2)
RBC # BLD AUTO: 4.34 10E6/UL (ref 3.8–5.2)
SODIUM SERPL-SCNC: 138 MMOL/L (ref 135–145)
UFH PPP CHRO-ACNC: >1.1 IU/ML
WBC # BLD AUTO: 8.6 10E3/UL (ref 4–11)
WBC # BLD AUTO: 9.5 10E3/UL (ref 4–11)

## 2024-03-20 PROCEDURE — 999N000141 HC STATISTIC PRE-PROCEDURE NURSING ASSESSMENT

## 2024-03-20 PROCEDURE — C1876 STENT, NON-COA/NON-COV W/DEL: HCPCS

## 2024-03-20 PROCEDURE — 272N000193 HC ACCESSORY CR20

## 2024-03-20 PROCEDURE — C1769 GUIDE WIRE: HCPCS

## 2024-03-20 PROCEDURE — 85576 BLOOD PLATELET AGGREGATION: CPT

## 2024-03-20 PROCEDURE — 255N000002 HC RX 255 OP 636: Performed by: RADIOLOGY

## 2024-03-20 PROCEDURE — 76377 3D RENDER W/INTRP POSTPROCES: CPT | Mod: 26 | Performed by: RADIOLOGY

## 2024-03-20 PROCEDURE — 370N000017 HC ANESTHESIA TECHNICAL FEE, PER MIN

## 2024-03-20 PROCEDURE — 250N000009 HC RX 250

## 2024-03-20 PROCEDURE — 80048 BASIC METABOLIC PNL TOTAL CA: CPT | Performed by: STUDENT IN AN ORGANIZED HEALTH CARE EDUCATION/TRAINING PROGRAM

## 2024-03-20 PROCEDURE — C1889 IMPLANT/INSERT DEVICE, NOC: HCPCS

## 2024-03-20 PROCEDURE — C1757 CATH, THROMBECTOMY/EMBOLECT: HCPCS

## 2024-03-20 PROCEDURE — 250N000011 HC RX IP 250 OP 636: Performed by: STUDENT IN AN ORGANIZED HEALTH CARE EDUCATION/TRAINING PROGRAM

## 2024-03-20 PROCEDURE — 258N000003 HC RX IP 258 OP 636

## 2024-03-20 PROCEDURE — 272N000116 HC CATH CR1

## 2024-03-20 PROCEDURE — C1887 CATHETER, GUIDING: HCPCS

## 2024-03-20 PROCEDURE — 36415 COLL VENOUS BLD VENIPUNCTURE: CPT | Performed by: STUDENT IN AN ORGANIZED HEALTH CARE EDUCATION/TRAINING PROGRAM

## 2024-03-20 PROCEDURE — 61624 TCAT PERM OCCLS/EMBOLJ CNS: CPT

## 2024-03-20 PROCEDURE — 272N000194 HC ACCESSORY CR3

## 2024-03-20 PROCEDURE — 76937 US GUIDE VASCULAR ACCESS: CPT

## 2024-03-20 PROCEDURE — 200N000002 HC R&B ICU UMMC

## 2024-03-20 PROCEDURE — 76377 3D RENDER W/INTRP POSTPROCES: CPT

## 2024-03-20 PROCEDURE — 250N000013 HC RX MED GY IP 250 OP 250 PS 637

## 2024-03-20 PROCEDURE — 03VG3DZ RESTRICTION OF INTRACRANIAL ARTERY WITH INTRALUMINAL DEVICE, PERCUTANEOUS APPROACH: ICD-10-PCS | Performed by: STUDENT IN AN ORGANIZED HEALTH CARE EDUCATION/TRAINING PROGRAM

## 2024-03-20 PROCEDURE — 999N000090 HC STATISTIC LEVEL 2 EST PATIENT

## 2024-03-20 PROCEDURE — 36224 PLACE CATH CAROTD ART: CPT

## 2024-03-20 PROCEDURE — 250N000011 HC RX IP 250 OP 636

## 2024-03-20 PROCEDURE — 85014 HEMATOCRIT: CPT | Performed by: STUDENT IN AN ORGANIZED HEALTH CARE EDUCATION/TRAINING PROGRAM

## 2024-03-20 PROCEDURE — 85520 HEPARIN ASSAY: CPT | Performed by: STUDENT IN AN ORGANIZED HEALTH CARE EDUCATION/TRAINING PROGRAM

## 2024-03-20 PROCEDURE — 75894 X-RAYS TRANSCATH THERAPY: CPT

## 2024-03-20 PROCEDURE — 76937 US GUIDE VASCULAR ACCESS: CPT | Mod: 26 | Performed by: RADIOLOGY

## 2024-03-20 PROCEDURE — 85730 THROMBOPLASTIN TIME PARTIAL: CPT | Performed by: STUDENT IN AN ORGANIZED HEALTH CARE EDUCATION/TRAINING PROGRAM

## 2024-03-20 PROCEDURE — 250N000009 HC RX 250: Performed by: ANESTHESIOLOGY

## 2024-03-20 PROCEDURE — 61635 INTRACRAN ANGIOPLSTY W/STENT: CPT | Performed by: ANESTHESIOLOGY

## 2024-03-20 PROCEDURE — 250N000011 HC RX IP 250 OP 636: Performed by: ANESTHESIOLOGY

## 2024-03-20 PROCEDURE — 250N000025 HC SEVOFLURANE, PER MIN

## 2024-03-20 PROCEDURE — 85610 PROTHROMBIN TIME: CPT | Performed by: STUDENT IN AN ORGANIZED HEALTH CARE EDUCATION/TRAINING PROGRAM

## 2024-03-20 PROCEDURE — 75898 FOLLOW-UP ANGIOGRAPHY: CPT

## 2024-03-20 PROCEDURE — 272N000566 HC SHEATH CR3

## 2024-03-20 PROCEDURE — 250N000012 HC RX MED GY IP 250 OP 636 PS 637

## 2024-03-20 PROCEDURE — 61635 INTRACRAN ANGIOPLSTY W/STENT: CPT | Mod: GC | Performed by: RADIOLOGY

## 2024-03-20 PROCEDURE — 272N000572 HC SHEATH CR9

## 2024-03-20 PROCEDURE — 250N000013 HC RX MED GY IP 250 OP 250 PS 637: Performed by: STUDENT IN AN ORGANIZED HEALTH CARE EDUCATION/TRAINING PROGRAM

## 2024-03-20 PROCEDURE — 272N000192 HC ACCESSORY CR2

## 2024-03-20 PROCEDURE — C1760 CLOSURE DEV, VASC: HCPCS

## 2024-03-20 PROCEDURE — 710N000011 HC RECOVERY PHASE 1, LEVEL 3, PER MIN

## 2024-03-20 PROCEDURE — 272N000506 HC NEEDLE CR6

## 2024-03-20 RX ORDER — LIDOCAINE HYDROCHLORIDE 20 MG/ML
INJECTION, SOLUTION INFILTRATION; PERINEURAL PRN
Status: DISCONTINUED | OUTPATIENT
Start: 2024-03-20 | End: 2024-03-20

## 2024-03-20 RX ORDER — DEXAMETHASONE SODIUM PHOSPHATE 4 MG/ML
INJECTION, SOLUTION INTRA-ARTICULAR; INTRALESIONAL; INTRAMUSCULAR; INTRAVENOUS; SOFT TISSUE PRN
Status: DISCONTINUED | OUTPATIENT
Start: 2024-03-20 | End: 2024-03-20

## 2024-03-20 RX ORDER — ACETAMINOPHEN 325 MG/1
975 TABLET ORAL ONCE
Status: COMPLETED | OUTPATIENT
Start: 2024-03-20 | End: 2024-03-20

## 2024-03-20 RX ORDER — FAMOTIDINE 20 MG/1
40 TABLET, FILM COATED ORAL DAILY
Status: DISCONTINUED | OUTPATIENT
Start: 2024-03-21 | End: 2024-03-21

## 2024-03-20 RX ORDER — DEXTROSE MONOHYDRATE 25 G/50ML
25-50 INJECTION, SOLUTION INTRAVENOUS
Status: DISCONTINUED | OUTPATIENT
Start: 2024-03-20 | End: 2024-03-21 | Stop reason: HOSPADM

## 2024-03-20 RX ORDER — PROPOFOL 10 MG/ML
INJECTION, EMULSION INTRAVENOUS CONTINUOUS PRN
Status: DISCONTINUED | OUTPATIENT
Start: 2024-03-20 | End: 2024-03-20

## 2024-03-20 RX ORDER — HYDROMORPHONE HYDROCHLORIDE 1 MG/ML
0.4 INJECTION, SOLUTION INTRAMUSCULAR; INTRAVENOUS; SUBCUTANEOUS EVERY 5 MIN PRN
Status: DISCONTINUED | OUTPATIENT
Start: 2024-03-20 | End: 2024-03-21

## 2024-03-20 RX ORDER — ONDANSETRON 4 MG/1
4 TABLET, ORALLY DISINTEGRATING ORAL EVERY 30 MIN PRN
Status: DISCONTINUED | OUTPATIENT
Start: 2024-03-20 | End: 2024-03-21

## 2024-03-20 RX ORDER — POLYETHYLENE GLYCOL 3350 17 G/17G
17 POWDER, FOR SOLUTION ORAL EVERY MORNING
Status: DISCONTINUED | OUTPATIENT
Start: 2024-03-21 | End: 2024-03-21 | Stop reason: HOSPADM

## 2024-03-20 RX ORDER — SIMETHICONE 80 MG
80 TABLET,CHEWABLE ORAL DAILY
Status: DISCONTINUED | OUTPATIENT
Start: 2024-03-21 | End: 2024-03-21 | Stop reason: HOSPADM

## 2024-03-20 RX ORDER — FENTANYL CITRATE 50 UG/ML
50 INJECTION, SOLUTION INTRAMUSCULAR; INTRAVENOUS EVERY 5 MIN PRN
Status: DISCONTINUED | OUTPATIENT
Start: 2024-03-20 | End: 2024-03-21

## 2024-03-20 RX ORDER — APREPITANT 40 MG/1
40 CAPSULE ORAL ONCE
Status: COMPLETED | OUTPATIENT
Start: 2024-03-20 | End: 2024-03-20

## 2024-03-20 RX ORDER — SODIUM CHLORIDE, SODIUM LACTATE, POTASSIUM CHLORIDE, CALCIUM CHLORIDE 600; 310; 30; 20 MG/100ML; MG/100ML; MG/100ML; MG/100ML
INJECTION, SOLUTION INTRAVENOUS CONTINUOUS
Status: DISCONTINUED | OUTPATIENT
Start: 2024-03-20 | End: 2024-03-21 | Stop reason: HOSPADM

## 2024-03-20 RX ORDER — SENNOSIDES 8.6 MG
1 TABLET ORAL DAILY PRN
Status: DISCONTINUED | OUTPATIENT
Start: 2024-03-20 | End: 2024-03-21 | Stop reason: HOSPADM

## 2024-03-20 RX ORDER — SENNOSIDES 8.6 MG
1 TABLET ORAL AT BEDTIME
Status: DISCONTINUED | OUTPATIENT
Start: 2024-03-20 | End: 2024-03-21 | Stop reason: HOSPADM

## 2024-03-20 RX ORDER — LIDOCAINE 40 MG/G
CREAM TOPICAL
Status: DISCONTINUED | OUTPATIENT
Start: 2024-03-20 | End: 2024-03-21

## 2024-03-20 RX ORDER — SODIUM CHLORIDE, SODIUM LACTATE, POTASSIUM CHLORIDE, CALCIUM CHLORIDE 600; 310; 30; 20 MG/100ML; MG/100ML; MG/100ML; MG/100ML
INJECTION, SOLUTION INTRAVENOUS CONTINUOUS
Status: DISCONTINUED | OUTPATIENT
Start: 2024-03-20 | End: 2024-03-21

## 2024-03-20 RX ORDER — PROPOFOL 10 MG/ML
INJECTION, EMULSION INTRAVENOUS PRN
Status: DISCONTINUED | OUTPATIENT
Start: 2024-03-20 | End: 2024-03-20

## 2024-03-20 RX ORDER — ONDANSETRON 2 MG/ML
4 INJECTION INTRAMUSCULAR; INTRAVENOUS EVERY 30 MIN PRN
Status: DISCONTINUED | OUTPATIENT
Start: 2024-03-20 | End: 2024-03-21

## 2024-03-20 RX ORDER — LIDOCAINE 40 MG/G
CREAM TOPICAL
Status: DISCONTINUED | OUTPATIENT
Start: 2024-03-20 | End: 2024-03-21 | Stop reason: HOSPADM

## 2024-03-20 RX ORDER — NICOTINE POLACRILEX 4 MG
15-30 LOZENGE BUCCAL
Status: DISCONTINUED | OUTPATIENT
Start: 2024-03-20 | End: 2024-03-21 | Stop reason: HOSPADM

## 2024-03-20 RX ORDER — IODIXANOL 320 MG/ML
300 INJECTION, SOLUTION INTRAVASCULAR ONCE
Status: COMPLETED | OUTPATIENT
Start: 2024-03-20 | End: 2024-03-20

## 2024-03-20 RX ORDER — HEPARIN SODIUM 10000 [USP'U]/100ML
0-5000 INJECTION, SOLUTION INTRAVENOUS CONTINUOUS
Status: DISCONTINUED | OUTPATIENT
Start: 2024-03-20 | End: 2024-03-21

## 2024-03-20 RX ORDER — ASPIRIN 81 MG/1
81 TABLET ORAL DAILY
Status: DISCONTINUED | OUTPATIENT
Start: 2024-03-21 | End: 2024-03-21 | Stop reason: HOSPADM

## 2024-03-20 RX ORDER — FENTANYL CITRATE 50 UG/ML
INJECTION, SOLUTION INTRAMUSCULAR; INTRAVENOUS PRN
Status: DISCONTINUED | OUTPATIENT
Start: 2024-03-20 | End: 2024-03-20

## 2024-03-20 RX ORDER — HYDROMORPHONE HYDROCHLORIDE 1 MG/ML
0.2 INJECTION, SOLUTION INTRAMUSCULAR; INTRAVENOUS; SUBCUTANEOUS EVERY 5 MIN PRN
Status: DISCONTINUED | OUTPATIENT
Start: 2024-03-20 | End: 2024-03-21

## 2024-03-20 RX ORDER — NALOXONE HYDROCHLORIDE 0.4 MG/ML
0.1 INJECTION, SOLUTION INTRAMUSCULAR; INTRAVENOUS; SUBCUTANEOUS
Status: DISCONTINUED | OUTPATIENT
Start: 2024-03-20 | End: 2024-03-21

## 2024-03-20 RX ORDER — ONDANSETRON 2 MG/ML
INJECTION INTRAMUSCULAR; INTRAVENOUS PRN
Status: DISCONTINUED | OUTPATIENT
Start: 2024-03-20 | End: 2024-03-20

## 2024-03-20 RX ORDER — FENTANYL CITRATE 50 UG/ML
25 INJECTION, SOLUTION INTRAMUSCULAR; INTRAVENOUS EVERY 5 MIN PRN
Status: DISCONTINUED | OUTPATIENT
Start: 2024-03-20 | End: 2024-03-21

## 2024-03-20 RX ORDER — EPHEDRINE SULFATE 50 MG/ML
INJECTION, SOLUTION INTRAMUSCULAR; INTRAVENOUS; SUBCUTANEOUS PRN
Status: DISCONTINUED | OUTPATIENT
Start: 2024-03-20 | End: 2024-03-20

## 2024-03-20 RX ORDER — HEPARIN SODIUM 1000 [USP'U]/ML
INJECTION, SOLUTION INTRAVENOUS; SUBCUTANEOUS PRN
Status: DISCONTINUED | OUTPATIENT
Start: 2024-03-20 | End: 2024-03-20

## 2024-03-20 RX ORDER — AMLODIPINE BESYLATE 10 MG/1
10 TABLET ORAL EVERY MORNING
Status: DISCONTINUED | OUTPATIENT
Start: 2024-03-21 | End: 2024-03-21 | Stop reason: HOSPADM

## 2024-03-20 RX ADMIN — EPHEDRINE SULFATE 5 MG: 5 INJECTION INTRAVENOUS at 15:19

## 2024-03-20 RX ADMIN — HEPARIN SODIUM 1300 UNITS/HR: 10000 INJECTION, SOLUTION INTRAVENOUS at 16:36

## 2024-03-20 RX ADMIN — EPHEDRINE SULFATE 5 MG: 5 INJECTION INTRAVENOUS at 15:28

## 2024-03-20 RX ADMIN — ONDANSETRON 4 MG: 2 INJECTION INTRAMUSCULAR; INTRAVENOUS at 15:35

## 2024-03-20 RX ADMIN — PROPOFOL 150 MG: 10 INJECTION, EMULSION INTRAVENOUS at 13:49

## 2024-03-20 RX ADMIN — FENTANYL CITRATE 100 MCG: 50 INJECTION INTRAMUSCULAR; INTRAVENOUS at 13:49

## 2024-03-20 RX ADMIN — PHENYLEPHRINE HYDROCHLORIDE 100 MCG: 10 INJECTION INTRAVENOUS at 14:48

## 2024-03-20 RX ADMIN — SUGAMMADEX 200 MG: 100 INJECTION, SOLUTION INTRAVENOUS at 16:23

## 2024-03-20 RX ADMIN — DEXAMETHASONE SODIUM PHOSPHATE 4 MG: 4 INJECTION, SOLUTION INTRA-ARTICULAR; INTRALESIONAL; INTRAMUSCULAR; INTRAVENOUS; SOFT TISSUE at 14:05

## 2024-03-20 RX ADMIN — PHENYLEPHRINE HYDROCHLORIDE 50 MCG: 10 INJECTION INTRAVENOUS at 14:28

## 2024-03-20 RX ADMIN — ACETAMINOPHEN 975 MG: 325 TABLET, FILM COATED ORAL at 11:42

## 2024-03-20 RX ADMIN — Medication 50 MG: at 13:49

## 2024-03-20 RX ADMIN — Medication 20 MG: at 15:05

## 2024-03-20 RX ADMIN — PROPOFOL 150 MCG/KG/MIN: 10 INJECTION, EMULSION INTRAVENOUS at 13:51

## 2024-03-20 RX ADMIN — SODIUM CHLORIDE, POTASSIUM CHLORIDE, SODIUM LACTATE AND CALCIUM CHLORIDE: 600; 310; 30; 20 INJECTION, SOLUTION INTRAVENOUS at 15:45

## 2024-03-20 RX ADMIN — FENTANYL CITRATE 25 MCG: 50 INJECTION INTRAMUSCULAR; INTRAVENOUS at 14:29

## 2024-03-20 RX ADMIN — APREPITANT 40 MG: 40 CAPSULE ORAL at 11:42

## 2024-03-20 RX ADMIN — SODIUM CHLORIDE, POTASSIUM CHLORIDE, SODIUM LACTATE AND CALCIUM CHLORIDE: 600; 310; 30; 20 INJECTION, SOLUTION INTRAVENOUS at 13:41

## 2024-03-20 RX ADMIN — HEPARIN SODIUM 7000 UNITS: 1000 INJECTION INTRAVENOUS; SUBCUTANEOUS at 14:34

## 2024-03-20 RX ADMIN — Medication 20 MG: at 14:27

## 2024-03-20 RX ADMIN — LIDOCAINE HYDROCHLORIDE 60 MG: 20 INJECTION, SOLUTION INFILTRATION; PERINEURAL at 13:49

## 2024-03-20 RX ADMIN — PHENYLEPHRINE HYDROCHLORIDE 100 MCG: 10 INJECTION INTRAVENOUS at 14:44

## 2024-03-20 RX ADMIN — HEPARIN SODIUM 1000 UNITS: 1000 INJECTION INTRAVENOUS; SUBCUTANEOUS at 15:34

## 2024-03-20 RX ADMIN — PHENYLEPHRINE HYDROCHLORIDE 100 MCG: 10 INJECTION INTRAVENOUS at 15:04

## 2024-03-20 RX ADMIN — PHENYLEPHRINE HYDROCHLORIDE 50 MCG: 10 INJECTION INTRAVENOUS at 14:37

## 2024-03-20 RX ADMIN — SODIUM CHLORIDE, POTASSIUM CHLORIDE, SODIUM LACTATE AND CALCIUM CHLORIDE: 600; 310; 30; 20 INJECTION, SOLUTION INTRAVENOUS at 20:19

## 2024-03-20 RX ADMIN — EPHEDRINE SULFATE 10 MG: 5 INJECTION INTRAVENOUS at 15:10

## 2024-03-20 RX ADMIN — EPHEDRINE SULFATE 5 MG: 5 INJECTION INTRAVENOUS at 15:43

## 2024-03-20 RX ADMIN — IODIXANOL 150 ML: 320 INJECTION, SOLUTION INTRAVASCULAR at 16:24

## 2024-03-20 RX ADMIN — SENNOSIDES 1 TABLET: 8.6 TABLET, FILM COATED ORAL at 22:07

## 2024-03-20 RX ADMIN — TICAGRELOR 60 MG: 60 TABLET ORAL at 22:06

## 2024-03-20 RX ADMIN — MIDAZOLAM 2 MG: 1 INJECTION INTRAMUSCULAR; INTRAVENOUS at 13:38

## 2024-03-20 RX ADMIN — PHENYLEPHRINE HYDROCHLORIDE 100 MCG: 10 INJECTION INTRAVENOUS at 14:57

## 2024-03-20 ASSESSMENT — VISUAL ACUITY
OU: GLASSES

## 2024-03-20 ASSESSMENT — ACTIVITIES OF DAILY LIVING (ADL)
ADLS_ACUITY_SCORE: 20
ADLS_ACUITY_SCORE: 29
ADLS_ACUITY_SCORE: 18
ADLS_ACUITY_SCORE: 20

## 2024-03-20 NOTE — PROCEDURES
[facility]     Endovascular Surgical Neuroradiology Post-Procedure Note    Pre-Procedure Diagnosis:  Right MCA bifurcation aneurysm  Post-Procedure Diagnosis:  Right MCA bifurcation aneurysm    Procedure(s):   Endovascular stent-assisted WEB embolization of an intracranial aneurysm    Findings:  4.5 x 3 mm right MCA bifurcation aneurysm s/p 6 x 2 mm WEB SL placement and LVIS 2.5 x 23 mm stent placement from the frontal M2 extending proximally into the M1 segment    Plan:  2 hours flat bedrest, admit to ICU overnight, high intensity heparin gtt overnight    Primary Surgeon:  Dr. Jesus Hayes  Secondary Surgeon:  Not applicable  Secondary Surgeon Review:  None  Fellow:  Jozef Richards  Additional Assistants:  Bradley (NS resident)    Prior to the start of the procedure and with procedural staff participation, I verbally confirmed: the patient s identity using two indicators, relevant allergies, that the procedure was appropriate and matched the consent or emergent situation, and that the correct equipment/implants were available. Immediately prior to starting the procedure I conducted the Time Out with the procedural staff and re-confirmed the patient s name, procedure, and site/side. (The Joint Commission universal protocol was followed.)  Yes    PRU value:  104    Anesthesia:  Performed by Anesthesia  Medications:   lidocaine 1% 10 ml intradermal, heparin 8000 units IV  Puncture site:  Right Femoral Artery    Fluoroscopy time (minutes): 65.6  Radiation dose (mGy):   5287     Contrast amount (mL): 150    Estimated blood loss (mL):  30    Closure:  Device    Disposition:  Will be followed in hospital by the Neuro Endovascular team.        Sedation Post-Procedure Summary    Per Anesthesia    Janay Richards MD  Pager:  [phone]

## 2024-03-20 NOTE — ANESTHESIA POSTPROCEDURE EVALUATION
Patient: Sheri Joyce    Procedure: Procedure(s):  ANESTHESIA, IN NON-OPERATING ROOM SETTING Angiogram with Intervention @1315       Anesthesia Type:  General    Note:  Disposition: Outpatient   Postop Pain Control: Uneventful            Sign Out: Well controlled pain   PONV: No   Neuro/Psych: Uneventful            Sign Out: Acceptable/Baseline neuro status   Airway/Respiratory: Uneventful            Sign Out: Acceptable/Baseline resp. status   CV/Hemodynamics: Uneventful            Sign Out: Acceptable CV status; No obvious hypovolemia; No obvious fluid overload   Other NRE: NONE   DID A NON-ROUTINE EVENT OCCUR? No    Event details/Postop Comments:  No complications.           Last vitals:  Vitals Value Taken Time   /76 03/20/24 1646   Temp     Pulse 76 03/20/24 1650   Resp 21 03/20/24 1650   SpO2 100 % 03/20/24 1650   Vitals shown include unfiled device data.    Electronically Signed By: Ed Solis MD  March 20, 2024  4:50 PM

## 2024-03-20 NOTE — PROGRESS NOTES
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 She is a former smoker with well controlled HTN and no personal history of aneurysmal SAH. She presents for elective endovascular aneurysm treatment for the prevention of growth and/or rupture. She is in her normal state of health.    Medical History:  Past Medical History:   Diagnosis Date    Anxiety     Brain aneurysm     Chronic kidney disease     Contraception 03/19/2009    Depression     Former tobacco use     Hypertension     Liver disease     PKD (polycystic kidney disease)     PONV (postoperative nausea and vomiting)     Thyroid disease     Varicosities        Surgical History:  Past Surgical History:   Procedure Laterality Date    COLONOSCOPY N/A 4/7/2023    Procedure: Colonoscopy;  Surgeon: Lizzie Munoz MD;  Location: [facility]    LAPAROSCOPIC DECORTICATION CYST RENAL  01/01/2006    LAPAROSCOPIC INSERTION CATHETER PERITONEAL DIALYSIS Right 1/11/2023    Procedure: INSERTION, CATHETER, DIALYSIS, PERITONEAL, LAPAROSCOPIC;  Surgeon: Juanita Hagen MD;  Location: [facility] OR    MYRINGOTOMY, INSERT TUBE(S), ADENOIDECTOMY, COMBINED      PE TUBES  age 13    SURGICAL HISTORY OF -   11/01/2005    kidney surgery for cyst removal       Family History:  Family History   Problem Relation Age of Onset    Lung Cancer Mother         lung    Hypertension Father     Breast Cancer Maternal Aunt     Cerebrovascular Disease Paternal Aunt 70    Breast Cancer Cousin     Breast Cancer Cousin     Diabetes No family hx of     C.A.D. No family hx of     Cancer - colorectal No family hx of     Prostate Cancer No family hx of     Anesthesia Reaction No family hx of     Venous thrombosis No family hx of        Social History:  Social History     Socioeconomic  History    Marital status:      Spouse name: Not on file    Number of children: Not on file    Years of education: Not on file    Highest education level: Not on file   Occupational History    Not on file   Tobacco Use    Smoking status: Some Days     Packs/day: 0.50     Years: 10.00     Additional pack years: 0.00     Total pack years: 5.00     Types: Cigarettes     Last attempt to quit: 10/17/2022     Years since quittin.4     Passive exposure: Current    Smokeless tobacco: Never   Vaping Use    Vaping Use: Never used   Substance and Sexual Activity    Alcohol use: Not Currently     Comment: Sober May 2023    Drug use: Yes     Types: Marijuana     Comment: Smokes once daily to induce appetite.    Sexual activity: Yes     Partners: Male     Birth control/protection: None   Other Topics Concern    Parent/sibling w/ CABG, MI or angioplasty before 65F 55M? No   Social History Narrative    ** Merged History Encounter **         Dairy/d 2 servings/d.     Caffeine 3 servings/d    Exercise 5-7 x week walking    Sunscreen used - Yes    Seatbelts used - Yes    Working smoke/CO detectors in the home - NO    Guns stored in the home - No    Self Breast Exams - Yes    Self Testicular Exam - NA    Eye Exam up to date - Yes    Dental Exam up to date - No    Pap Smear up to date - Yes    Mammogram up to date - No    PSA up to date - NA    Dexa Scan up to date - NA    Flex Sig / Colonoscopy up to date - NA    Immunizations up to date - No    Abuse: Current or Past(Physical, Sexual or Emotional)- No    Do you feel safe in your environment - Yes    HONG KOCH LPN.    06         Social Determinants of Health     Financial Resource Strain: Not on file   Food Insecurity: Not on file   Transportation Needs: Not on file   Physical Activity: Not on file   Stress: Not on file   Social Connections: Not on file   Interpersonal Safety: Not on file   Housing Stability: Not on file       Allergies:  Allergies   Allergen  Reactions    Bactrim [Sulfamethoxazole W/Trimethoprim] Itching    Oxycodone-Acetaminophen Nausea and Vomiting    Seasonal Allergies     Pcn [Penicillin G]        Is there a contrast allergy?  No    Medications:  I have reviewed this patient's current medications.    ROS:  The 10 point Review of Systems is negative other than noted in the HPI or here.     PHYSICAL EXAMINATION  Vital Signs:  B/P: 111/94,  T: 97.9,  P: 73,  R: 18    Cardio:  RRR  Pulmonary:  no respiratory distress  Abdomen:  soft, non-tender, non-distended    Neurologic  Mental Status:  fully alert, attentive and oriented, follows commands, speech clear and fluent  Cranial Nerves:  visual fields intact, PERRL, EOMI with normal smooth pursuit, facial sensation intact and symmetric, facial movements symmetric, hearing not formally tested but intact to conversation, palate elevation symmetric and uvula midline, no dysarthria, shoulder shrug strong bilaterally, tongue protrusion midline  Motor:  strength 5/5 throughout upper and lower extremities  Sensory:  intact/symmetric to light touch and pin prick throughout upper and lower extremities  Coordination:  FNF and HS intact without dysmetria    Pre-procedure National Institutes of Health Stroke Scale:   Not applicable    LABS  (most recent Cr, BUN, GFR, PLT, INR, PTT within the past 7 days):  No lab results found in last 7 days.     Platelet Function P2Y12 (PRU):   pending      ASSESSMENT: Right middle cerebral artery bifurcation enlarging saccular aneurysm    PLAN: Cerebral angiogram with intent to treat the right middle cerebral artery aneurysm with WEB embolization        PRE-PROCEDURE SEDATION ASSESSMENT     Per Anesthesia    Patient was discussed with the Attending, Dr. Hayes, who agrees with the plan.    Janay Richards MD   Pager: [phone]

## 2024-03-20 NOTE — IR NOTE
Patient Name: Sheri Joyce  Medical Record Number: [id number]  Today's Date: 3/20/2024    Procedure: Carotid Cerebral Angiogram with Intervention.  Proceduralist: Dr. Hayes, Dr. Richards, and Dr. Haskins.  Pathology present: NA    Procedure Start: 1424  Procedure end: 1625  Sedation medications administered: General Anesthesia      Report given to: PACU RN  : EYAL    Other Notes: Pt arrived to IR room 3 from . Consent reviewed. Pre-procedure brief performed with Anesthesia.  Pt denies any questions or concerns regarding procedure. Pt positioned supine and monitored per protocol. Tao catheter placed pre-operatively.  Pt tolerated procedure without any noted complications. Pt transferred to PACU.    Sheath removed at 1622 Angioseal Closure device deployed 1622   Groin site appearance: dry/flat/intact  Pedal pulse assessment:  WDL; +2.   Bedrest for 2 hours.

## 2024-03-20 NOTE — PROGRESS NOTES
Paged re: planned procedure, in PACU and will transfer to  for overnight monitoring, plan to discharge home if no concerns noted.  Discussed with on call nephrology.   Call placed to [facility], spoke with Denisa, on-call PD RN to review patient information.  Home PD RX:  Three cycles over six hours, 95% Tidal, full drain after the third cycle, no final fill/day dwell. Most recent kt/v was 2.4 on 3/8/2024. Patient makes 1,100 ml of urine in a 24 hour period per this month's lab evaluation. Target weight of 71 kg.  PD RN will prepare therapy if needed, will review with nephrology after consult/assessment done.  May not need to dialyze this evening with the overnight observation. PD RN available as needed by sandy or Sathish.

## 2024-03-20 NOTE — ANESTHESIA PROCEDURE NOTES
Airway       Patient location during procedure: OR       Procedure Start/Stop Times: 3/20/2024 1:52 PM  Staff -        Anesthesiologist:  Ed Solis MD       Resident/Fellow: Wayne Santana MD       Performed By: resident  Consent for Airway        Urgency: elective  Indications and Patient Condition       Indications for airway management: samia-procedural       Induction type:intravenous       Mask difficulty assessment: 1 - vent by mask    Final Airway Details       Final airway type: endotracheal airway       Successful airway: ETT - single  Endotracheal Airway Details        ETT size (mm): 7.0       Cuffed: yes       Successful intubation technique: direct laryngoscopy       DL Blade Type: MAC 3       Grade View of Cords: 1       Adjucts: stylet       Position: Right       Measured from: lips       Secured at (cm): 22       Bite block used: None    Post intubation assessment        Number of attempts at approach: 1       Number of other approaches attempted: 0       Secured with: tape       Ease of procedure: easy       Dentition: Lips/oral mucosa injury (Right lower lip)    Medication(s) Administered   Medication Administration Time: 3/20/2024 1:52 PM

## 2024-03-20 NOTE — ANESTHESIA CARE TRANSFER NOTE
Patient: Sheri Joyce    Procedure: Procedure(s):  ANESTHESIA, IN NON-OPERATING ROOM SETTING Angiogram with Intervention @1315       Diagnosis: Brain aneurysm [I67.1]  Diagnosis Additional Information: No value filed.    Anesthesia Type:   General     Note:    Oropharynx: oropharynx clear of all foreign objects  Level of Consciousness: awake  Oxygen Supplementation: face mask  Level of Supplemental Oxygen (L/min / FiO2): 8  Independent Airway: airway patency satisfactory and stable  Dentition: dentition unchanged  Vital Signs Stable: post-procedure vital signs reviewed and stable  Report to RN Given: handoff report given  Patient transferred to: PACU    Handoff Report: Identifed the Patient, Identified the Reponsible Provider, Reviewed the pertinent medical history, Discussed the surgical course, Reviewed Intra-OP anesthesia mangement and issues during anesthesia, Set expectations for post-procedure period and Allowed opportunity for questions and acknowledgement of understanding    Vitals:  Vitals Value Taken Time   /76 03/20/24 1646   Temp     Pulse 77 03/20/24 1651   Resp 15 03/20/24 1651   SpO2 98 % 03/20/24 1651   Vitals shown include unfiled device data.    Electronically Signed By: RYDER Milian CRNA  March 20, 2024  4:52 PM   Home

## 2024-03-21 VITALS
SYSTOLIC BLOOD PRESSURE: 139 MMHG | RESPIRATION RATE: 16 BRPM | WEIGHT: 164.02 LBS | HEART RATE: 72 BPM | OXYGEN SATURATION: 98 % | TEMPERATURE: 98 F | DIASTOLIC BLOOD PRESSURE: 89 MMHG | HEIGHT: 64 IN | BODY MASS INDEX: 28 KG/M2

## 2024-03-21 LAB
ANION GAP SERPL CALCULATED.3IONS-SCNC: 15 MMOL/L (ref 7–15)
BUN SERPL-MCNC: 43.2 MG/DL (ref 6–20)
CALCIUM SERPL-MCNC: 8.3 MG/DL (ref 8.6–10)
CHLORIDE SERPL-SCNC: 105 MMOL/L (ref 98–107)
CREAT SERPL-MCNC: 4.95 MG/DL (ref 0.51–0.95)
DEPRECATED HCO3 PLAS-SCNC: 17 MMOL/L (ref 22–29)
EGFRCR SERPLBLD CKD-EPI 2021: 10 ML/MIN/1.73M2
ERYTHROCYTE [DISTWIDTH] IN BLOOD BY AUTOMATED COUNT: 13.3 % (ref 10–15)
GLUCOSE BLDC GLUCOMTR-MCNC: 139 MG/DL (ref 70–99)
GLUCOSE SERPL-MCNC: 121 MG/DL (ref 70–99)
HCT VFR BLD AUTO: 32.3 % (ref 35–47)
HGB BLD-MCNC: 11.1 G/DL (ref 11.7–15.7)
MCH RBC QN AUTO: 31.4 PG (ref 26.5–33)
MCHC RBC AUTO-ENTMCNC: 34.4 G/DL (ref 31.5–36.5)
MCV RBC AUTO: 91 FL (ref 78–100)
PA ADP BLD-ACNC: 153 PRU
PHOSPHATE SERPL-MCNC: 5 MG/DL (ref 2.5–4.5)
PLATELET # BLD AUTO: 200 10E3/UL (ref 150–450)
POTASSIUM SERPL-SCNC: 3.4 MMOL/L (ref 3.4–5.3)
RBC # BLD AUTO: 3.54 10E6/UL (ref 3.8–5.2)
SODIUM SERPL-SCNC: 137 MMOL/L (ref 135–145)
UFH PPP CHRO-ACNC: 1.02 IU/ML
WBC # BLD AUTO: 12.4 10E3/UL (ref 4–11)

## 2024-03-21 PROCEDURE — 250N000013 HC RX MED GY IP 250 OP 250 PS 637

## 2024-03-21 PROCEDURE — 250N000009 HC RX 250

## 2024-03-21 PROCEDURE — 250N000013 HC RX MED GY IP 250 OP 250 PS 637: Performed by: STUDENT IN AN ORGANIZED HEALTH CARE EDUCATION/TRAINING PROGRAM

## 2024-03-21 PROCEDURE — 85520 HEPARIN ASSAY: CPT | Performed by: RADIOLOGY

## 2024-03-21 PROCEDURE — 36415 COLL VENOUS BLD VENIPUNCTURE: CPT | Performed by: STUDENT IN AN ORGANIZED HEALTH CARE EDUCATION/TRAINING PROGRAM

## 2024-03-21 PROCEDURE — 999N000090 HC STATISTIC LEVEL 2 EST PATIENT

## 2024-03-21 PROCEDURE — 99221 1ST HOSP IP/OBS SF/LOW 40: CPT | Mod: GC | Performed by: INTERNAL MEDICINE

## 2024-03-21 PROCEDURE — 84100 ASSAY OF PHOSPHORUS: CPT | Performed by: STUDENT IN AN ORGANIZED HEALTH CARE EDUCATION/TRAINING PROGRAM

## 2024-03-21 PROCEDURE — 85576 BLOOD PLATELET AGGREGATION: CPT

## 2024-03-21 PROCEDURE — 85027 COMPLETE CBC AUTOMATED: CPT | Performed by: STUDENT IN AN ORGANIZED HEALTH CARE EDUCATION/TRAINING PROGRAM

## 2024-03-21 PROCEDURE — 80048 BASIC METABOLIC PNL TOTAL CA: CPT | Performed by: STUDENT IN AN ORGANIZED HEALTH CARE EDUCATION/TRAINING PROGRAM

## 2024-03-21 RX ORDER — LIDOCAINE HYDROCHLORIDE AND EPINEPHRINE 10; 10 MG/ML; UG/ML
INJECTION, SOLUTION INFILTRATION; PERINEURAL
Status: COMPLETED
Start: 2024-03-21 | End: 2024-03-21

## 2024-03-21 RX ORDER — ASPIRIN 81 MG/1
81 TABLET ORAL DAILY
Status: SHIPPED
Start: 2024-03-21

## 2024-03-21 RX ORDER — LIDOCAINE HYDROCHLORIDE AND EPINEPHRINE 10; 10 MG/ML; UG/ML
10 INJECTION, SOLUTION INFILTRATION; PERINEURAL ONCE
Status: COMPLETED | OUTPATIENT
Start: 2024-03-21 | End: 2024-03-21

## 2024-03-21 RX ORDER — SENNOSIDES 8.6 MG
2 TABLET ORAL AT BEDTIME
COMMUNITY

## 2024-03-21 RX ORDER — ACETAMINOPHEN 325 MG/1
650 TABLET ORAL EVERY 4 HOURS PRN
Status: DISCONTINUED | OUTPATIENT
Start: 2024-03-21 | End: 2024-03-21 | Stop reason: HOSPADM

## 2024-03-21 RX ORDER — FAMOTIDINE 20 MG/1
20 TABLET, FILM COATED ORAL EVERY OTHER DAY
Status: DISCONTINUED | OUTPATIENT
Start: 2024-03-21 | End: 2024-03-21 | Stop reason: HOSPADM

## 2024-03-21 RX ADMIN — ACETAMINOPHEN 650 MG: 325 TABLET, FILM COATED ORAL at 06:13

## 2024-03-21 RX ADMIN — LIDOCAINE HYDROCHLORIDE,EPINEPHRINE BITARTRATE 10 ML: 10; .01 INJECTION, SOLUTION INFILTRATION; PERINEURAL at 09:07

## 2024-03-21 RX ADMIN — ACETAMINOPHEN 650 MG: 325 TABLET, FILM COATED ORAL at 16:12

## 2024-03-21 RX ADMIN — LIDOCAINE HYDROCHLORIDE AND EPINEPHRINE 10 ML: 10; 10 INJECTION, SOLUTION INFILTRATION; PERINEURAL at 09:07

## 2024-03-21 RX ADMIN — ASPIRIN 81 MG: 81 TABLET ORAL at 09:06

## 2024-03-21 RX ADMIN — TICAGRELOR 60 MG: 60 TABLET ORAL at 09:06

## 2024-03-21 RX ADMIN — CHOLECALCIFEROL (VITAMIN D3) 10 MCG (400 UNIT) TABLET 10 MCG: at 08:07

## 2024-03-21 RX ADMIN — SIMETHICONE 80 MG: 80 TABLET, CHEWABLE ORAL at 11:48

## 2024-03-21 RX ADMIN — ACETAMINOPHEN 650 MG: 325 TABLET, FILM COATED ORAL at 11:48

## 2024-03-21 RX ADMIN — AMLODIPINE BESYLATE 10 MG: 10 TABLET ORAL at 08:07

## 2024-03-21 ASSESSMENT — VISUAL ACUITY
OU: GLASSES

## 2024-03-21 ASSESSMENT — ACTIVITIES OF DAILY LIVING (ADL)
ADLS_ACUITY_SCORE: 32
ADLS_ACUITY_SCORE: 32
ADLS_ACUITY_SCORE: 27
ADLS_ACUITY_SCORE: 30
ADLS_ACUITY_SCORE: 32
ADLS_ACUITY_SCORE: 27
ADLS_ACUITY_SCORE: 30
ADLS_ACUITY_SCORE: 27
ADLS_ACUITY_SCORE: 32
ADLS_ACUITY_SCORE: 32
ADLS_ACUITY_SCORE: 26
ADLS_ACUITY_SCORE: 30
ADLS_ACUITY_SCORE: 27
ADLS_ACUITY_SCORE: 32
ADLS_ACUITY_SCORE: 32

## 2024-03-21 NOTE — CONSULTS
Nephrology Initial Consult  March 21, 2024      Sheri Joyce MRN:[id number] YOB: 1970  Date of Admission:3/20/2024  Primary care provider: Tianna Vieyra  Requesting physician: Jesus Hayes*    ASSESSMENT AND RECOMMENDATIONS:   53 year old year old female patient admitted on 3/20/2024 with ADPKD on PD, enlarging right MCA bifurcation aneurysm detected during normal surveillance. She is admitted for elective embolization of the aneurysm for the prevention of aneurysm growth and/or rupture.       #ADPKD on PD since 2023  Right MCA bifurcation aneurysm s/p stent-assisted WEB embolization 3/20  Pt is currently admitted for observation after her procedure   Home regimen 3 cycles , 95% Tidal, full drain after the third cycle, no final fill/day dwell. Most recent kt/v was 2.4 on 3/8/2024. Patient makes 1,100 ml of urine in a 24 hour period per this month's lab evaluation. Target weight of 71 kg.  She is also listed on transplant list   -if pt stays admitted then we will resume her home PD       #BP/volume  C/w home amlodipine 10      #rt groin hematoma   injected lido+epi, heparin stopped, will continue to monitor       Recommendations were communicated to primary team via     Seen and discussed with Dr. 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 She is a former smoker with well controlled HTN and no personal history of aneurysmal SAH. She presents for elective endovascular aneurysm treatment for the prevention of growth and/or rupture. She is in her normal state of health.     PAST MEDICAL HISTORY:  Reviewed with patient on 03/21/2024     Past Medical History:   Diagnosis Date    Anxiety     Brain aneurysm      Chronic kidney disease     Contraception 03/19/2009    Depression     Former tobacco use     Hypertension     Liver disease     PKD (polycystic kidney disease)     PONV (postoperative nausea and vomiting)     Thyroid disease     Varicosities        Past Surgical History:   Procedure Laterality Date    COLONOSCOPY N/A 4/7/2023    Procedure: Colonoscopy;  Surgeon: Lizzie Munoz MD;  Location: [facility]    LAPAROSCOPIC DECORTICATION CYST RENAL  01/01/2006    LAPAROSCOPIC INSERTION CATHETER PERITONEAL DIALYSIS Right 1/11/2023    Procedure: INSERTION, CATHETER, DIALYSIS, PERITONEAL, LAPAROSCOPIC;  Surgeon: Juanita Hagen MD;  Location: [facility] OR    MYRINGOTOMY, INSERT TUBE(S), ADENOIDECTOMY, COMBINED      PE TUBES  age 13    SURGICAL HISTORY OF -   11/01/2005    kidney surgery for cyst removal        MEDICATIONS:  PTA Meds  Prior to Admission medications    Medication Sig Last Dose Taking? Auth Provider Long Term End Date   amLODIPine (NORVASC) 10 MG tablet Take 1 tablet (10 mg) by mouth daily 3/20/2024 at AM Yes Jayne Davis MD Yes    aspirin 81 MG EC tablet Begin taking 1 tab daily 5 days prior to procedure 3/20/2024 at AM Yes Jesus Hayes MD     cephALEXin (KEFLEX) 250 MG capsule Take 250 mg by mouth daily as needed  Yes Unknown, Entered By History     diphenhydrAMINE-acetaminophen (TYLENOL PM)  MG tablet Take 2 tablets by mouth nightly as needed for sleep 3/19/2024 at PM Yes Reported, Patient     famotidine (PEPCID) 10 MG tablet Take 10 mg by mouth daily as needed for heartburn 3/18/2024 at PM Yes Reported, Patient     medical cannabis (Patient's own supply) 1 Dose See Admin Instructions (The purpose of this order is to document that the patient reports taking medical cannabis.  This is not a prescription, and is not used to certify that the patient has a qualifying medical condition.)    Daily smoking 3/19/2024 at PM Yes Reported, Patient     polyethylene glycol (MIRALAX) 17  GM/Dose powder Take 17 g by mouth daily 3/20/2024 at AM Yes Bryce Lawrence MD     sennosides (SENOKOT) 8.6 MG tablet Take 2 tablets by mouth at bedtime 3/19/2024 at PM Yes Unknown, Entered By History     simethicone (MYLICON) 80 MG chewable tablet Take 80 mg by mouth daily as needed for cramping or flatulence Past Week Yes Reported, Patient     ticagrelor (BRILINTA) 60 MG tablet Begin taking 1 tab twice daily 5 days prior to procedure 3/20/2024 at AM Yes Jesus Hayes MD Yes    Vitamin D, Cholecalciferol, 10 MCG (400 UNIT) TABS Take 1 tablet by mouth daily 3/19/2024 at AM Yes Reported, Patient        Current Meds   amLODIPine  10 mg Oral QAM    aspirin  81 mg Oral Daily    cholecalciferol  10 mcg Oral Daily    famotidine  20 mg Oral Every Other Day    polyethylene glycol  17 g Oral QAM    sennosides  1 tablet Oral At Bedtime    simethicone  80 mg Oral Daily    sodium chloride (PF)  3 mL Intracatheter Q8H    ticagrelor  60 mg Oral BID     Infusion Meds   lactated ringers 5 mL/hr at 24 2100    - MEDICATION INSTRUCTIONS -         ALLERGIES:    Allergies   Allergen Reactions    Bactrim [Sulfamethoxazole W/Trimethoprim] Itching    Oxycodone-Acetaminophen Nausea and Vomiting    Seasonal Allergies     Pcn [Penicillin G]        REVIEW OF SYSTEMS:  A comprehensive of systems was negative except as noted above.    SOCIAL HISTORY:   Social History     Socioeconomic History    Marital status:      Spouse name: Not on file    Number of children: Not on file    Years of education: Not on file    Highest education level: Not on file   Occupational History    Not on file   Tobacco Use    Smoking status: Some Days     Packs/day: 0.50     Years: 10.00     Additional pack years: 0.00     Total pack years: 5.00     Types: Cigarettes     Last attempt to quit: 10/17/2022     Years since quittin.4     Passive exposure: Current    Smokeless tobacco: Never   Vaping Use    Vaping Use: Never used   Substance  and Sexual Activity    Alcohol use: Not Currently     Comment: Sober May 2023    Drug use: Yes     Types: Marijuana     Comment: Smokes once daily to induce appetite.    Sexual activity: Yes     Partners: Male     Birth control/protection: None   Other Topics Concern    Parent/sibling w/ CABG, MI or angioplasty before 65F 55M? No   Social History Narrative    ** Merged History Encounter **         Dairy/d 2 servings/d.     Caffeine 3 servings/d    Exercise 5-7 x week walking    Sunscreen used - Yes    Seatbelts used - Yes    Working smoke/CO detectors in the home - NO    Guns stored in the home - No    Self Breast Exams - Yes    Self Testicular Exam - NA    Eye Exam up to date - Yes    Dental Exam up to date - No    Pap Smear up to date - Yes    Mammogram up to date - No    PSA up to date - NA    Dexa Scan up to date - NA    Flex Sig / Colonoscopy up to date - NA    Immunizations up to date - No    Abuse: Current or Past(Physical, Sexual or Emotional)- No    Do you feel safe in your environment - Yes    HONG KOCH LPN.    06/02/06         Social Determinants of Health     Financial Resource Strain: Not on file   Food Insecurity: Not on file   Transportation Needs: Not on file   Physical Activity: Not on file   Stress: Not on file   Social Connections: Not on file   Interpersonal Safety: Not on file   Housing Stability: Not on file     Reviewed with patient    accompanies Sheri Joyce in hospital room    FAMILY MEDICAL HISTORY:   Family History   Problem Relation Age of Onset    Lung Cancer Mother         lung    Hypertension Father     Breast Cancer Maternal Aunt     Cerebrovascular Disease Paternal Aunt 70    Breast Cancer Cousin     Breast Cancer Cousin     Diabetes No family hx of     C.A.D. No family hx of     Cancer - colorectal No family hx of     Prostate Cancer No family hx of     Anesthesia Reaction No family hx of     Venous thrombosis No family hx of      Reviewed with patient     PHYSICAL EXAM:  "  Temp  Av.6  F (36.4  C)  Min: 97.3  F (36.3  C)  Max: 97.9  F (36.6  C)      Pulse  Av.9  Min: 66  Max: 88 Resp  Avg: 15.1  Min: 10  Max: 21  SpO2  Av.4 %  Min: 95 %  Max: 100 %       BP (!) 117/100   Pulse 88   Temp 97.7  F (36.5  C) (Axillary)   Resp 15   Ht 1.626 m (5' 4\")   Wt 74.4 kg (164 lb 0.4 oz)   LMP  (LMP Unknown)   SpO2 100%   BMI 28.15 kg/m     Date 24 07 - 24 0659   Shift 1175-8107 2240-9302 7761-0666 24 Hour Total   INTAKE   I.V. 18.33   18.33   Shift Total(mL/kg) 18.33(0.25)   18.33(0.25)   OUTPUT   Urine 550   550   Shift Total(mL/kg) 550(7.39)   550(7.39)   Weight (kg) 74.4 74.4 74.4 74.4      Admit Weight: 72.6 kg (160 lb 0.9 oz)   Right groin puncture site nontender, flat, oozing when dressing removed, hip is ecchymotic.  Awake, alert, attentive, oriented to self, time, place, and circumstance. Language is fluent and coherent with intact comprehension of complex and crossed commands, naming, and repetition. No visuospatial hemineglect.  VFF, PERRL,EOMI, facial sensation intact, face symmetric, tongue midline, no dysarthria.  No drift in 4/4 extremities, no abnormal movements  Sensation intact to light touch in 4/4 extremities. No hemisensory neglect.  No ataxia or dysmetria to finger-nose-finger or heel-to-shin testing  Gait not tested    LABS:   CMP  Recent Labs   Lab 24  0608 24  0126 24  1252     --   --  138   POTASSIUM 3.4  --   --  3.8   CHLORIDE 105  --   --  106   CO2 17*  --   --  18*   ANIONGAP 15  --   --  14   * 139* 152* 99   BUN 43.2*  --   --  46.1*   CR 4.95*  --   --  5.33*   GFRESTIMATED 10*  --   --  9*   ADRIEN 8.3*  --   --  9.0   PHOS 5.0*  --   --   --      CBC  Recent Labs   Lab 24  0608 24  1720 24  1252   HGB 11.1* 12.6 13.2   WBC 12.4* 8.6 9.5   RBC 3.54* 4.08 4.34   HCT 32.3* 37.6 39.1   MCV 91 92 90   MCH 31.4 30.9 30.4   MCHC 34.4 33.5 33.8   RDW 13.3 13.4 13.2   PLT " 200 196 219     INR  Recent Labs   Lab 03/20/24  1252   INR 1.09   PTT 35     ABGNo lab results found in last 7 days.   URINE STUDIES  Recent Labs   Lab Test 12/08/22  0910 10/12/22  1126 08/04/20  0819 10/31/19  1609 10/23/18  1100 06/27/18  1116   COLOR Yellow Colorless Light Yellow Light Yellow   < > Yellow   APPEARANCE Clear Clear Clear Clear   < > Clear   URINEGLC Negative 30* Negative Negative   < > Negative   URINEBILI Negative Negative Negative Negative   < > Negative   URINEKETONE Negative Negative Negative Negative   < > Negative   SG <=1.005 1.004 1.005 1.008   < > 1.010   UBLD Trace* Small* Negative Negative   < > Trace*   URINEPH 5.5 5.5 6.5 6.0   < > 6.5   PROTEIN 30* 50* 30* 10*   < > 30*   UROBILINOGEN 0.2  --   --   --   --  0.2   NITRITE Negative Negative Negative Negative   < > Negative   LEUKEST Trace* Negative Negative Negative   < > Negative   RBCU None Seen 0-2 O - 2 O - 2   < > O - 2   WBCU 5-10* None Seen 0 - 5 0 - 5   < > 0 - 5    < > = values in this interval not displayed.     Recent Labs   Lab Test 10/31/19  1609   UTPG 0.54*     PTH  Recent Labs   Lab Test 12/27/22  0938 12/08/22  0910 11/30/22  0817 10/12/22  1126 09/08/22  0839 10/31/19  1604   PTHI 156* 44 61 71* 104* 72     IRON STUDIES  Recent Labs   Lab Test 10/12/22  1126   IRON 59   *   IRONSAT 27   KAMILA 129       IMAGING:  All imaging studies reviewed by me.     Kenton Blake MD

## 2024-03-21 NOTE — PROGRESS NOTES
Neuro Interventional Progress Note    2024    Sheri Joyce is a 53 year old year old female patient admitted on 3/20/2024 with ADPKD and an enlarging right MCA bifurcation aneurysm detected during normal surveillance. She is admitted for elective embolization of the aneurysm for the prevention of aneurysm growth and/or rupture.    24 hour events:  On heparin gtt overnight for protection of the jailed temporal M2. Neurologically normal, though groin has been oozing; moderate sized non-tender ecchymosis.    24 Hour Vital Signs Summary:  Temperatures:  Current - Temp: 97.8  F (36.6  C); Max - Temp  Av.6  F (36.4  C)  Min: 97.3  F (36.3  C)  Max: 97.9  F (36.6  C)  Respiration range: Resp  Avg: 15.1  Min: 10  Max: 21  Pulse range: Pulse  Av.6  Min: 66  Max: 83  Blood pressure range: Systolic (24hrs), Av , Min:105 , Max:143   ; Diastolic (24hrs), Av, Min:71, Max:98    Pulse oximetry range: SpO2  Av.3 %  Min: 95 %  Max: 100 %    Current Medications:   amLODIPine  10 mg Oral QAM    aspirin  81 mg Oral Daily    cholecalciferol  10 mcg Oral Daily    famotidine  20 mg Oral Every Other Day    polyethylene glycol  17 g Oral QAM    sennosides  1 tablet Oral At Bedtime    simethicone  80 mg Oral Daily    sodium chloride (PF)  3 mL Intracatheter Q8H    ticagrelor  60 mg Oral BID       PRN Medications:  acetaminophen, glucose **OR** dextrose **OR** glucagon, lidocaine 4%, lidocaine (buffered or not buffered), - MEDICATION INSTRUCTIONS -, sennosides, sodium chloride (PF)    Infusions:   lactated ringers 5 mL/hr at 24 2100    - MEDICATION INSTRUCTIONS -         Allergies   Allergen Reactions    Bactrim [Sulfamethoxazole W/Trimethoprim] Itching    Oxycodone-Acetaminophen Nausea and Vomiting    Seasonal Allergies     Pcn [Penicillin G]        Physical Examination:  Right groin puncture site nontender, flat, oozing when dressing removed, hip is ecchymotic.  Awake, alert, attentive, oriented to  self, time, place, and circumstance. Language is fluent and coherent with intact comprehension of complex and crossed commands, naming, and repetition. No visuospatial hemineglect.  VFF, PERRL,EOMI, facial sensation intact, face symmetric, tongue midline, no dysarthria.  No drift in 4/4 extremities, no abnormal movements  Sensation intact to light touch in 4/4 extremities. No hemisensory neglect.  No ataxia or dysmetria to finger-nose-finger or heel-to-shin testing  Gait not tested    Labs/Studies:  Recent Labs   Lab Test 03/21/24  0608 03/21/24  0126 03/20/24 2025 03/20/24  1720 03/20/24  1252 03/20/24  1149 02/27/24  1127     --   --   --  138  --  138   POTASSIUM 3.4  --   --   --  3.8  --  3.3*   CHLORIDE 105  --   --   --  106  --  100   CO2 17*  --   --   --  18*  --  25   ANIONGAP 15  --   --   --  14  --  13   * 139* 152*  --  99   < > 110*   BUN 43.2*  --   --   --  46.1*  --  45.2*   CR 4.95*  --   --   --  5.33*  --  5.91*   ADRIEN 8.3*  --   --   --  9.0  --  9.4   WBC 12.4*  --   --  8.6 9.5  --  6.8   RBC 3.54*  --   --  4.08 4.34  --  4.47   HGB 11.1*  --   --  12.6 13.2  --  13.7     --   --  196 219  --  201    < > = values in this interval not displayed.       Recent Labs   Lab Test 03/20/24  1252 12/08/22  0910   INR 1.09 0.97   PTT 35 35         No lab results found in last 7 days.    Imaging:  No new imaging    Assessment/Plan  Right MCA bifurcation aneurysm s/p stent-assisted WEB embolization   Continue ASA 81 mg daily   Was only taking ticagrelor 60 mg daily; therapeutic yesterday but pharmacodynamics necessitate BID dosing. Has been on BID dosing here, will recheck P2Y12 this afternoon and determine long term dose.   Stop heparin    2. Groin bleeding- injected lido+epi, heparin stopped, will continue to monitor    3. ESRD on PD- Nephro consulted, did not run last night, will discuss with them if she needs to run tonight, if she stays.    The patient was discussed with the  attending, Dr. Hayes.    Janya Richards MD  Endovascular Surgical Neuroradiology Fellow, PGY-7  [phone]

## 2024-03-21 NOTE — PLAN OF CARE
Goal Outcome Evaluation:      Plan of Care Reviewed With: patient    Overall Patient Progress: improvingOverall Patient Progress: improving    Major Shift Events:  Hemodynamically stable.  Neuro intact.  Right groin with dressing dry and intact.  Ecchymosis.  Pain noted when site touched.  Heparin drip titrated per rn directed protocol.  Heparin xa >1.1 last evening and drip held x1 hr and drip restarted at 1000 units/hr IV.  Awaiting recheck results from 0620.  Brilinta dose given as ordered.  Tao remained in overnight as bedrest associated with right groin bleeding.  Taking good po.  No PD overnight.  Voiding 175-300 ml of pink urine every 2 hours. Complained of headache this AM related to lack of sleep and no good diet overnight.  Plan: continue to monitor and treat per goals of therapy.  Offer support as able to patient and family as able.  Increase activity as tolerated.  Monitor groin site and treat appropriately.   For vital signs and complete assessments, please see documentation flowsheets.

## 2024-03-21 NOTE — PROGRESS NOTES
Admitted/transferred from: PACU s/p WEB embolization of aneurysm  Reason for admission/transfer: post procedural monitoring   2 RN skin assessment: completed by isamar flores and devorah zee rn  Result of skin assessment and interventions/actions: scatter bruising.  Right groin puncture site with dressing in place  Height, weight, drug calc weight: Done  Patient belongings (see Flowsheet)  MDRO education added to care planN/A    Patient with pivot transfer to bed on arrival from PACU.  Right groin puncture site with dressing which is clean, dry and intact.  CMS warm, normal pulses DP and PT.  Called to room at approximately 2040 and stating she believed that she was bleeding.  Linens removed and noted saturated right groin puncture site dressing. Manual pressure administered immediately and KRAIG KING notified. Manual pressure held x30 minutes. Orders received to redress and apply sand bag and monitor.  Noted to have continued bleeding from site and MD re-notified.  Femostop placed at approximately 2130.  Pressure at 35.  Femostop remained in place for almost 3 hours and tension released and noted to have bleeding at site immediately following removal of femostop.  MDs notified and NCC MD to bedside to evaluate.  Dressed with stat seal, quick clot and tegaderm dressing.  No noted changes ever present in pulses or CMS.  Order for additional 30 minutes of manual pressure and completed.  Dressing taken down following manual pressure and with removal of stat seal noted to have bleeding resume.  MD notified.  Stat seal, quick clot and tegaderm dressing replaced.  Continued monitoring of site and bedrest.  Please see flowsheets for frequent assessments.  ?

## 2024-03-21 NOTE — PROGRESS NOTES
Discharged to: home @ 8851  Belongings: sent with patient  AVS (After Visit Summary) discussed with: patient

## 2024-03-21 NOTE — PHARMACY-ADMISSION MEDICATION HISTORY
Pharmacist Admission Medication History    Admission medication history is complete. The information provided in this note is only as accurate as the sources available at the time of the update.    Information Source(s): Patient and CareEverywhere/SureScripts via in-person    Pertinent Information:   Patient was a good historian and reports last home doses taken 3/18/24-3/20/24.  Per SureScripts, no recent fill history for cephalexin. Patient reports she has a supply, and was instructed to take only as needed (prophylaxis) for suspected contamination to peritoneal dialysis catheter before coming in to the hospital for further inspection.    Changes made to PTA medication list:  Added: None  Deleted: Chantix (1 mg).  Changed:   Cephalexin: previously take 1 capsule by mouth daily.  Famotidine: previously 40 mg tablets - Take 40 mg by mouth daily.  Senna: previously take 1 tablet by mouth daily.  Simethicone: previously scheduled.  Tylenol PM: previously take 1 tablet by mouth nightly as needed for sleep.    Allergies reviewed with patient and updates made in EHR:  Assessed by RN on 3/20/2024.    Medication History Completed By: Robert Kang [facility] 3/21/2024 10:15 AM    Prior to Admission medications    Medication Sig Last Dose Taking? Auth Provider Long Term End Date   amLODIPine (NORVASC) 10 MG tablet Take 1 tablet (10 mg) by mouth daily 3/20/2024 at AM Yes Jayne Davis MD Yes    aspirin 81 MG EC tablet Begin taking 1 tab daily 5 days prior to procedure 3/20/2024 at AM Yes Jesus Hayes MD     cephALEXin (KEFLEX) 250 MG capsule Take 250 mg by mouth daily as needed  Yes Unknown, Entered By History     diphenhydrAMINE-acetaminophen (TYLENOL PM)  MG tablet Take 2 tablets by mouth nightly as needed for sleep 3/19/2024 at PM Yes Reported, Patient     famotidine (PEPCID) 10 MG tablet Take 10 mg by mouth daily as needed for heartburn 3/18/2024 at PM Yes Reported, Patient     medical cannabis  (Patient's own supply) 1 Dose See Admin Instructions (The purpose of this order is to document that the patient reports taking medical cannabis.  This is not a prescription, and is not used to certify that the patient has a qualifying medical condition.)    Daily smoking 3/19/2024 at PM Yes Reported, Patient     polyethylene glycol (MIRALAX) 17 GM/Dose powder Take 17 g by mouth daily 3/20/2024 at AM Yes Bryce Lawrence MD     sennosides (SENOKOT) 8.6 MG tablet Take 2 tablets by mouth at bedtime 3/19/2024 at PM Yes Unknown, Entered By History     simethicone (MYLICON) 80 MG chewable tablet Take 80 mg by mouth daily as needed for cramping or flatulence Past Week Yes Reported, Patient     ticagrelor (BRILINTA) 60 MG tablet Begin taking 1 tab twice daily 5 days prior to procedure 3/20/2024 at AM Yes Jesus Hayes MD Yes    Vitamin D, Cholecalciferol, 10 MCG (400 UNIT) TABS Take 1 tablet by mouth daily 3/19/2024 at AM Yes Reported, Patient          Lynn Person  (RN)  2018 19:47:29 Carlene Teague  (NP)  2018 17:57:39

## 2024-03-22 ENCOUNTER — PATIENT OUTREACH (OUTPATIENT)
Dept: NEUROLOGY | Facility: CLINIC | Age: 54
End: 2024-03-22
Payer: MEDICARE

## 2024-03-22 NOTE — PROGRESS NOTES
Endovascular Surgical Neuroradiology - Post Discharge Call    Admit: 3/20/24    Discharge: 3/21/24    Facility: [facility]    MD/Service: Dr. Hayes/TIFFANY    Procedure Diagnosis:  Right MCA bifurcation aneurysm     Procedure(s):   Endovascular stent-assisted WEB embolization of an intracranial aneurysm (right femoral artery puncture)     Findings:  4.5 x 3 mm right MCA bifurcation aneurysm s/p 6 x 2 mm WEB SL placement and LVIS 2.5 x 23 mm stent placement from the frontal M2 extending proximally into the M1 segment     Plan:  [] Follow-up with Dr. Hayes in 1 month  [] Continue aspirin and Brilinta    Spoke with patient. Doing well. Taking it easy. Denies bleeding, drainage, pain, swelling, warmth, redness at puncture site. Bruising is lighter in color. Eating and drinking ok. Taking medications as prescribed.     Held video visit on 4/16/24 at 0930. Message sent to scheduling. Patient aware of the plan and in agreement.     Patient has my contact information and was encouraged to call with questions/concerns.     Renetta Nazario RN 3/22/2024 12:51 PM

## 2024-04-02 ENCOUNTER — DOCUMENTATION ONLY (OUTPATIENT)
Dept: OTHER | Facility: CLINIC | Age: 54
End: 2024-04-02
Payer: MEDICARE

## 2024-04-16 ENCOUNTER — VIRTUAL VISIT (OUTPATIENT)
Dept: NEUROSURGERY | Facility: CLINIC | Age: 54
End: 2024-04-16
Payer: MEDICARE

## 2024-04-16 DIAGNOSIS — I67.1 CEREBRAL ANEURYSM, NONRUPTURED: ICD-10-CM

## 2024-04-16 PROCEDURE — 99213 OFFICE O/P EST LOW 20 MIN: CPT | Mod: 95 | Performed by: RADIOLOGY

## 2024-04-16 RX ORDER — ASPIRIN 81 MG/1
81 TABLET ORAL DAILY
Qty: 60 TABLET | Refills: 0 | Status: SHIPPED | OUTPATIENT
Start: 2024-04-16

## 2024-04-16 ASSESSMENT — PAIN SCALES - GENERAL: PAINLEVEL: NO PAIN (0)

## 2024-04-16 NOTE — PATIENT INSTRUCTIONS
We will contact you closer to the 6 month monica to set up your angiogram.     Stroke & Endovascular RN Care Coordinators:    Angelica Hayes, RN, BSN  Renetta Nazario, RN, CNRN, SCRN    If you have any questions please contact the RN Care Coordinators at [phone], option 1.     After business hours call the  at [phone] and have the Neuro-Interventional Fellow paged.    Thank you for choosing [facility] for your health care needs.

## 2024-04-16 NOTE — LETTER
4/16/2024       RE: Sheri Joyce  [address]  [place] [address]         Dear Colleague,    Thank you for referring your patient, Sheri Joyce, to the [facility] at [facility]. Please see a copy of my visit note below.                                                                         [facility]  [address]  [address]  [place] [address]  Phone: [phone]  Fax: [phone]    Neuro-interventional clinic progress note:    Reason for clinic visit: Right MCA aneurysm      History of present Illness: Sheri Joyce is a 52-year-old female patient with history of autosomal dominant PKD, complicated by ESRD on HD, hypertension, and tobacco abuse who presents for evaluation of a right MCA trifurcation 5 mm saccular aneurysm.  She underwent MRA screening for aneurysms due to her PKD in 2005, and this lesion was not present at that time.  Again, imaging was obtained for aneurysm screening, in the context of preparation for kidney transplant.  Her blood pressure is well controlled.  She has quit smoking on a number of occasions and most recently restarted 2 weeks ago, but she is starting Chantix and intends to quit shortly.  She has no personal or family history of other brain aneurysm, or subarachnoid hemorrhage.    She is from [place], [place] and is [facility], she is on dialysis for ESRD - on PD everyday.   She hasn't had the kidney transplant yet she isn't sure when it was going to get done, she has to undergo dental in order to proceed, she has some broken teeth. She is inactive on the transplant list.   She quit smoking in September. SBPs 120-130/84    Interval history: She underwent web placement in March 2024 and presents for follow-up clinic visit. She has been doing good she feels the same, no new symptoms.     Past Medical History:  Past Medical  History:   Diagnosis Date    Anxiety     Brain aneurysm     Chronic kidney disease     Contraception 2009    Depression     Former tobacco use     Hypertension     Liver disease     PKD (polycystic kidney disease)     PONV (postoperative nausea and vomiting)     Thyroid disease     Varicosities        Past Surgical History:  Past Surgical History:   Procedure Laterality Date    COLONOSCOPY N/A 2023    Procedure: Colonoscopy;  Surgeon: Lizzie Munoz MD;  Location: [facility] GI    IR CAROTID CEREBRAL ANGIOGRAM BILATERAL  3/20/2024    LAPAROSCOPIC DECORTICATION CYST RENAL  2006    LAPAROSCOPIC INSERTION CATHETER PERITONEAL DIALYSIS Right 2023    Procedure: INSERTION, CATHETER, DIALYSIS, PERITONEAL, LAPAROSCOPIC;  Surgeon: Juanita Hagen MD;  Location: [facility] OR    MYRINGOTOMY, INSERT TUBE(S), ADENOIDECTOMY, COMBINED      PE TUBES  age 13    SURGICAL HISTORY OF -   2005    kidney surgery for cyst removal       Social History:  Social History     Socioeconomic History    Marital status:      Spouse name: Not on file    Number of children: Not on file    Years of education: Not on file    Highest education level: Not on file   Occupational History    Not on file   Tobacco Use    Smoking status: Some Days     Current packs/day: 0.00     Average packs/day: 0.5 packs/day for 10.0 years (5.0 ttl pk-yrs)     Types: Cigarettes     Start date: 10/17/2012     Last attempt to quit: 10/17/2022     Years since quittin.4     Passive exposure: Current    Smokeless tobacco: Never   Vaping Use    Vaping status: Never Used   Substance and Sexual Activity    Alcohol use: Not Currently     Comment: Sober May 2023    Drug use: Yes     Types: Marijuana     Comment: Smokes once daily to induce appetite.    Sexual activity: Yes     Partners: Male     Birth control/protection: None   Other Topics Concern    Parent/sibling w/ CABG, MI or angioplasty before 65F 55M? No   Social History Narrative    **  Merged History Encounter **         Dairy/d 2 servings/d.     Caffeine 3 servings/d    Exercise 5-7 x week walking    Sunscreen used - Yes    Seatbelts used - Yes    Working smoke/CO detectors in the home - NO    Guns stored in the home - No    Self Breast Exams - Yes    Self Testicular Exam - NA    Eye Exam up to date - Yes    Dental Exam up to date - No    Pap Smear up to date - Yes    Mammogram up to date - No    PSA up to date - NA    Dexa Scan up to date - NA    Flex Sig / Colonoscopy up to date - NA    Immunizations up to date - No    Abuse: Current or Past(Physical, Sexual or Emotional)- No    Do you feel safe in your environment - Yes    HONG KOCH LPN.    06/02/06         Social Determinants of Health     Financial Resource Strain: Not on file   Food Insecurity: Not on file   Transportation Needs: Not on file   Physical Activity: Not on file   Stress: Not on file   Social Connections: Not on file   Interpersonal Safety: Not on file   Housing Stability: Not on file       Family History:  Family History   Problem Relation Age of Onset    Lung Cancer Mother         lung    Hypertension Father     Breast Cancer Maternal Aunt     Cerebrovascular Disease Paternal Aunt 70    Breast Cancer Cousin     Breast Cancer Cousin     Diabetes No family hx of     C.A.D. No family hx of     Cancer - colorectal No family hx of     Prostate Cancer No family hx of     Anesthesia Reaction No family hx of     Venous thrombosis No family hx of        Home Medications:  Current Outpatient Medications   Medication Sig Dispense Refill    amLODIPine (NORVASC) 10 MG tablet Take 1 tablet (10 mg) by mouth daily 90 tablet 3    aspirin 81 MG EC tablet Take 1 tablet (81 mg) by mouth daily      cephALEXin (KEFLEX) 250 MG capsule Take 250 mg by mouth daily as needed      diphenhydrAMINE-acetaminophen (TYLENOL PM)  MG tablet Take 2 tablets by mouth nightly as needed for sleep      famotidine (PEPCID) 10 MG tablet Take 10 mg by mouth daily  as needed for heartburn      medical cannabis (Patient's own supply) 1 Dose See Admin Instructions (The purpose of this order is to document that the patient reports taking medical cannabis.  This is not a prescription, and is not used to certify that the patient has a qualifying medical condition.)    Daily smoking      polyethylene glycol (MIRALAX) 17 GM/Dose powder Take 17 g by mouth daily 510 g 1    sennosides (SENOKOT) 8.6 MG tablet Take 2 tablets by mouth at bedtime      simethicone (MYLICON) 80 MG chewable tablet Take 80 mg by mouth daily as needed for cramping or flatulence      ticagrelor (BRILINTA) 60 MG tablet Take 1 tablet (60 mg) by mouth 2 times daily      Vitamin D, Cholecalciferol, 10 MCG (400 UNIT) TABS Take 1 tablet by mouth daily       No current facility-administered medications for this visit.       Allergies:  Allergies   Allergen Reactions    Bactrim [Sulfamethoxazole W/Trimethoprim] Itching    Oxycodone-Acetaminophen Nausea and Vomiting    Seasonal Allergies     Pcn [Penicillin G]        Physical Examination:  Video visit  Awake, alert, attentive, fully oriented, language is fluent and coherent  EOMI, face symmetric, no dysarthria,  Moves upper ext equally.     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  While her risk of the aneurysm incidence is higher than the general population, her risk of rupture is comparable to that of other patients with incidentally noted brain aneurysms.  5-year rupture risk of the lesion of this size, in this location is less than 1%.  On most recent imaging her aneurysm had grown in size from 5mm to 5.5mm, given the increase in size and her young age intervention was recommended and she underwent web embolization.        Plan:  Repeat 6month DSA   Continue aspirin,  Brilinta for another 2 months    Patient seen and discussed with the attending, Dr. Hayes.  Brenda Haskins MD  Endovascular Surgical Neuroradiology Fellow  [facility]  [phone]    Endovascular Surgical Neuroradiology staff is Dr. Hayes    Attestation signed by Jesus Hayes MD at 4/17/2024 12:10 PM:  I have personally spoken with the patient and I agree with the note by Dr. Haskins dated 4/16/24        Again, thank you for allowing me to participate in the care of your patient.      Sincerely,    Jesus Hayes MD

## 2024-04-16 NOTE — PROGRESS NOTES
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  She underwent MRA screening for aneurysms due to her PKD in 2005, and this lesion was not present at that time.  Again, imaging was obtained for aneurysm screening, in the context of preparation for kidney transplant.  Her blood pressure is well controlled.  She has quit smoking on a number of occasions and most recently restarted 2 weeks ago, but she is starting Chantix and intends to quit shortly.  She has no personal or family history of other brain aneurysm, or subarachnoid hemorrhage.    She is from [place], [place] and is [facility], she is on dialysis for ESRD - on PD everyday.   She hasn't had the kidney transplant yet she isn't sure when it was going to get done, she has to undergo dental in order to proceed, she has some broken teeth. She is inactive on the transplant list.   She quit smoking in September. SBPs 120-130/84    Interval history: She underwent web placement in March 2024 and presents for follow-up clinic visit. She has been doing good she feels the same, no new symptoms.     Past Medical History:  Past Medical History:   Diagnosis Date    Anxiety     Brain aneurysm     Chronic kidney disease     Contraception 03/19/2009    Depression     Former tobacco use     Hypertension     Liver disease     PKD (polycystic kidney disease)     PONV (postoperative nausea and vomiting)     Thyroid disease     Varicosities        Past Surgical  History:  Past Surgical History:   Procedure Laterality Date    COLONOSCOPY N/A 2023    Procedure: Colonoscopy;  Surgeon: Lizzie Munoz MD;  Location: [facility] GI    IR CAROTID CEREBRAL ANGIOGRAM BILATERAL  3/20/2024    LAPAROSCOPIC DECORTICATION CYST RENAL  2006    LAPAROSCOPIC INSERTION CATHETER PERITONEAL DIALYSIS Right 2023    Procedure: INSERTION, CATHETER, DIALYSIS, PERITONEAL, LAPAROSCOPIC;  Surgeon: Juanita Hagen MD;  Location: [facility] OR    MYRINGOTOMY, INSERT TUBE(S), ADENOIDECTOMY, COMBINED      PE TUBES  age 13    SURGICAL HISTORY OF -   2005    kidney surgery for cyst removal       Social History:  Social History     Socioeconomic History    Marital status:      Spouse name: Not on file    Number of children: Not on file    Years of education: Not on file    Highest education level: Not on file   Occupational History    Not on file   Tobacco Use    Smoking status: Some Days     Current packs/day: 0.00     Average packs/day: 0.5 packs/day for 10.0 years (5.0 ttl pk-yrs)     Types: Cigarettes     Start date: 10/17/2012     Last attempt to quit: 10/17/2022     Years since quittin.4     Passive exposure: Current    Smokeless tobacco: Never   Vaping Use    Vaping status: Never Used   Substance and Sexual Activity    Alcohol use: Not Currently     Comment: Sober May 2023    Drug use: Yes     Types: Marijuana     Comment: Smokes once daily to induce appetite.    Sexual activity: Yes     Partners: Male     Birth control/protection: None   Other Topics Concern    Parent/sibling w/ CABG, MI or angioplasty before 65F 55M? No   Social History Narrative    ** Merged History Encounter **         Dairy/d 2 servings/d.     Caffeine 3 servings/d    Exercise 5-7 x week walking    Sunscreen used - Yes    Seatbelts used - Yes    Working smoke/CO detectors in the home - NO    Guns stored in the home - No    Self Breast Exams - Yes    Self Testicular Exam - NA    Eye Exam up to date - Yes     Dental Exam up to date - No    Pap Smear up to date - Yes    Mammogram up to date - No    PSA up to date - NA    Dexa Scan up to date - NA    Flex Sig / Colonoscopy up to date - NA    Immunizations up to date - No    Abuse: Current or Past(Physical, Sexual or Emotional)- No    Do you feel safe in your environment - Yes    HONG HALLAnselmo BRIAN.    06/02/06         Social Determinants of Health     Financial Resource Strain: Not on file   Food Insecurity: Not on file   Transportation Needs: Not on file   Physical Activity: Not on file   Stress: Not on file   Social Connections: Not on file   Interpersonal Safety: Not on file   Housing Stability: Not on file       Family History:  Family History   Problem Relation Age of Onset    Lung Cancer Mother         lung    Hypertension Father     Breast Cancer Maternal Aunt     Cerebrovascular Disease Paternal Aunt 70    Breast Cancer Cousin     Breast Cancer Cousin     Diabetes No family hx of     C.A.D. No family hx of     Cancer - colorectal No family hx of     Prostate Cancer No family hx of     Anesthesia Reaction No family hx of     Venous thrombosis No family hx of        Home Medications:  Current Outpatient Medications   Medication Sig Dispense Refill    amLODIPine (NORVASC) 10 MG tablet Take 1 tablet (10 mg) by mouth daily 90 tablet 3    aspirin 81 MG EC tablet Take 1 tablet (81 mg) by mouth daily      cephALEXin (KEFLEX) 250 MG capsule Take 250 mg by mouth daily as needed      diphenhydrAMINE-acetaminophen (TYLENOL PM)  MG tablet Take 2 tablets by mouth nightly as needed for sleep      famotidine (PEPCID) 10 MG tablet Take 10 mg by mouth daily as needed for heartburn      medical cannabis (Patient's own supply) 1 Dose See Admin Instructions (The purpose of this order is to document that the patient reports taking medical cannabis.  This is not a prescription, and is not used to certify that the patient has a qualifying medical condition.)    Daily smoking       polyethylene glycol (MIRALAX) 17 GM/Dose powder Take 17 g by mouth daily 510 g 1    sennosides (SENOKOT) 8.6 MG tablet Take 2 tablets by mouth at bedtime      simethicone (MYLICON) 80 MG chewable tablet Take 80 mg by mouth daily as needed for cramping or flatulence      ticagrelor (BRILINTA) 60 MG tablet Take 1 tablet (60 mg) by mouth 2 times daily      Vitamin D, Cholecalciferol, 10 MCG (400 UNIT) TABS Take 1 tablet by mouth daily       No current facility-administered medications for this visit.       Allergies:  Allergies   Allergen Reactions    Bactrim [Sulfamethoxazole W/Trimethoprim] Itching    Oxycodone-Acetaminophen Nausea and Vomiting    Seasonal Allergies     Pcn [Penicillin G]        Physical Examination:  Video visit  Awake, alert, attentive, fully oriented, language is fluent and coherent  EOMI, face symmetric, no dysarthria,  Moves upper ext equally.     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  While her risk of the aneurysm incidence is higher than the general population, her risk of rupture is comparable to that of other patients with incidentally noted brain aneurysms.  5-year rupture risk of the lesion of this size, in this location is less than 1%.  On most recent imaging her aneurysm had grown in size from 5mm to 5.5mm, given the increase in size and her young age intervention was recommended and she underwent web embolization.        Plan:  Repeat 6month DSA   Continue aspirin, Brilinta for another 2 months    Patient seen and discussed with the attending, Dr. Hayes.  Brenda Haskins MD  Endovascular Surgical Neuroradiology Fellow  [facility]  [phone]    Endovascular Surgical Neuroradiology staff is Dr. Hayes

## 2024-04-16 NOTE — NURSING NOTE
Is the patient currently in the state of [place]? YES    Visit mode:VIDEO    If the visit is dropped, the patient can be reconnected by: VIDEO VISIT: Text to cell phone:   Telephone Information:   Mobile [phone]       Will anyone else be joining the visit? NO  (If patient encounters technical issues they should call [phone] :[id number])    How would you like to obtain your AVS? MyChart    Are changes needed to the allergy or medication list? No    Are refills needed on medications prescribed by this physician? YES    Reason for visit: Follow Up    Yulisa RUIZ

## 2024-04-16 NOTE — PROGRESS NOTES
"Virtual Visit Details    Type of service:  Video Visit   Video Start Time: {video visit start/end time for provider to select:210825}  Video End Time:{video visit start/end time for provider to select:569874}    Originating Location (pt. Location): {video visit patient location:693756::\"Home\"}  {PROVIDER LOCATION On-site should be selected for visits conducted from your clinic location or adjoining [facility] hospital, academic office, or other nearby [facility] building. Off-site should be selected for all other provider locations, including home:908883}  Distant Location (provider location):  {virtual location provider:168842}  Platform used for Video Visit: {Virtual Visit Platforms:363681::\"[employer]\"}    "

## 2024-04-24 ENCOUNTER — DOCUMENTATION ONLY (OUTPATIENT)
Dept: TRANSPLANT | Facility: CLINIC | Age: 54
End: 2024-04-24
Payer: MEDICARE

## 2024-04-25 DIAGNOSIS — Z12.31 ENCOUNTER FOR SCREENING MAMMOGRAM FOR BREAST CANCER: Primary | ICD-10-CM

## 2024-04-26 ENCOUNTER — TELEPHONE (OUTPATIENT)
Dept: TRANSPLANT | Facility: CLINIC | Age: 54
End: 2024-04-26
Payer: MEDICARE

## 2024-04-26 NOTE — TELEPHONE ENCOUNTER
Called pt to get update on dental and caregiver plan. Left VM with direct line for return call.     
English

## 2024-06-14 DIAGNOSIS — I67.1 CEREBRAL ANEURYSM, NONRUPTURED: ICD-10-CM

## 2024-06-14 RX ORDER — TICAGRELOR 60 MG/1
TABLET ORAL
Qty: 120 TABLET | Refills: 0 | OUTPATIENT
Start: 2024-06-14

## 2024-06-14 NOTE — TELEPHONE ENCOUNTER
No refill required per Dr. Hayes note from 4/16/24,   Plan:  Repeat 6month DSA   Continue aspirin, Brilinta for another 2 months  Patient no longer needs to take.   Angelica Hayes RN 6/14/2024 8:16 AM

## 2024-06-16 ENCOUNTER — PATIENT OUTREACH (OUTPATIENT)
Dept: CARE COORDINATION | Facility: CLINIC | Age: 54
End: 2024-06-16
Payer: MEDICARE

## 2024-07-25 DIAGNOSIS — Z72.0 TOBACCO ABUSE: ICD-10-CM

## 2024-07-25 DIAGNOSIS — E87.6 HYPOKALEMIA: Primary | ICD-10-CM

## 2024-07-25 DIAGNOSIS — R21 SKIN RASH: ICD-10-CM

## 2024-07-25 RX ORDER — VARENICLINE TARTRATE 0.5 MG/1
0.5 TABLET, FILM COATED ORAL DAILY
Qty: 90 TABLET | Refills: 0 | Status: SHIPPED | OUTPATIENT
Start: 2024-07-25

## 2024-07-25 RX ORDER — POTASSIUM CHLORIDE 1500 MG/1
20 TABLET, EXTENDED RELEASE ORAL DAILY
Qty: 90 TABLET | Refills: 3 | Status: SHIPPED | OUTPATIENT
Start: 2024-07-25

## 2024-08-12 DIAGNOSIS — I67.1 CEREBRAL ANEURYSM, NONRUPTURED: Primary | ICD-10-CM

## 2024-08-13 ENCOUNTER — TELEPHONE (OUTPATIENT)
Dept: RADIOLOGY | Facility: CLINIC | Age: 54
End: 2024-08-13
Payer: MEDICARE

## 2024-08-13 NOTE — TELEPHONE ENCOUNTER
I called patient in regards IR procedure with Dr. Hayes. I was unable to reach patient, but a voicemail and a call back number were left on patients voicemail.    Mateusz Garza on 8/13/2024 at 11:08 AM

## 2024-08-20 NOTE — TELEPHONE ENCOUNTER
I called patient  in regards to IR procedure with Dr. Hayes    Procedure Date: 09/19/2024    Arrival: 0830    Loction: [facility] IR Suite    Anesthesia Type: IV Sedation    Notes:         Mateusz Garza on 8/20/2024 at 10:31 AM

## 2024-09-04 ENCOUNTER — PATIENT OUTREACH (OUTPATIENT)
Dept: NEUROSURGERY | Facility: CLINIC | Age: 54
End: 2024-09-04
Payer: MEDICARE

## 2024-09-04 NOTE — PATIENT INSTRUCTIONS
You are scheduled for a Diagnostic Cerebral Angiogram with Dr. Hayes on 9/19/24. Arrival Time: 8:30 am. Procedure Time: 10:00 am.    Please follow these instructions:    * Check in at the [facility] [place], operated by [facility] Interventional Radiology. The address is [facility] [address] [address]. Enter at door 2, closest to [address]. The phone number is [phone].     * Nothing to eat for 8 hours prior to arrival time. You may drink clear liquids (includes water, Jell-O, clear broth, apple juice or any non-carbonated beverage that you can see through) up until 2 hours prior to arrival time.     * You may take your medications with a sip of water the morning of the procedure.     * You will be discharged the same day. You must have a  home and someone that can stay with you through the night.     PLEASE NOTE our COVID-19 visitors policy: Adult surgical and procedural patients can have two visitors throughout the surgery process. The two visitors may include children of any age; please note, children cannot stay in the waiting room alone.    All discharge instructions will be given to the  or volunteer. Documentation for the post-operative plan will be given to the patient and . Patients are required to have someone to stay with them for 24 hours after their procedure.    If you have questions regarding your procedure, please contact me at [phone], option 1.    If you need to cancel, reschedule or have procedure scheduling related questions, please call Neuroendovascular IR Procedure Scheduling at [phone].    Thank you,  Renetta Nazario, RN, CNRN, SCRN  Angelica Hayes, RN, BSN  Stroke & Neuroendovascular Care Coordinator    Cerebral Angiogram - What to Expect      You are scheduled for an angiogram, which is a procedure that uses x-rays to view the arteries (blood vessels that carry blood from the heart out to the body).     After you arrive, the nursing staff will take  a short history and assessment before the procedure begins. Your physician will explain the procedure to you and your family, including the possible risks and side effects.  Be sure to ask questions and address any concerns you may have. You will then be asked to sign a consent form for the procedure.     During the procedure a small tube or catheter will be placed into one of your arteries (either the femoral artery in the groin, or the radial artery in the arm)  and maneuvered to the specific artery being studied.  Contrast medium (x-ray dye) will be injected into the blood vessel and x-rays will be taken of the area.    Once the procedure is completed the catheter will be removed and pressure held at the puncture site for 20 to 30 minutes to make sure the puncture site seals. An internal closure device may be used to secure the arterial puncture as well.     During the exam, you are routinely monitored by a heart monitor and an oxygen saturation monitor that lets us know how well you are breathing.  A blood pressure cuff will be on your arm.  An IV is started to provide you with medications and IV fluids during the procedure.    After the exam you will need to be on complete bed rest for 2 to 6 hours.  The nurse will monitor your vital signs, puncture site, pulses and temperature of the area that was punctured.     After you are discharged do not drive until the next morning and an adult must be with you until then. You may resume your normal activities the day after the procedure.  Do not do any strenuous exercise or lifting for 48 hours after the procedure.     Preparation:   Laboratory tests will be obtained by your doctor the day of the exam (Basic Metabolic Panel, CBCP, PTT and INR).  Someone will need to drive you to and from the hospital.  Be sure to have someone who can stay with you through the night.   If you are allergic to x-ray contrast or iodine, contact your doctor or the nurse coordinator prior to  the exam day for instructions.  If you are or may be pregnant contact your doctor or nurse coordinator prior to the day of the exam.  If you have diabetes, check with your doctor or the RN Care Coordinator to see if your insulin should be adjusted for the exam.  If you are taking a medication called Glucophage, Glucovance, or Metformin; these medications need to be held the day of the exam and for approximately 48 hours following the procedure.   If you are taking Coumadin (to thin your blood) please contact the RN Care Coordinator at least 7 days before the exam for special instructions.  You should not have received barium (x-ray contrast) within 48 hours of this procedure.   Do not smoke for 24 hours prior to the procedure.  Do not eat for 8 hours prior to arrival time. You may drink clear liquids (water, ginger ale, etc) until 2 hours prior to arrival time.  You may brush your teeth and take your medications as directed with a sip of water.    Feel free to contact Renetta Nazario or Angelica Hayes, our RN Care Coordinators, at [phone] with any questions or concerns. After business hours, call the  at [phone] and have the Neuro-Interventional Fellow paged.

## 2024-09-04 NOTE — TELEPHONE ENCOUNTER
LVMM informing patient instructions sent via [employer]. Encouraged to reach out with questions.     Renetta Nazario RN 9/4/2024 9:54 AM

## 2024-09-18 NOTE — TELEPHONE ENCOUNTER
Spoke with patient and she is aware of procedure date and time.        Mateusz Garza on 9/18/2024 at 2:02 PM

## 2024-10-10 ENCOUNTER — PATIENT OUTREACH (OUTPATIENT)
Dept: NEUROSURGERY | Facility: CLINIC | Age: 54
End: 2024-10-10
Payer: MEDICARE

## 2024-10-10 NOTE — PATIENT INSTRUCTIONS
You are scheduled for a Diagnostic Cerebral Angiogram with Dr. Hayes on 10/24/24. Arrival Time: 6:30 am. Procedure Time: 8:00 am.    Please follow these instructions:    * [facility] in at the [facility] Interventional Rad[facility]. The address is [facility] [address] [address]. Enter at door 2, closest to [address]. The phone number is [phone].     * Nothing to eat for 8 hours prior to arrival time. You may drink clear liquids (includes water, Jell-O, clear broth, apple juice or any non-carbonated beverage that you can see through) up until 2 hours prior to arrival time.     * You may take your medications with a sip of water the morning of the procedure.     * You will be discharged the same day. You must have a  home and someone that can stay with you through the night.     PLEASE NOTE: Due to the IV fluid shortage please ensure you are well hydrated. Drink plenty of fluids leading into and after your procedure (64 oz daily, unless you have fluid restrictions).     COVID-19 visitors policy: Adult surgical and procedural patients can have two visitors throughout the surgery process. The two visitors may include children of any age; please note, children cannot stay in the waiting room alone.    All discharge instructions will be given to the  or volunteer. Documentation for the post-operative plan will be given to the patient and . Patients are required to have someone to stay with them for 24 hours after their procedure.    If you have questions regarding your procedure, please contact me at [phone], option 1.    If you need to cancel, reschedule or have procedure scheduling related questions, please call Neuroendovascular IR Procedure Scheduling at [phone].    Thank you,  Renetta Nazario, RN, CNRN, SCRN  Angelica Hayes, RN, BSN  Stroke & Neuroendovascular Care Coordinator    Cerebral Angiogram - What to Expect      You are scheduled for an  angiogram, which is a procedure that uses x-rays to view the arteries (blood vessels that carry blood from the heart out to the body).     After you arrive, the nursing staff will take a short history and assessment before the procedure begins. Your physician will explain the procedure to you and your family, including the possible risks and side effects.  Be sure to ask questions and address any concerns you may have. You will then be asked to sign a consent form for the procedure.     During the procedure a small tube or catheter will be placed into one of your arteries (either the femoral artery in the groin, or the radial artery in the arm)  and maneuvered to the specific artery being studied.  Contrast medium (x-ray dye) will be injected into the blood vessel and x-rays will be taken of the area.    Once the procedure is completed the catheter will be removed and pressure held at the puncture site for 20 to 30 minutes to make sure the puncture site seals. An internal closure device may be used to secure the arterial puncture as well.     During the exam, you are routinely monitored by a heart monitor and an oxygen saturation monitor that lets us know how well you are breathing.  A blood pressure cuff will be on your arm.  An IV is started to provide you with medications and IV fluids during the procedure.    After the exam you will need to be on complete bed rest for 2 to 6 hours.  The nurse will monitor your vital signs, puncture site, pulses and temperature of the area that was punctured.     After you are discharged do not drive until the next morning and an adult must be with you until then. You may resume your normal activities the day after the procedure.  Do not do any strenuous exercise or lifting for 48 hours after the procedure.     Preparation:   Laboratory tests will be obtained by your doctor the day of the exam (Basic Metabolic Panel, CBCP, PTT and INR).  Someone will need to drive you to and from  the hospital.  Be sure to have someone who can stay with you through the night.   If you are allergic to x-ray contrast or iodine, contact your doctor or the nurse coordinator prior to the exam day for instructions.  If you are or may be pregnant contact your doctor or nurse coordinator prior to the day of the exam.  If you have diabetes, check with your doctor or the RN Care Coordinator to see if your insulin should be adjusted for the exam.  If you are taking a medication called Glucophage, Glucovance, or Metformin; these medications need to be held the day of the exam and for approximately 48 hours following the procedure.   If you are taking Coumadin (to thin your blood) please contact the RN Care Coordinator at least 7 days before the exam for special instructions.  You should not have received barium (x-ray contrast) within 48 hours of this procedure.   Do not smoke for 24 hours prior to the procedure.  Do not eat for 8 hours prior to arrival time. You may drink clear liquids (water, ginger ale, etc) until 2 hours prior to arrival time.  You may brush your teeth and take your medications as directed with a sip of water.    Feel free to contact Renetta Nazario or Angelica Hayes, our RN Care Coordinators, at [phone] with any questions or concerns. After business hours, call the  at [phone] and have the Neuro-Interventional Fellow paged.

## 2024-10-10 NOTE — TELEPHONE ENCOUNTER
LVMM informing patient instructions sent via "[employer]. Encouraged to reach out with questions.    Renetta Nazario RN 10/10/2024 3:04 PM

## 2024-10-23 ENCOUNTER — TELEPHONE (OUTPATIENT)
Dept: RADIOLOGY | Facility: CLINIC | Age: 54
End: 2024-10-23
Payer: MEDICARE

## 2024-10-23 NOTE — TELEPHONE ENCOUNTER
Patient called IR regarding her procedure tomorrow 10/24 and requested it to be cancelled due to illness. Message was sent to Neurosurgery scheduling from [facility] relaying information.                   Mateusz Garza on 10/23/2024 at 10:41 AM

## 2024-10-28 NOTE — TELEPHONE ENCOUNTER
I called patient in regards IR procedure with Dr. Hayes. I was unable to reach patient, but a voicemail and a call back number were left on patients voicemail.    Mateusz Garza on 10/28/2024 at 12:52 PM

## 2024-10-29 NOTE — TELEPHONE ENCOUNTER
I called patient in regards IR procedure with Dr. Hayes. I was unable to reach patient, but a voicemail and a call back number were left on patients voicemail.    Mateusz Garza on 10/29/2024 at 3:39 PM

## 2024-10-30 ENCOUNTER — DOCUMENTATION ONLY (OUTPATIENT)
Dept: NEUROSURGERY | Facility: CLINIC | Age: 54
End: 2024-10-30
Payer: MEDICARE

## 2024-10-30 NOTE — PROGRESS NOTES
Per appointment with Dr. Hayes on 4/16/24 - schedule patient for diagnostic angiogram in 6 months. All attempts to schedule angio exhausted. No further attempts will be made at this time. Will await patient call.   Angelica Hayes RN 10/30/2024 6:47 AM

## 2024-12-19 ENCOUNTER — TELEPHONE (OUTPATIENT)
Dept: TRANSPLANT | Facility: CLINIC | Age: 54
End: 2024-12-19
Payer: MEDICARE

## 2024-12-19 NOTE — TELEPHONE ENCOUNTER
Called pt to check in on dental and rescheduling procedure w/ neurosurgery. Left VM with direct line for return call.

## 2024-12-24 DIAGNOSIS — I67.1 CEREBRAL ANEURYSM, NONRUPTURED: Primary | ICD-10-CM

## 2024-12-24 NOTE — PROGRESS NOTES
Per Dr. Hayes: We can just perform a MRA of the head instead of a diagnostic angiogram. From our perspective, she is cleared for transplant.     Orders placed. Message sent to scheduling.     Renetta Nazario RN 12/24/2024 9:59 AM

## 2024-12-26 ENCOUNTER — TELEPHONE (OUTPATIENT)
Dept: NEUROSURGERY | Facility: CLINIC | Age: 54
End: 2024-12-26
Payer: MEDICARE

## 2024-12-26 NOTE — TELEPHONE ENCOUNTER
Left Voicemail (1st Attempt) and Sent Mychart (1st Attempt) for the patient to call back and schedule the following:    Appointment type: Return Vascular Neurosurg  Provider: Freddy  Return date: next available  Specialty phone number: [phone]  Additional appointment(s) needed: MRA brain prior  Additonal Notes: Follow up/MRA prior    Zaynab Rae on 12/26/2024 at 3:27 PM

## 2024-12-26 NOTE — TELEPHONE ENCOUNTER
[facility] Call Center    Phone Message    May a detailed message be left on voicemail: yes , Mitalit is okay too per patient.    Reason for Call: Other: Patient called stating she attempted to schedule imaging appointment for her MRA, she states that the  asked for a number regarding an aneurysm clip. She states she does not know what this number is and is not sure if that is what she has. She is requesting a call back to discuss this so she can proceed with scheduling imaging.      Action Taken: Message routed to:  [facility] Neurosurgery    Travel Screening: Not Applicable     Date of Service:

## 2025-01-02 ENCOUNTER — TELEPHONE (OUTPATIENT)
Dept: NEUROSURGERY | Facility: CLINIC | Age: 55
End: 2025-01-02
Payer: MEDICARE

## 2025-01-02 NOTE — TELEPHONE ENCOUNTER
Left Voicemail (2nd Attempt) for the patient to call back and schedule the following:    Appointment type: Rtn vascular neurosurg - in person or virtual  Provider: Dr. Hayes  Return date: Next available after the MRA  Specialty phone number: [phone]  Additional appointment(s) needed: MRA prior  Additonal Notes: Cerebral aneurysm, nonruptured/ MRA prior

## 2025-01-06 ENCOUNTER — MYC MEDICAL ADVICE (OUTPATIENT)
Dept: NEUROSURGERY | Facility: CLINIC | Age: 55
End: 2025-01-06
Payer: MEDICARE

## 2025-01-06 DIAGNOSIS — F41.9 ANXIETY: Primary | ICD-10-CM

## 2025-01-06 RX ORDER — DIAZEPAM 2 MG/1
TABLET ORAL
Status: SHIPPED
Start: 2025-01-06

## 2025-01-15 DIAGNOSIS — N18.6 END-STAGE RENAL DISEASE ON PERITONEAL DIALYSIS (H): Primary | ICD-10-CM

## 2025-01-15 DIAGNOSIS — Z99.2 END-STAGE RENAL DISEASE ON PERITONEAL DIALYSIS (H): Primary | ICD-10-CM

## 2025-02-11 ENCOUNTER — ANCILLARY PROCEDURE (OUTPATIENT)
Dept: MAMMOGRAPHY | Facility: CLINIC | Age: 55
End: 2025-02-11
Attending: INTERNAL MEDICINE
Payer: MEDICARE

## 2025-02-11 ENCOUNTER — ANCILLARY PROCEDURE (OUTPATIENT)
Dept: MRI IMAGING | Facility: CLINIC | Age: 55
End: 2025-02-11
Attending: RADIOLOGY
Payer: MEDICARE

## 2025-02-11 DIAGNOSIS — I67.1 CEREBRAL ANEURYSM, NONRUPTURED: ICD-10-CM

## 2025-02-11 DIAGNOSIS — Z12.31 ENCOUNTER FOR SCREENING MAMMOGRAM FOR BREAST CANCER: ICD-10-CM

## 2025-02-11 PROCEDURE — 77067 SCR MAMMO BI INCL CAD: CPT | Mod: GC | Performed by: RADIOLOGY

## 2025-02-11 PROCEDURE — 70544 MR ANGIOGRAPHY HEAD W/O DYE: CPT | Performed by: STUDENT IN AN ORGANIZED HEALTH CARE EDUCATION/TRAINING PROGRAM

## 2025-02-11 PROCEDURE — 77063 BREAST TOMOSYNTHESIS BI: CPT | Mod: GC | Performed by: RADIOLOGY

## 2025-02-13 ENCOUNTER — TELEPHONE (OUTPATIENT)
Dept: RADIOLOGY | Facility: CLINIC | Age: 55
End: 2025-02-13
Payer: MEDICARE

## 2025-02-13 NOTE — TELEPHONE ENCOUNTER
Left Voicemail (1st Attempt) for patient to call [phone].    Please reschedule appointment on 2-19-25 with Jesus Hayes MD. Jesus Hayes MD is out of the clinic and is unable to see Sheri for this appointment. Ok to warm transfer to clinic for rescheduling assistance.     Provider has clinic times on hold 2-26--25 for patient to reschedule.

## 2025-02-20 NOTE — TELEPHONE ENCOUNTER
Left Voicemail (2ndAttempt) for patient to call [phone].     Please reschedule appointment on 2-19-25 with Jesus Hayes MD. Jesus Hayes MD is out of the clinic and is unable to see Sheri for this appointment. Ok to warm transfer to clinic for rescheduling assistance.      Provider has clinic times on hold 2-26--25 for patient to reschedule.

## 2025-03-04 ENCOUNTER — VIRTUAL VISIT (OUTPATIENT)
Dept: NEUROSURGERY | Facility: CLINIC | Age: 55
End: 2025-03-04
Attending: RADIOLOGY
Payer: MEDICARE

## 2025-03-04 VITALS — WEIGHT: 155 LBS | BODY MASS INDEX: 26.46 KG/M2 | HEIGHT: 64 IN

## 2025-03-04 DIAGNOSIS — I67.1 CEREBRAL ANEURYSM, NONRUPTURED: ICD-10-CM

## 2025-03-04 ASSESSMENT — PAIN SCALES - GENERAL: PAINLEVEL_OUTOF10: MILD PAIN (3)

## 2025-03-04 NOTE — PATIENT INSTRUCTIONS
Please follow-up with Dr. Hayes in 1 year with MRA prior.     Stroke & Endovascular RN Care Coordinators:    Angelica Hayes, RN, BSN  Renetta Nazario, RN, CNRN, SCRN    If you have any questions please contact the RN Care Coordinators at [phone], option 1.    After business hours call the  at [phone] and have the Neuro-Interventional Fellow paged.    Thank you for choosing [facility] for your health care needs.

## 2025-03-04 NOTE — NURSING NOTE
Current patient location: [address]  [facility] [address]    Is the patient currently in the state of [place]? YES    Visit mode: VIDEO    If the visit is dropped, the patient can be reconnected by:VIDEO VISIT: Text to cell phone:   Telephone Information:   Mobile [phone]       Will anyone else be joining the visit? NO  (If patient encounters technical issues they should call [phone])    Are changes needed to the allergy or medication list? Pt stated no changes to allergies and Pt stated no med changes    Are refills needed on medications prescribed by this physician? NO    Rooming Documentation:  Not applicable    Reason for visit: FAITH [facility]COST[facility]

## 2025-03-04 NOTE — LETTER
3/4/2025       RE: Sheri Joyce  [address]  [facility] [address]     Dear Colleague,    Thank you for referring your patient, Sheri Joyce, to the [facility] [place] [facility]. Please see a copy of my visit note below.                                                                         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  She underwent MRA screening for aneurysms due to her PKD in 2005, and this lesion was not present at that time.  Again, imaging was obtained for aneurysm screening, in the context of preparation for kidney transplant.  Her blood pressure is well controlled.  She has quit smoking on a number of occasions and most recently restarted 2 weeks ago, but she is starting Chantix and intends to quit shortly.  She has no personal or family history of other brain aneurysm, or subarachnoid hemorrhage.    She is from [place], [place] and is [facility], she is on dialysis for ESRD - on PD everyday.   She hasn't had the kidney transplant yet she isn't sure when it was going to get done, she has to undergo dental in order to proceed, she has some broken teeth. She is inactive on the transplant list.   She quit smoking in September. SBPs 120-130/84    Interval history: She underwent web placement in March 2024 and presents for follow-up clinic visit. She has been doing good she feels the same, no new symptoms.     Interval history:     She is doing well,  she has been doing well, her kidney function has improved. She denies any weakness, numbness, tingling episodes.     Past Medical History:  Past Medical History:   Diagnosis Date     Anxiety      Brain aneurysm      Chronic kidney disease      Contraception 2009     Depression      Former tobacco use      Hypertension      Liver disease      PKD (polycystic kidney disease)      PONV (postoperative nausea and vomiting)      Thyroid disease      Varicosities        Past Surgical History:  Past Surgical History:   Procedure Laterality Date     COLONOSCOPY N/A 2023    Procedure: Colonoscopy;  Surgeon: Lizzie Munoz MD;  Location: [facility]     IR CAROTID CEREBRAL ANGIOGRAM BILATERAL  3/20/2024     LAPAROSCOPIC DECORTICATION CYST RENAL  2006     LAPAROSCOPIC INSERTION CATHETER PERITONEAL DIALYSIS Right 2023    Procedure: INSERTION, CATHETER, DIALYSIS, PERITONEAL, LAPAROSCOPIC;  Surgeon: Juanita Hagen MD;  Location: [facility] OR     MYRINGOTOMY, INSERT TUBE(S), ADENOIDECTOMY, COMBINED       PE TUBES  age 13     SURGICAL HISTORY OF -   2005    kidney surgery for cyst removal       Social History:  Social History     Socioeconomic History     Marital status:      Spouse name: Not on file     Number of children: Not on file     Years of education: Not on file     Highest education level: Not on file   Occupational History     Not on file   Tobacco Use     Smoking status: Some Days     Current packs/day: 0.00     Average packs/day: 0.5 packs/day for 10.0 years (5.0 ttl pk-yrs)     Types: Cigarettes     Start date: 10/17/2012     Last attempt to quit: 10/17/2022     Years since quittin.3     Passive exposure: Current     Smokeless tobacco: Never   Vaping Use     Vaping status: Never Used   Substance and Sexual Activity     Alcohol use: Not Currently     Comment: Sober May 2023     Drug use: Yes     Types: Marijuana     Comment: Smokes once daily to induce appetite.     Sexual activity:  Yes     Partners: Male     Birth control/protection: None   Other Topics Concern     Parent/sibling w/ CABG, MI or angioplasty before 65F 55M? No   Social History Narrative    ** Merged History Encounter **         Dairy/d 2 servings/d.     Caffeine 3 servings/d    Exercise 5-7 x week walking    Sunscreen used - Yes    Seatbelts used - Yes    Working smoke/CO detectors in the home - NO    Guns stored in the home - No    Self Breast Exams - Yes    Self Testicular Exam - NA    Eye Exam up to date - Yes    Dental Exam up to date - No    Pap Smear up to date - Yes    Mammogram up to date - No    PSA up to date - NA    Dexa Scan up to date - NA    Flex Sig / Colonoscopy up to date - NA    Immunizations up to date - No    Abuse: Current or Past(Physical, Sexual or Emotional)- No    Do you feel safe in your environment - Yes    HONG KOCH LPN.    06/02/06         Social Drivers of Health     Financial Resource Strain: Not on file   Food Insecurity: Not on file   Transportation Needs: Not on file   Physical Activity: Not on file   Stress: Not on file   Social Connections: Not on file   Interpersonal Safety: Not on file   Housing Stability: Not on file       Family History:  Family History   Problem Relation Age of Onset     Lung Cancer Mother         lung     Hypertension Father      Breast Cancer Maternal Aunt      Cerebrovascular Disease Paternal Aunt 70     Breast Cancer Cousin      Breast Cancer Cousin      Diabetes No family hx of      C.A.D. No family hx of      Cancer - colorectal No family hx of      Prostate Cancer No family hx of      Anesthesia Reaction No family hx of      Venous thrombosis No family hx of        Home Medications:  Current Outpatient Medications   Medication Sig Dispense Refill     amLODIPine (NORVASC) 10 MG tablet Take 1 tablet (10 mg) by mouth daily 90 tablet 3     aspirin 81 MG EC tablet Take 1 tablet (81 mg) by mouth daily 60 tablet 0     aspirin 81 MG EC tablet Take 1 tablet (81 mg) by  mouth daily       cephALEXin (KEFLEX) 250 MG capsule Take 250 mg by mouth daily as needed       diazepam (VALIUM) 2 MG tablet Take 1 tab 40 minutes prior to scan and 1 tab 10 minutes prior to scan, if needed       diphenhydrAMINE-acetaminophen (TYLENOL PM)  MG tablet Take 2 tablets by mouth nightly as needed for sleep       famotidine (PEPCID) 10 MG tablet Take 10 mg by mouth daily as needed for heartburn       medical cannabis (Patient's own supply) 1 Dose See Admin Instructions (The purpose of this order is to document that the patient reports taking medical cannabis.  This is not a prescription, and is not used to certify that the patient has a qualifying medical condition.)    Daily smoking       polyethylene glycol (MIRALAX) 17 GM/Dose powder Take 17 g by mouth daily 510 g 1     potassium chloride jamey ER (KLOR-CON M20) 20 MEQ CR tablet Take 1 tablet (20 mEq) by mouth daily 90 tablet 3     sennosides (SENOKOT) 8.6 MG tablet Take 2 tablets by mouth at bedtime       simethicone (MYLICON) 80 MG chewable tablet Take 80 mg by mouth daily as needed for cramping or flatulence       ticagrelor (BRILINTA) 60 MG tablet Take 1 tablet (60 mg) by mouth 2 times daily 120 tablet 0     ticagrelor (BRILINTA) 60 MG tablet Take 1 tablet (60 mg) by mouth 2 times daily       varenicline (CHANTIX) 0.5 MG tablet Take 1 tablet (0.5 mg) by mouth daily 90 tablet 0     Vitamin D, Cholecalciferol, 10 MCG (400 UNIT) TABS Take 1 tablet by mouth daily       No current facility-administered medications for this visit.       Allergies:  Allergies   Allergen Reactions     Bactrim [Sulfamethoxazole W/Trimethoprim] Itching     Oxycodone-Acetaminophen Nausea and Vomiting     Seasonal Allergies      Pcn [Penicillin G]        Physical Examination:  Video visit  Awake, alert, attentive, fully oriented, language is fluent and coherent  EOMI, face symmetric, no dysarthria,      Laboratory findings:  Reviewed    Imaging findings:  MRA brain  reviewed-Status post stent assisted web embolization of right MCA  bifurcation aneurysm. There is laterally oriented 2 mm outpouching  which appears to be continuing with the anterior frontal polar into  division of MCA, which may represent a prominent infundibulum versus  residual tiny aneurysm. Otherwise, compared with prior MR angiography,  the aneurysm is mostly excluded from the circulation. The stent  appears to be patent. No new aneurysm elsewhere in the brain. Right  fetal PCA. Left posterior communicating artery is not well visualized.  Patent anterior communicating artery.       Impression:  Right MCA trifurcation 5 mm saccular aneurysm- we discussed the natural history of brain aneurysms at length.  While her risk of the aneurysm incidence is higher than the general population, her risk of rupture is comparable to that of other patients with incidentally noted brain aneurysms.  5-year rupture risk of the lesion of this size, in this location is less than 1%.  On most recent imaging her aneurysm had grown in size from 5mm to 5.5mm, given the increase in size and her young age intervention was recommended and she underwent web embolization.        Plan:  -MRA head WO 1 year  -Return to clinic after repeat imaging      Patient seen and discussed with the attending, Dr. Hayes.  Brenda Haskins MD  Endovascular Surgical Neuroradiology Fellow  [facility]  [phone]    Endovascular Surgical Neuroradiology staff is Dr. Hayes        Attestation signed by Jesus Hayes MD at 3/6/2025  8:27 AM:  I agree with the above note by Dr. Haskins        on  3/04/25      Again, thank you for allowing me to participate in the care of your patient.      Sincerely,    Jesus Hayes MD

## 2025-03-04 NOTE — PROGRESS NOTES
"Virtual Visit Details    Type of service:  Video Visit   Video Start Time: {video visit start/end time for provider to select:555561}  Video End Time:{video visit start/end time for provider to select:310031}    Originating Location (pt. Location): {video visit patient location:980162::\"Home\"}  {PROVIDER LOCATION On-site should be selected for visits conducted from your clinic location or adjoining [facility] hospital, academic office, or other nearby [facility] building. Off-site should be selected for all other provider locations, including home:413219}  Distant Location (provider location):  {virtual location provider:230899}  Platform used for Video Visit: {Virtual Visit Platforms:926198::\"[place]\"}  "

## 2025-03-04 NOTE — PROGRESS NOTES
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  She underwent MRA screening for aneurysms due to her PKD in 2005, and this lesion was not present at that time.  Again, imaging was obtained for aneurysm screening, in the context of preparation for kidney transplant.  Her blood pressure is well controlled.  She has quit smoking on a number of occasions and most recently restarted 2 weeks ago, but she is starting Chantix and intends to quit shortly.  She has no personal or family history of other brain aneurysm, or subarachnoid hemorrhage.    She is from [place], [place] and is [facility], she is on dialysis for ESRD - on PD everyday.   She hasn't had the kidney transplant yet she isn't sure when it was going to get done, she has to undergo dental in order to proceed, she has some broken teeth. She is inactive on the transplant list.   She quit smoking in September. SBPs 120-130/84    Interval history: She underwent web placement in March 2024 and presents for follow-up clinic visit. She has been doing good she feels the same, no new symptoms.     Interval history:     She is doing well, she has been doing well, her kidney function has improved. She denies any weakness, numbness, tingling episodes.     Past Medical History:  Past Medical History:   Diagnosis Date    Anxiety     Brain aneurysm     Chronic kidney disease     Contraception 03/19/2009    Depression     Former tobacco use     Hypertension      Liver disease     PKD (polycystic kidney disease)     PONV (postoperative nausea and vomiting)     Thyroid disease     Varicosities        Past Surgical History:  Past Surgical History:   Procedure Laterality Date    COLONOSCOPY N/A 2023    Procedure: Colonoscopy;  Surgeon: Lizzie Munoz MD;  Location: [facility]    IR CAROTID CEREBRAL ANGIOGRAM BILATERAL  3/20/2024    LAPAROSCOPIC DECORTICATION CYST RENAL  2006    LAPAROSCOPIC INSERTION CATHETER PERITONEAL DIALYSIS Right 2023    Procedure: INSERTION, CATHETER, DIALYSIS, PERITONEAL, LAPAROSCOPIC;  Surgeon: Juanita Hagen MD;  Location: [facility] OR    MYRINGOTOMY, INSERT TUBE(S), ADENOIDECTOMY, COMBINED      PE TUBES  age 13    SURGICAL HISTORY OF -   2005    kidney surgery for cyst removal       Social History:  Social History     Socioeconomic History    Marital status:      Spouse name: Not on file    Number of children: Not on file    Years of education: Not on file    Highest education level: Not on file   Occupational History    Not on file   Tobacco Use    Smoking status: Some Days     Current packs/day: 0.00     Average packs/day: 0.5 packs/day for 10.0 years (5.0 ttl pk-yrs)     Types: Cigarettes     Start date: 10/17/2012     Last attempt to quit: 10/17/2022     Years since quittin.3     Passive exposure: Current    Smokeless tobacco: Never   Vaping Use    Vaping status: Never Used   Substance and Sexual Activity    Alcohol use: Not Currently     Comment: Sober May 2023    Drug use: Yes     Types: Marijuana     Comment: Smokes once daily to induce appetite.    Sexual activity: Yes     Partners: Male     Birth control/protection: None   Other Topics Concern    Parent/sibling w/ CABG, MI or angioplasty before 65F 55M? No   Social History Narrative    ** Merged History Encounter **         Dairy/d 2 servings/d.     Caffeine 3 servings/d    Exercise 5-7 x week walking    Sunscreen used - Yes    Seatbelts used - Yes    Working  smoke/CO detectors in the home - NO    Guns stored in the home - No    Self Breast Exams - Yes    Self Testicular Exam - NA    Eye Exam up to date - Yes    Dental Exam up to date - No    Pap Smear up to date - Yes    Mammogram up to date - No    PSA up to date - NA    Dexa Scan up to date - NA    Flex Sig / Colonoscopy up to date - NA    Immunizations up to date - No    Abuse: Current or Past(Physical, Sexual or Emotional)- No    Do you feel safe in your environment - Yes    HONG KOCH LPN.    06/02/06         Social Drivers of Health     Financial Resource Strain: Not on file   Food Insecurity: Not on file   Transportation Needs: Not on file   Physical Activity: Not on file   Stress: Not on file   Social Connections: Not on file   Interpersonal Safety: Not on file   Housing Stability: Not on file       Family History:  Family History   Problem Relation Age of Onset    Lung Cancer Mother         lung    Hypertension Father     Breast Cancer Maternal Aunt     Cerebrovascular Disease Paternal Aunt 70    Breast Cancer Cousin     Breast Cancer Cousin     Diabetes No family hx of     C.A.D. No family hx of     Cancer - colorectal No family hx of     Prostate Cancer No family hx of     Anesthesia Reaction No family hx of     Venous thrombosis No family hx of        Home Medications:  Current Outpatient Medications   Medication Sig Dispense Refill    amLODIPine (NORVASC) 10 MG tablet Take 1 tablet (10 mg) by mouth daily 90 tablet 3    aspirin 81 MG EC tablet Take 1 tablet (81 mg) by mouth daily 60 tablet 0    aspirin 81 MG EC tablet Take 1 tablet (81 mg) by mouth daily      cephALEXin (KEFLEX) 250 MG capsule Take 250 mg by mouth daily as needed      diazepam (VALIUM) 2 MG tablet Take 1 tab 40 minutes prior to scan and 1 tab 10 minutes prior to scan, if needed      diphenhydrAMINE-acetaminophen (TYLENOL PM)  MG tablet Take 2 tablets by mouth nightly as needed for sleep      famotidine (PEPCID) 10 MG tablet Take 10 mg  by mouth daily as needed for heartburn      medical cannabis (Patient's own supply) 1 Dose See Admin Instructions (The purpose of this order is to document that the patient reports taking medical cannabis.  This is not a prescription, and is not used to certify that the patient has a qualifying medical condition.)    Daily smoking      polyethylene glycol (MIRALAX) 17 GM/Dose powder Take 17 g by mouth daily 510 g 1    potassium chloride jamey ER (KLOR-CON M20) 20 MEQ CR tablet Take 1 tablet (20 mEq) by mouth daily 90 tablet 3    sennosides (SENOKOT) 8.6 MG tablet Take 2 tablets by mouth at bedtime      simethicone (MYLICON) 80 MG chewable tablet Take 80 mg by mouth daily as needed for cramping or flatulence      ticagrelor (BRILINTA) 60 MG tablet Take 1 tablet (60 mg) by mouth 2 times daily 120 tablet 0    ticagrelor (BRILINTA) 60 MG tablet Take 1 tablet (60 mg) by mouth 2 times daily      varenicline (CHANTIX) 0.5 MG tablet Take 1 tablet (0.5 mg) by mouth daily 90 tablet 0    Vitamin D, Cholecalciferol, 10 MCG (400 UNIT) TABS Take 1 tablet by mouth daily       No current facility-administered medications for this visit.       Allergies:  Allergies   Allergen Reactions    Bactrim [Sulfamethoxazole W/Trimethoprim] Itching    Oxycodone-Acetaminophen Nausea and Vomiting    Seasonal Allergies     Pcn [Penicillin G]        Physical Examination:  Video visit  Awake, alert, attentive, fully oriented, language is fluent and coherent  EOMI, face symmetric, no dysarthria,      Laboratory findings:  Reviewed    Imaging findings:  MRA brain reviewed-Status post stent assisted web embolization of right MCA  bifurcation aneurysm. There is laterally oriented 2 mm outpouching  which appears to be continuing with the anterior frontal polar into  division of MCA, which may represent a prominent infundibulum versus  residual tiny aneurysm. Otherwise, compared with prior MR angiography,  the aneurysm is mostly excluded from the  circulation. The stent  appears to be patent. No new aneurysm elsewhere in the brain. Right  fetal PCA. Left posterior communicating artery is not well visualized.  Patent anterior communicating artery.       Impression:  Right MCA trifurcation 5 mm saccular aneurysm- we discussed the natural history of brain aneurysms at length.  While her risk of the aneurysm incidence is higher than the general population, her risk of rupture is comparable to that of other patients with incidentally noted brain aneurysms.  5-year rupture risk of the lesion of this size, in this location is less than 1%.  On most recent imaging her aneurysm had grown in size from 5mm to 5.5mm, given the increase in size and her young age intervention was recommended and she underwent web embolization.        Plan:  -MRA head WO 1 year  -Return to clinic after repeat imaging      Patient seen and discussed with the attending, Dr. Hayes.  Brenda Haskins MD  Endovascular Surgical Neuroradiology Fellow  [facility]  [phone]    Endovascular Surgical Neuroradiology staff is Dr. Hayes

## 2025-03-22 ENCOUNTER — HEALTH MAINTENANCE LETTER (OUTPATIENT)
Age: 55
End: 2025-03-22

## 2025-03-26 DIAGNOSIS — I12.9 RENAL HYPERTENSION: Primary | ICD-10-CM

## 2025-03-26 RX ORDER — CARVEDILOL 6.25 MG/1
6.25 TABLET ORAL 2 TIMES DAILY WITH MEALS
Qty: 180 TABLET | Refills: 3 | Status: SHIPPED | OUTPATIENT
Start: 2025-03-26

## (undated) DEVICE — TUBING IRRIG CYSTO/BLADDER SET 81" LF 2C4040

## (undated) DEVICE — CATH PD MINICAP

## (undated) DEVICE — ENDO TROCAR BLADELESS 05MM MINISTEP MS101005

## (undated) DEVICE — DRSG ABDOMINAL 07 1/2X8" 7197D

## (undated) DEVICE — SUCTION MANIFOLD NEPTUNE 2 SYS 4 PORT 0702-020-000

## (undated) DEVICE — SU VICRYL 3-0 SH 27" UND J416H

## (undated) DEVICE — NDL INSUFFLATION 14GA STEP S100000

## (undated) DEVICE — TROCAR FALLOR TUNNELING PERITINEAL DIALYSIS CATH 8888415679

## (undated) DEVICE — DEVICE SUTURE GRASPER TROCAR CLOSURE 14GA PMITCSG

## (undated) DEVICE — ESU HOLSTER PLASTIC DISP E2400

## (undated) DEVICE — NDL INSUFFLATION 13GA 120MM C2201

## (undated) DEVICE — CATH PD MINICAP TRANSFER SET

## (undated) DEVICE — DRSG TEGADERM 8X12" 1629

## (undated) DEVICE — CATH TRAY FOLEY SURESTEP 16FR W/URNE MTR STLK LATEX A303316A

## (undated) DEVICE — COVER CAMERA IN-LIGHT DISP LT-C02

## (undated) DEVICE — SOL NACL 0.9% INJ 1000ML BAG 2B1324X

## (undated) DEVICE — DRSG TEGADERM 4X4 3/4" 1626W

## (undated) DEVICE — DRSG BIOPATCH GERMICIDAL SPLIT SPONGE 4MM MED 4150

## (undated) DEVICE — LINEN TOWEL PACK X5 5464

## (undated) DEVICE — Device

## (undated) DEVICE — PREP CHLORAPREP 26ML TINTED HI-LITE ORANGE 930815

## (undated) DEVICE — TUBING SMOKE EVAC PNEUMOCLEAR HEATED 0620050350

## (undated) DEVICE — ANTIFOG SOLUTION W/FOAM PAD 31142527

## (undated) DEVICE — ESU GROUND PAD ADULT W/CORD E7507

## (undated) DEVICE — LIGHT HANDLE X1 31140133

## (undated) DEVICE — SU VICRYL 0 TIE 12X18" J906G

## (undated) DEVICE — ESU PENCIL SMOKE EVAC W/ROCKER SWITCH 0703-047-000

## (undated) DEVICE — ENDO TROCAR BLADELESS 07-8MM MINISTEP MS101008

## (undated) DEVICE — DRAPE SHEET REV FOLD 3/4 9349

## (undated) DEVICE — SU MONOCRYL 4-0 PS-2 27" UND Y426H

## (undated) DEVICE — DRAPE IOBAN INCISE 23X17" 6650EZ

## (undated) DEVICE — DRSG PRIMAPORE 03 1/8X6" 66000318

## (undated) DEVICE — SU VICRYL 0 TIE 54" J608H

## (undated) RX ORDER — ACETAMINOPHEN 325 MG/1
TABLET ORAL
Status: DISPENSED
Start: 2024-03-20

## (undated) RX ORDER — ONDANSETRON 2 MG/ML
INJECTION INTRAMUSCULAR; INTRAVENOUS
Status: DISPENSED
Start: 2023-01-11

## (undated) RX ORDER — FENTANYL CITRATE 50 UG/ML
INJECTION, SOLUTION INTRAMUSCULAR; INTRAVENOUS
Status: DISPENSED
Start: 2023-01-11

## (undated) RX ORDER — FENTANYL CITRATE 50 UG/ML
INJECTION, SOLUTION INTRAMUSCULAR; INTRAVENOUS
Status: DISPENSED
Start: 2024-03-20

## (undated) RX ORDER — CEFAZOLIN SODIUM 1 G/3ML
INJECTION, POWDER, FOR SOLUTION INTRAMUSCULAR; INTRAVENOUS
Status: DISPENSED
Start: 2023-01-11

## (undated) RX ORDER — PROPOFOL 10 MG/ML
INJECTION, EMULSION INTRAVENOUS
Status: DISPENSED
Start: 2023-01-11

## (undated) RX ORDER — LIDOCAINE HYDROCHLORIDE 10 MG/ML
INJECTION, SOLUTION EPIDURAL; INFILTRATION; INTRACAUDAL; PERINEURAL
Status: DISPENSED
Start: 2024-03-20

## (undated) RX ORDER — FENTANYL CITRATE 50 UG/ML
INJECTION, SOLUTION INTRAMUSCULAR; INTRAVENOUS
Status: DISPENSED
Start: 2023-04-07

## (undated) RX ORDER — HYDROMORPHONE HYDROCHLORIDE 1 MG/ML
INJECTION, SOLUTION INTRAMUSCULAR; INTRAVENOUS; SUBCUTANEOUS
Status: DISPENSED
Start: 2023-01-11

## (undated) RX ORDER — HEPARIN SODIUM 1000 [USP'U]/ML
INJECTION, SOLUTION INTRAVENOUS; SUBCUTANEOUS
Status: DISPENSED
Start: 2024-03-20

## (undated) RX ORDER — LIDOCAINE HYDROCHLORIDE 5 MG/ML
INJECTION, SOLUTION INFILTRATION; INTRAVENOUS
Status: DISPENSED
Start: 2023-01-11

## (undated) RX ORDER — CEFAZOLIN SODIUM/WATER 2 G/20 ML
SYRINGE (ML) INTRAVENOUS
Status: DISPENSED
Start: 2023-01-11

## (undated) RX ORDER — ALBUTEROL SULFATE 0.83 MG/ML
SOLUTION RESPIRATORY (INHALATION)
Status: DISPENSED
Start: 2022-12-27

## (undated) RX ORDER — ATROPINE SULFATE 0.4 MG/ML
AMPUL (ML) INJECTION
Status: DISPENSED
Start: 2024-03-20

## (undated) RX ORDER — BUPIVACAINE HYDROCHLORIDE 5 MG/ML
INJECTION, SOLUTION EPIDURAL; INTRACAUDAL
Status: DISPENSED
Start: 2023-01-11

## (undated) RX ORDER — SIMETHICONE 40MG/0.6ML
SUSPENSION, DROPS(FINAL DOSAGE FORM)(ML) ORAL
Status: DISPENSED
Start: 2023-04-07

## (undated) RX ORDER — PROPOFOL 10 MG/ML
INJECTION, EMULSION INTRAVENOUS
Status: DISPENSED
Start: 2024-03-20

## (undated) RX ORDER — ONDANSETRON 2 MG/ML
INJECTION INTRAMUSCULAR; INTRAVENOUS
Status: DISPENSED
Start: 2023-04-07

## (undated) RX ORDER — ESMOLOL HYDROCHLORIDE 10 MG/ML
INJECTION INTRAVENOUS
Status: DISPENSED
Start: 2023-01-11

## (undated) RX ORDER — ONDANSETRON 2 MG/ML
INJECTION INTRAMUSCULAR; INTRAVENOUS
Status: DISPENSED
Start: 2024-03-20

## (undated) RX ORDER — HYDRALAZINE HYDROCHLORIDE 20 MG/ML
INJECTION INTRAMUSCULAR; INTRAVENOUS
Status: DISPENSED
Start: 2023-01-11

## (undated) RX ORDER — HEPARIN SODIUM 1000 [USP'U]/ML
INJECTION, SOLUTION INTRAVENOUS; SUBCUTANEOUS
Status: DISPENSED
Start: 2023-01-11

## (undated) RX ORDER — EPHEDRINE SULFATE 50 MG/ML
INJECTION, SOLUTION INTRAMUSCULAR; INTRAVENOUS; SUBCUTANEOUS
Status: DISPENSED
Start: 2024-03-20

## (undated) RX ORDER — LIDOCAINE HYDROCHLORIDE 20 MG/ML
JELLY TOPICAL
Status: DISPENSED
Start: 2024-03-20

## (undated) RX ORDER — HEPARIN SODIUM 10000 [USP'U]/100ML
INJECTION, SOLUTION INTRAVENOUS
Status: DISPENSED
Start: 2024-03-20

## (undated) RX ORDER — APREPITANT 40 MG/1
CAPSULE ORAL
Status: DISPENSED
Start: 2024-03-20